# Patient Record
Sex: FEMALE | Race: ASIAN | NOT HISPANIC OR LATINO | URBAN - METROPOLITAN AREA
[De-identification: names, ages, dates, MRNs, and addresses within clinical notes are randomized per-mention and may not be internally consistent; named-entity substitution may affect disease eponyms.]

---

## 2017-03-13 ENCOUNTER — EMERGENCY (EMERGENCY)
Facility: HOSPITAL | Age: 57
LOS: 1 days | Discharge: ANOTHER HOSPITAL | End: 2017-03-13
Attending: EMERGENCY MEDICINE
Payer: COMMERCIAL

## 2017-03-13 ENCOUNTER — INPATIENT (INPATIENT)
Facility: HOSPITAL | Age: 57
LOS: 8 days | Discharge: HOME | DRG: 25 | End: 2017-03-22
Attending: STUDENT IN AN ORGANIZED HEALTH CARE EDUCATION/TRAINING PROGRAM | Admitting: INTERNAL MEDICINE
Payer: COMMERCIAL

## 2017-03-13 VITALS
WEIGHT: 132.94 LBS | HEIGHT: 61 IN | RESPIRATION RATE: 16 BRPM | SYSTOLIC BLOOD PRESSURE: 109 MMHG | DIASTOLIC BLOOD PRESSURE: 70 MMHG | HEART RATE: 83 BPM | TEMPERATURE: 98 F | OXYGEN SATURATION: 100 %

## 2017-03-13 VITALS
HEART RATE: 80 BPM | RESPIRATION RATE: 18 BRPM | TEMPERATURE: 99 F | OXYGEN SATURATION: 100 % | SYSTOLIC BLOOD PRESSURE: 120 MMHG | DIASTOLIC BLOOD PRESSURE: 80 MMHG

## 2017-03-13 DIAGNOSIS — C79.51 SECONDARY MALIGNANT NEOPLASM OF BONE: ICD-10-CM

## 2017-03-13 DIAGNOSIS — Z90.13 ACQUIRED ABSENCE OF BILATERAL BREASTS AND NIPPLES: Chronic | ICD-10-CM

## 2017-03-13 DIAGNOSIS — C78.00 SECONDARY MALIGNANT NEOPLASM OF UNSPECIFIED LUNG: ICD-10-CM

## 2017-03-13 DIAGNOSIS — C79.31 SECONDARY MALIGNANT NEOPLASM OF BRAIN: ICD-10-CM

## 2017-03-13 DIAGNOSIS — C50.919 MALIGNANT NEOPLASM OF UNSPECIFIED SITE OF UNSPECIFIED FEMALE BREAST: ICD-10-CM

## 2017-03-13 LAB
ALBUMIN SERPL ELPH-MCNC: 4.6 G/DL — SIGNIFICANT CHANGE UP (ref 3.5–5)
ALP SERPL-CCNC: 66 U/L — SIGNIFICANT CHANGE UP (ref 40–120)
ALT FLD-CCNC: 13 U/L DA — SIGNIFICANT CHANGE UP (ref 10–60)
ANION GAP SERPL CALC-SCNC: 8 MMOL/L — SIGNIFICANT CHANGE UP (ref 5–17)
APTT BLD: 29.2 SEC — SIGNIFICANT CHANGE UP (ref 27.5–37.4)
AST SERPL-CCNC: 14 U/L — SIGNIFICANT CHANGE UP (ref 10–40)
BASOPHILS # BLD AUTO: 0.1 K/UL — SIGNIFICANT CHANGE UP (ref 0–0.2)
BASOPHILS NFR BLD AUTO: 0.9 % — SIGNIFICANT CHANGE UP (ref 0–2)
BILIRUB SERPL-MCNC: 0.6 MG/DL — SIGNIFICANT CHANGE UP (ref 0.2–1.2)
BUN SERPL-MCNC: 8 MG/DL — SIGNIFICANT CHANGE UP (ref 7–18)
CALCIUM SERPL-MCNC: 9.6 MG/DL — SIGNIFICANT CHANGE UP (ref 8.4–10.5)
CHLORIDE SERPL-SCNC: 105 MMOL/L — SIGNIFICANT CHANGE UP (ref 96–108)
CO2 SERPL-SCNC: 28 MMOL/L — SIGNIFICANT CHANGE UP (ref 22–31)
CREAT SERPL-MCNC: 0.68 MG/DL — SIGNIFICANT CHANGE UP (ref 0.5–1.3)
EOSINOPHIL # BLD AUTO: 0.1 K/UL — SIGNIFICANT CHANGE UP (ref 0–0.5)
EOSINOPHIL NFR BLD AUTO: 0.9 % — SIGNIFICANT CHANGE UP (ref 0–6)
GLUCOSE SERPL-MCNC: 91 MG/DL — SIGNIFICANT CHANGE UP (ref 70–99)
HCT VFR BLD CALC: 43.9 % — SIGNIFICANT CHANGE UP (ref 34.5–45)
HGB BLD-MCNC: 14.3 G/DL — SIGNIFICANT CHANGE UP (ref 11.5–15.5)
INR BLD: 0.97 RATIO — SIGNIFICANT CHANGE UP (ref 0.88–1.16)
LYMPHOCYTES # BLD AUTO: 1.8 K/UL — SIGNIFICANT CHANGE UP (ref 1–3.3)
LYMPHOCYTES # BLD AUTO: 29.4 % — SIGNIFICANT CHANGE UP (ref 13–44)
MCHC RBC-ENTMCNC: 32.7 GM/DL — SIGNIFICANT CHANGE UP (ref 32–36)
MCHC RBC-ENTMCNC: 33.6 PG — SIGNIFICANT CHANGE UP (ref 27–34)
MCV RBC AUTO: 102.7 FL — HIGH (ref 80–100)
MONOCYTES # BLD AUTO: 0.5 K/UL — SIGNIFICANT CHANGE UP (ref 0–0.9)
MONOCYTES NFR BLD AUTO: 8.1 % — SIGNIFICANT CHANGE UP (ref 2–14)
NEUTROPHILS # BLD AUTO: 3.7 K/UL — SIGNIFICANT CHANGE UP (ref 1.8–7.4)
NEUTROPHILS NFR BLD AUTO: 60.7 % — SIGNIFICANT CHANGE UP (ref 43–77)
PLATELET # BLD AUTO: 223 K/UL — SIGNIFICANT CHANGE UP (ref 150–400)
POTASSIUM SERPL-MCNC: 4.2 MMOL/L — SIGNIFICANT CHANGE UP (ref 3.5–5.3)
POTASSIUM SERPL-SCNC: 4.2 MMOL/L — SIGNIFICANT CHANGE UP (ref 3.5–5.3)
PROT SERPL-MCNC: 8.5 G/DL — HIGH (ref 6–8.3)
PROTHROM AB SERPL-ACNC: 10.9 SEC — SIGNIFICANT CHANGE UP (ref 10–13.1)
RBC # BLD: 4.27 M/UL — SIGNIFICANT CHANGE UP (ref 3.8–5.2)
RBC # FLD: 15.6 % — HIGH (ref 10.3–14.5)
SODIUM SERPL-SCNC: 141 MMOL/L — SIGNIFICANT CHANGE UP (ref 135–145)
WBC # BLD: 6 K/UL — SIGNIFICANT CHANGE UP (ref 3.8–10.5)
WBC # FLD AUTO: 6 K/UL — SIGNIFICANT CHANGE UP (ref 3.8–10.5)

## 2017-03-13 PROCEDURE — 85610 PROTHROMBIN TIME: CPT

## 2017-03-13 PROCEDURE — 80053 COMPREHEN METABOLIC PANEL: CPT

## 2017-03-13 PROCEDURE — 85730 THROMBOPLASTIN TIME PARTIAL: CPT

## 2017-03-13 PROCEDURE — 85027 COMPLETE CBC AUTOMATED: CPT

## 2017-03-13 PROCEDURE — 86901 BLOOD TYPING SEROLOGIC RH(D): CPT

## 2017-03-13 PROCEDURE — 99285 EMERGENCY DEPT VISIT HI MDM: CPT

## 2017-03-13 PROCEDURE — 99285 EMERGENCY DEPT VISIT HI MDM: CPT | Mod: 25

## 2017-03-13 PROCEDURE — 86850 RBC ANTIBODY SCREEN: CPT

## 2017-03-13 PROCEDURE — 70450 CT HEAD/BRAIN W/O DYE: CPT | Mod: 26

## 2017-03-13 PROCEDURE — 99283 EMERGENCY DEPT VISIT LOW MDM: CPT

## 2017-03-13 PROCEDURE — 96375 TX/PRO/DX INJ NEW DRUG ADDON: CPT

## 2017-03-13 PROCEDURE — 96374 THER/PROPH/DIAG INJ IV PUSH: CPT

## 2017-03-13 PROCEDURE — 86900 BLOOD TYPING SEROLOGIC ABO: CPT

## 2017-03-13 RX ORDER — DEXAMETHASONE 0.5 MG/5ML
10 ELIXIR ORAL ONCE
Qty: 0 | Refills: 0 | Status: COMPLETED | OUTPATIENT
Start: 2017-03-13 | End: 2017-03-13

## 2017-03-13 RX ORDER — LEVETIRACETAM 250 MG/1
1000 TABLET, FILM COATED ORAL ONCE
Qty: 0 | Refills: 0 | Status: COMPLETED | OUTPATIENT
Start: 2017-03-13 | End: 2017-03-13

## 2017-03-13 RX ADMIN — Medication 10 MILLIGRAM(S): at 19:55

## 2017-03-13 RX ADMIN — LEVETIRACETAM 400 MILLIGRAM(S): 250 TABLET, FILM COATED ORAL at 20:13

## 2017-03-13 NOTE — ED PROVIDER NOTE - OBJECTIVE STATEMENT
PMD Carson ([facility])  56F hx of metastatic breast cancer on chemo last chemo ziloda yesterday (7days on 7 days off) transfer from [place] for masses in brain. Pt has been having headaches for the past few weeks. had MRI today outpatient went to [place] sent over for neurosurgery eval. 3 enhancing intraaxial lesions with marked surrouinding edema resulting in mild subfalcine herniation. Pt was given decadron and keppra. No headache at this point.

## 2017-03-13 NOTE — ED ADULT NURSE NOTE - OBJECTIVE STATEMENT
57 y/o F, transfer from [facility] for neuro eval and new dx of metastatic brain CA. Pt was dx with breast CA in 2015, double mastectomy in 2016, on oral chemo Ziloda 7 days on/7 days off, yesterday was 7th day on. Pt had been having intermittent headaches x 1 week, got outpatient MRI and was told to come back to hospital for neuro w/u. Pt got 1000 mg Keppra and 10 mg decadron at [facility] at 1930. Pt presents AAOx4, NAD, ambulates with cane, neurologically intact, moving all extremities. No confusion or changes in mental status. Pt currently denies headaches, visual changes, numbness/tingling, dizziness, chest pain, SOB, abdominal pain, n/v/d, urinary symptoms, fevers, chills, weakness at this time. Family at bedside, safety maintained.

## 2017-03-13 NOTE — ED PROVIDER NOTE - MEDICAL DECISION MAKING DETAILS
56F hx metastatic breast ca presents with headache and mets to brain. Will obtain neurosurgery consult and admission

## 2017-03-13 NOTE — ED PROVIDER NOTE - PHYSICAL EXAMINATION
Constitutional: mild distress  Eyes: PERRLA EOMI  Head: Normocephalic atraumatic  Cardiac: regular rate   Resp: Lungs CTAB  GI: Abd s/nt/nd  Neuro: CN2-12 intact  Skin: No rashes

## 2017-03-13 NOTE — ED ADULT NURSE NOTE - CHPI ED SYMPTOMS NEG
no change in level of consciousness/no nausea/no dizziness/no vomiting/no loss of consciousness/no confusion/no numbness/no weakness/no blurred vision/no fever

## 2017-03-13 NOTE — ED ADULT NURSE NOTE - CHPI ED SYMPTOMS NEG
no dizziness/no nausea/no fever/no chills/no decreased eating/drinking/no numbness/no vomiting/no pain/no tingling

## 2017-03-13 NOTE — ED PROVIDER NOTE - PROGRESS NOTE DETAILS
In discussion with Dr. Morelos, of neurosurgery, patient should be transferred to [facility] ED for neurosurgical evaluation. Recommends decadron and keppra. Patient aware. Dr. Pena, ED attending, aware of transfer. Message left for patient's PMD,. Dr. Byrd.

## 2017-03-13 NOTE — ED PROVIDER NOTE - ATTENDING CONTRIBUTION TO CARE
56F hx of metastatic breast cancer on chemo last chemo ziloda yesterday (7days on 7 days off) transfer from [place] for masses in brain. Pt has been having headaches for the past few weeks. had MRI today outpatient went to [place] sent over for neurosurgery eval. 3 enhancing intraaxial lesions with marked surrounding edema resulting in mild subfalcine herniation. Pt was given decadron and keppra. No headache at this point. Moderate symptoms at first.  mild distress secondary to pain, ncat, perrl, CN 2-12 intact, normal coordination, non-tachycardic, cooperative, with capacity and insight, no rash, non-distended, no edema  will get iv, labs, neurosurgery consult and reassess. Will admit.

## 2017-03-13 NOTE — ED PROVIDER NOTE - NS ED ROS FT
Constitutional: No fever or chills  Eyes: No visual changes  HEENT: No throat pain  CV: No chest pain  Resp: No SOB + cough  GI: No abd pain, nausea + vomiting  : No dysuria  MSK: No musculoskeletal pain  Skin: No rash  Neuro: + headache

## 2017-03-13 NOTE — ED ADULT NURSE NOTE - ASSOCIATED SYMPTOMS
pt is A&Ox3, ambulatory, able to make needs known sent to ED by her PMD for possible CA spread after MRI results of head. PT denies any pain of headache

## 2017-03-14 ENCOUNTER — RESULT REVIEW (OUTPATIENT)
Age: 57
End: 2017-03-14

## 2017-03-14 DIAGNOSIS — Z71.89 OTHER SPECIFIED COUNSELING: ICD-10-CM

## 2017-03-14 DIAGNOSIS — C79.31 SECONDARY MALIGNANT NEOPLASM OF BRAIN: ICD-10-CM

## 2017-03-14 DIAGNOSIS — C50.919 MALIGNANT NEOPLASM OF UNSPECIFIED SITE OF UNSPECIFIED FEMALE BREAST: ICD-10-CM

## 2017-03-14 DIAGNOSIS — I82.90 ACUTE EMBOLISM AND THROMBOSIS OF UNSPECIFIED VEIN: ICD-10-CM

## 2017-03-14 DIAGNOSIS — Z90.13 ACQUIRED ABSENCE OF BILATERAL BREASTS AND NIPPLES: Chronic | ICD-10-CM

## 2017-03-14 LAB
ALBUMIN SERPL ELPH-MCNC: 4.7 G/DL — SIGNIFICANT CHANGE UP (ref 3.3–5)
ALP SERPL-CCNC: 61 U/L — SIGNIFICANT CHANGE UP (ref 40–120)
ALT FLD-CCNC: 5 U/L RC — LOW (ref 10–45)
ANION GAP SERPL CALC-SCNC: 16 MMOL/L — SIGNIFICANT CHANGE UP (ref 5–17)
ANION GAP SERPL CALC-SCNC: 18 MMOL/L — HIGH (ref 5–17)
APTT BLD: 28.2 SEC — SIGNIFICANT CHANGE UP (ref 27.5–37.4)
APTT BLD: 30.3 SEC — SIGNIFICANT CHANGE UP (ref 27.5–37.4)
AST SERPL-CCNC: 11 U/L — SIGNIFICANT CHANGE UP (ref 10–40)
BASOPHILS # BLD AUTO: 0 K/UL — SIGNIFICANT CHANGE UP (ref 0–0.2)
BASOPHILS NFR BLD AUTO: 0.2 % — SIGNIFICANT CHANGE UP (ref 0–2)
BILIRUB DIRECT SERPL-MCNC: 0.1 MG/DL — SIGNIFICANT CHANGE UP (ref 0–0.2)
BILIRUB INDIRECT FLD-MCNC: 0.3 MG/DL — SIGNIFICANT CHANGE UP (ref 0.2–1)
BILIRUB SERPL-MCNC: 0.4 MG/DL — SIGNIFICANT CHANGE UP (ref 0.2–1.2)
BLD GP AB SCN SERPL QL: NEGATIVE — SIGNIFICANT CHANGE UP
BUN SERPL-MCNC: 12 MG/DL — SIGNIFICANT CHANGE UP (ref 7–23)
BUN SERPL-MCNC: 13 MG/DL — SIGNIFICANT CHANGE UP (ref 7–23)
CALCIUM SERPL-MCNC: 10.2 MG/DL — SIGNIFICANT CHANGE UP (ref 8.4–10.5)
CALCIUM SERPL-MCNC: 9.9 MG/DL — SIGNIFICANT CHANGE UP (ref 8.4–10.5)
CHLORIDE SERPL-SCNC: 100 MMOL/L — SIGNIFICANT CHANGE UP (ref 96–108)
CHLORIDE SERPL-SCNC: 102 MMOL/L — SIGNIFICANT CHANGE UP (ref 96–108)
CO2 SERPL-SCNC: 23 MMOL/L — SIGNIFICANT CHANGE UP (ref 22–31)
CO2 SERPL-SCNC: 23 MMOL/L — SIGNIFICANT CHANGE UP (ref 22–31)
CREAT SERPL-MCNC: 0.75 MG/DL — SIGNIFICANT CHANGE UP (ref 0.5–1.3)
CREAT SERPL-MCNC: 0.76 MG/DL — SIGNIFICANT CHANGE UP (ref 0.5–1.3)
EOSINOPHIL # BLD AUTO: 0 K/UL — SIGNIFICANT CHANGE UP (ref 0–0.5)
EOSINOPHIL NFR BLD AUTO: 0.2 % — SIGNIFICANT CHANGE UP (ref 0–6)
GLUCOSE SERPL-MCNC: 222 MG/DL — HIGH (ref 70–99)
GLUCOSE SERPL-MCNC: 228 MG/DL — HIGH (ref 70–99)
HCG SERPL-ACNC: <2 MIU/ML — SIGNIFICANT CHANGE UP
HCG SERPL-ACNC: <2 MIU/ML — SIGNIFICANT CHANGE UP
HCT VFR BLD CALC: 37.3 % — SIGNIFICANT CHANGE UP (ref 34.5–45)
HCT VFR BLD CALC: 39.3 % — SIGNIFICANT CHANGE UP (ref 34.5–45)
HGB BLD-MCNC: 12.3 G/DL — SIGNIFICANT CHANGE UP (ref 11.5–15.5)
HGB BLD-MCNC: 13.1 G/DL — SIGNIFICANT CHANGE UP (ref 11.5–15.5)
INR BLD: 1.07 RATIO — SIGNIFICANT CHANGE UP (ref 0.88–1.16)
INR BLD: 1.1 RATIO — SIGNIFICANT CHANGE UP (ref 0.88–1.16)
LYMPHOCYTES # BLD AUTO: 0.7 K/UL — LOW (ref 1–3.3)
LYMPHOCYTES # BLD AUTO: 11.8 % — LOW (ref 13–44)
MCHC RBC-ENTMCNC: 32.7 PG — SIGNIFICANT CHANGE UP (ref 27–34)
MCHC RBC-ENTMCNC: 32.9 GM/DL — SIGNIFICANT CHANGE UP (ref 32–36)
MCHC RBC-ENTMCNC: 33 PG — SIGNIFICANT CHANGE UP (ref 27–34)
MCHC RBC-ENTMCNC: 33.4 GM/DL — SIGNIFICANT CHANGE UP (ref 32–36)
MCV RBC AUTO: 98.9 FL — SIGNIFICANT CHANGE UP (ref 80–100)
MCV RBC AUTO: 99.4 FL — SIGNIFICANT CHANGE UP (ref 80–100)
MONOCYTES # BLD AUTO: 0.1 K/UL — SIGNIFICANT CHANGE UP (ref 0–0.9)
MONOCYTES NFR BLD AUTO: 0.9 % — LOW (ref 2–14)
NEUTROPHILS # BLD AUTO: 5.4 K/UL — SIGNIFICANT CHANGE UP (ref 1.8–7.4)
NEUTROPHILS NFR BLD AUTO: 86.9 % — HIGH (ref 43–77)
PLATELET # BLD AUTO: 193 K/UL — SIGNIFICANT CHANGE UP (ref 150–400)
PLATELET # BLD AUTO: 216 K/UL — SIGNIFICANT CHANGE UP (ref 150–400)
POTASSIUM SERPL-MCNC: 4.3 MMOL/L — SIGNIFICANT CHANGE UP (ref 3.5–5.3)
POTASSIUM SERPL-MCNC: 4.6 MMOL/L — SIGNIFICANT CHANGE UP (ref 3.5–5.3)
POTASSIUM SERPL-SCNC: 4.3 MMOL/L — SIGNIFICANT CHANGE UP (ref 3.5–5.3)
POTASSIUM SERPL-SCNC: 4.6 MMOL/L — SIGNIFICANT CHANGE UP (ref 3.5–5.3)
PROT SERPL-MCNC: 7.7 G/DL — SIGNIFICANT CHANGE UP (ref 6–8.3)
PROTHROM AB SERPL-ACNC: 11.7 SEC — SIGNIFICANT CHANGE UP (ref 10–13.1)
PROTHROM AB SERPL-ACNC: 11.9 SEC — SIGNIFICANT CHANGE UP (ref 10–13.1)
RBC # BLD: 3.75 M/UL — LOW (ref 3.8–5.2)
RBC # BLD: 3.97 M/UL — SIGNIFICANT CHANGE UP (ref 3.8–5.2)
RBC # FLD: 14.9 % — HIGH (ref 10.3–14.5)
RBC # FLD: 15.2 % — HIGH (ref 10.3–14.5)
RH IG SCN BLD-IMP: POSITIVE — SIGNIFICANT CHANGE UP
RH IG SCN BLD-IMP: POSITIVE — SIGNIFICANT CHANGE UP
SODIUM SERPL-SCNC: 139 MMOL/L — SIGNIFICANT CHANGE UP (ref 135–145)
SODIUM SERPL-SCNC: 143 MMOL/L — SIGNIFICANT CHANGE UP (ref 135–145)
WBC # BLD: 6.2 K/UL — SIGNIFICANT CHANGE UP (ref 3.8–10.5)
WBC # BLD: 7.6 K/UL — SIGNIFICANT CHANGE UP (ref 3.8–10.5)
WBC # FLD AUTO: 6.2 K/UL — SIGNIFICANT CHANGE UP (ref 3.8–10.5)
WBC # FLD AUTO: 7.6 K/UL — SIGNIFICANT CHANGE UP (ref 3.8–10.5)

## 2017-03-14 PROCEDURE — 93010 ELECTROCARDIOGRAM REPORT: CPT

## 2017-03-14 PROCEDURE — 99223 1ST HOSP IP/OBS HIGH 75: CPT | Mod: AI

## 2017-03-14 PROCEDURE — 99497 ADVNCD CARE PLAN 30 MIN: CPT | Mod: 25

## 2017-03-14 PROCEDURE — 70552 MRI BRAIN STEM W/DYE: CPT | Mod: 26

## 2017-03-14 PROCEDURE — 99222 1ST HOSP IP/OBS MODERATE 55: CPT

## 2017-03-14 PROCEDURE — 70450 CT HEAD/BRAIN W/O DYE: CPT | Mod: 26

## 2017-03-14 PROCEDURE — 71010: CPT | Mod: 26

## 2017-03-14 RX ORDER — INSULIN LISPRO 100/ML
VIAL (ML) SUBCUTANEOUS AT BEDTIME
Qty: 0 | Refills: 0 | Status: DISCONTINUED | OUTPATIENT
Start: 2017-03-14 | End: 2017-03-15

## 2017-03-14 RX ORDER — DEXTROSE 50 % IN WATER 50 %
1 SYRINGE (ML) INTRAVENOUS ONCE
Qty: 0 | Refills: 0 | Status: DISCONTINUED | OUTPATIENT
Start: 2017-03-14 | End: 2017-03-15

## 2017-03-14 RX ORDER — ACETAMINOPHEN 500 MG
650 TABLET ORAL EVERY 6 HOURS
Qty: 0 | Refills: 0 | Status: DISCONTINUED | OUTPATIENT
Start: 2017-03-14 | End: 2017-03-15

## 2017-03-14 RX ORDER — DEXAMETHASONE 0.5 MG/5ML
4 ELIXIR ORAL EVERY 6 HOURS
Qty: 0 | Refills: 0 | Status: DISCONTINUED | OUTPATIENT
Start: 2017-03-14 | End: 2017-03-14

## 2017-03-14 RX ORDER — DEXTROSE 50 % IN WATER 50 %
25 SYRINGE (ML) INTRAVENOUS ONCE
Qty: 0 | Refills: 0 | Status: DISCONTINUED | OUTPATIENT
Start: 2017-03-14 | End: 2017-03-15

## 2017-03-14 RX ORDER — HYDROMORPHONE HYDROCHLORIDE 2 MG/ML
0.5 INJECTION INTRAMUSCULAR; INTRAVENOUS; SUBCUTANEOUS EVERY 4 HOURS
Qty: 0 | Refills: 0 | Status: DISCONTINUED | OUTPATIENT
Start: 2017-03-14 | End: 2017-03-15

## 2017-03-14 RX ORDER — PANTOPRAZOLE SODIUM 20 MG/1
40 TABLET, DELAYED RELEASE ORAL
Qty: 0 | Refills: 0 | Status: DISCONTINUED | OUTPATIENT
Start: 2017-03-14 | End: 2017-03-15

## 2017-03-14 RX ORDER — SENNA PLUS 8.6 MG/1
2 TABLET ORAL AT BEDTIME
Qty: 0 | Refills: 0 | Status: DISCONTINUED | OUTPATIENT
Start: 2017-03-14 | End: 2017-03-15

## 2017-03-14 RX ORDER — ONDANSETRON 8 MG/1
4 TABLET, FILM COATED ORAL EVERY 6 HOURS
Qty: 0 | Refills: 0 | Status: DISCONTINUED | OUTPATIENT
Start: 2017-03-14 | End: 2017-03-15

## 2017-03-14 RX ORDER — OXYCODONE HYDROCHLORIDE 5 MG/1
5 TABLET ORAL EVERY 6 HOURS
Qty: 0 | Refills: 0 | Status: DISCONTINUED | OUTPATIENT
Start: 2017-03-14 | End: 2017-03-14

## 2017-03-14 RX ORDER — ENOXAPARIN SODIUM 100 MG/ML
40 INJECTION SUBCUTANEOUS EVERY 24 HOURS
Qty: 0 | Refills: 0 | Status: DISCONTINUED | OUTPATIENT
Start: 2017-03-14 | End: 2017-03-14

## 2017-03-14 RX ORDER — INSULIN LISPRO 100/ML
VIAL (ML) SUBCUTANEOUS
Qty: 0 | Refills: 0 | Status: DISCONTINUED | OUTPATIENT
Start: 2017-03-14 | End: 2017-03-15

## 2017-03-14 RX ORDER — DEXAMETHASONE 0.5 MG/5ML
4 ELIXIR ORAL EVERY 6 HOURS
Qty: 0 | Refills: 0 | Status: DISCONTINUED | OUTPATIENT
Start: 2017-03-14 | End: 2017-03-15

## 2017-03-14 RX ORDER — SODIUM CHLORIDE 9 MG/ML
1000 INJECTION, SOLUTION INTRAVENOUS
Qty: 0 | Refills: 0 | Status: DISCONTINUED | OUTPATIENT
Start: 2017-03-14 | End: 2017-03-15

## 2017-03-14 RX ORDER — LEVETIRACETAM 250 MG/1
500 TABLET, FILM COATED ORAL
Qty: 0 | Refills: 0 | Status: DISCONTINUED | OUTPATIENT
Start: 2017-03-14 | End: 2017-03-15

## 2017-03-14 RX ORDER — DOCUSATE SODIUM 100 MG
100 CAPSULE ORAL THREE TIMES A DAY
Qty: 0 | Refills: 0 | Status: DISCONTINUED | OUTPATIENT
Start: 2017-03-14 | End: 2017-03-15

## 2017-03-14 RX ORDER — DEXTROSE 50 % IN WATER 50 %
12.5 SYRINGE (ML) INTRAVENOUS ONCE
Qty: 0 | Refills: 0 | Status: DISCONTINUED | OUTPATIENT
Start: 2017-03-14 | End: 2017-03-15

## 2017-03-14 RX ORDER — GLUCAGON INJECTION, SOLUTION 0.5 MG/.1ML
1 INJECTION, SOLUTION SUBCUTANEOUS ONCE
Qty: 0 | Refills: 0 | Status: DISCONTINUED | OUTPATIENT
Start: 2017-03-14 | End: 2017-03-15

## 2017-03-14 RX ORDER — GABAPENTIN 400 MG/1
100 CAPSULE ORAL THREE TIMES A DAY
Qty: 0 | Refills: 0 | Status: DISCONTINUED | OUTPATIENT
Start: 2017-03-14 | End: 2017-03-15

## 2017-03-14 RX ORDER — SODIUM CHLORIDE 9 MG/ML
1000 INJECTION INTRAMUSCULAR; INTRAVENOUS; SUBCUTANEOUS
Qty: 0 | Refills: 0 | Status: DISCONTINUED | OUTPATIENT
Start: 2017-03-14 | End: 2017-03-15

## 2017-03-14 RX ADMIN — Medication 100 MILLIGRAM(S): at 21:30

## 2017-03-14 RX ADMIN — SENNA PLUS 2 TABLET(S): 8.6 TABLET ORAL at 21:30

## 2017-03-14 RX ADMIN — Medication 4 MILLIGRAM(S): at 21:30

## 2017-03-14 RX ADMIN — SODIUM CHLORIDE 75 MILLILITER(S): 9 INJECTION INTRAMUSCULAR; INTRAVENOUS; SUBCUTANEOUS at 21:31

## 2017-03-14 RX ADMIN — Medication 4 MILLIGRAM(S): at 04:13

## 2017-03-14 RX ADMIN — LEVETIRACETAM 500 MILLIGRAM(S): 250 TABLET, FILM COATED ORAL at 17:39

## 2017-03-14 RX ADMIN — Medication 4 MILLIGRAM(S): at 13:09

## 2017-03-14 RX ADMIN — LEVETIRACETAM 500 MILLIGRAM(S): 250 TABLET, FILM COATED ORAL at 06:13

## 2017-03-14 RX ADMIN — Medication 4 MILLIGRAM(S): at 17:39

## 2017-03-14 RX ADMIN — ENOXAPARIN SODIUM 40 MILLIGRAM(S): 100 INJECTION SUBCUTANEOUS at 13:09

## 2017-03-14 RX ADMIN — Medication 100 MILLIGRAM(S): at 13:09

## 2017-03-14 NOTE — H&P ADULT. - HISTORY OF PRESENT ILLNESS
56F hx metastatic breast ca Dx 3/2014 s/p cxt/xrt s/p bl mastectomy on xeloda (last 3/12/17) p/w headaches.    ED VS: Tmax: 99, BP: 113-120/75-80, P: 73-92, R: 16-18, O2: % on RA  ED meds: no meds given 56F hx metastatic breast ca w/ mets to bone, lung, lymph nodes dx 3/2014 s/p cxt/xrt s/p bl mastectomy w/ LN dissection, chronic paresthesias and L brachial neuritis, on xeloda (last 3/12/17) p/w headaches x 4d. notes on sunday she began to have achy, diffuse headaches, 7/10 in intensity, associated with mild blurry vision, mildly relieved with tylenol and for the most part relieved with nsaids. she has chronic paresthesias of the feet from chemo however denies worsening paresthesias, weakness/numbness of her extremities. she went to see her oncologist who recommended an MRI brain which showed metastatic disease to brain and she was recommended to come to the emergency room. she went to [facility] and was transferred to [facility] for further management and neurosurgery evaluation. denies fevers, chills, cp, sob, d/n/v, dysuria, hematuria. notes she has been having pet/ct scans every 8 weeks and was recently noted to have a small spot in her lungs which increased in size minimally on a follow up scan.    ED VS: Tmax: 99, BP: 113-120/75-80, P: 73-92, R: 16-18, O2: % on RA  ED meds: no meds given 56F hx metastatic breast ca w/ mets to bone, lung, lymph nodes dx 3/2014 s/p cxt/xrt s/p bl mastectomy w/ LN dissection, chronic paresthesias and L brachial neuritis, on xeloda (last 3/12/17) p/w headaches x 4d. history obtained from patient and family at bedside. notes on sunday she began to have achy, diffuse headaches, 7/10 in intensity, associated with mild blurry vision, mildly relieved with tylenol and for the most part relieved with nsaids. she has chronic paresthesias of the feet from chemo however denies worsening paresthesias, weakness/numbness of her extremities. she went to see her oncologist who recommended an MRI brain which showed metastatic disease to brain and she was recommended to come to the emergency room. she went to [facility] and was transferred to [facility] for further management and neurosurgery evaluation. denies fevers, chills, cp, sob, d/n/v, dysuria, hematuria. notes she has been having pet/ct scans every 8 weeks and was recently noted to have a small spot in her lungs which increased in size minimally on a follow up scan.    ED VS: Tmax: 99, BP: 113-120/75-80, P: 73-92, R: 16-18, O2: % on RA  ED meds: no meds given [employer] hospitalist, patient not previously known to me    56F hx metastatic breast ca w/ mets to bone, lung, lymph nodes dx 3/2014 s/p cxt/xrt s/p bl mastectomy w/ LN dissection, chronic paresthesias and L brachial neuritis, on xeloda (last 3/12/17) p/w headaches x 4d. history obtained from patient and family at bedside. notes on sunday she began to have achy, diffuse headaches, 7/10 in intensity, associated with mild blurry vision, mildly relieved with tylenol and for the most part relieved with nsaids. she has chronic paresthesias of the feet from chemo however denies worsening paresthesias, weakness/numbness of her extremities. she went to see her oncologist who recommended an MRI brain which showed metastatic disease to brain and she was recommended to come to the emergency room. she went to [facility] and was transferred to [facility] for further management and neurosurgery evaluation. denies fevers, chills, cp, sob, d/n/v, dysuria, hematuria. notes she has been having pet/ct scans every 8 weeks and was recently noted to have a small spot in her lungs which increased in size minimally on a follow up scan.    ED VS: Tmax: 99, BP: 113-120/75-80, P: 73-92, R: 16-18, O2: % on RA  ED meds: no meds given

## 2017-03-14 NOTE — H&P ADULT. - RADIOLOGY RESULTS AND INTERPRETATION
CT head personally reviewed: Vasogenic  edema  within  the  right  parieto-occipital  lobes  causes  2  mm leftward  midline  shift  and  regional  mass  effect.  Abnormal  soft  tissue density  in  the  medial  right  parietal  lobe  may  be  related  to  this  or  a separate  lesion.  Additional  vasogenic  edema  in  the  high  medial  left  frontal lobe  likely  reflects  additional  lesion. Findings  are  concerning  for  metastatic  disease.  Contrast-enhanced  MRI  brain is  recommended  for  further  evaluation  if  there  are  no  contraindications  to MR.  No  dea  herniation.  Mild  dilatation  of  the  right  temporal  horn  due  to mass  effect  on  the  atrium  of  the  right  lateral  ventricle.  MRI brain ordered. CT head personally reviewed: Vasogenic  edema  within  the  right  parieto-occipital  lobes  causes  2  mm leftward  midline  shift  and  regional  mass  effect.  Abnormal  soft  tissue density  in  the  medial  right  parietal  lobe  may  be  related  to  this  or  a separate  lesion.  Additional  vasogenic  edema  in  the  high  medial  left  frontal lobe  likely  reflects  additional  lesion. Findings  are  concerning  for  metastatic  disease.  Contrast-enhanced  MRI  brain is  recommended  for  further  evaluation  if  there  are  no  contraindications  to MR.  No  dea  herniation.  Mild  dilatation  of  the  right  temporal  horn  due  to mass  effect  on  the  atrium  of  the  right  lateral  ventricle.  MRI brain records reviewed: metastatic disease to brain. 3 enhancing intra-axial lesions. largest, R occipital w/ surrounding edema  MRI brain stereotactic ordered.

## 2017-03-14 NOTE — H&P ADULT. - RS GEN PE MLT RESP DETAILS PC
good air movement/clear to auscultation bilaterally/airway patent/respirations non-labored/breath sounds equal

## 2017-03-14 NOTE — H&P ADULT. - PROBLEM SELECTOR PLAN 4
I personally provided 25 minutes of advance care planning. discussed goals of care, dnr/dni with patient and family at bedside. after extensive discussion, patient would like to be full code. will continue discussion with family. full code.

## 2017-03-14 NOTE — ED ADULT NURSE REASSESSMENT NOTE - NS ED NURSE REASSESS COMMENT FT1
Pt AAOx4, NAD, resting comfortably in bed with family at bedside, pt neurologically intact, moving all extremities, no confusion or change in mental status noted. Pt denies visual changes, numbness/tingling, headache, dizziness, chest pain, SOB, abdominal pain, n/v/d, fevers, chills, weakness at this time. Report called to Laith Hemphill, pt awaiting transport, drinking tea and having crackers and apple sauce, safety maintained.

## 2017-03-14 NOTE — H&P ADULT. - ASSESSMENT
56F hx metastatic breast ca on xeloda (last 3/12/17) p/w headaches. 56F hx metastatic breast ca w/ mets to bone, lung, lymph nodes dx 3/2014 s/p cxt/xrt s/p bl mastectomy w/ LN dissection, chronic paresthesias and L brachial neuritis, on xeloda (last 3/12/17) p/w headaches x 4d.

## 2017-03-14 NOTE — ED ADULT NURSE REASSESSMENT NOTE - GENERAL PATIENT STATE
improvement verbalized/comfortable appearance/family/SO at bedside/smiling/interactive/resting/sleeping/cooperative

## 2017-03-14 NOTE — H&P ADULT. - PROBLEM SELECTOR PLAN 2
On xeloda  -heme/onc consult On xeloda. will require further discussion re: surgery, change in treatment, radiation.  -heme/onc consult

## 2017-03-14 NOTE — H&P ADULT. - PROBLEM SELECTOR PLAN 1
CT head c/f intracranial metastatic disease w/ vasogenic edema and mass effect.  -decadron 4mg po q6h  -keppra 500mg po bid  -f/u MRI brain MRI/CT head c/f intracranial metastatic disease w/ vasogenic edema and mass effect. neurosurgery evaluated in ed. neurologically intact. s/p decadron/keppra at OSH (doses at 730pm per records)  -decadron 4mg po q6h  -keppra 500mg po bid  -f/u MRI brain sterotactic  -f/u neurosurgery recs  -radiation onc consult  -oncology consult (dr felipe king) MRI/CT head c/f intracranial metastatic disease w/ vasogenic edema and mass effect. neurosurgery evaluated in ed. neurologically intact. s/p decadron/keppra at OSH (doses at 730pm per records)  -decadron 4mg po q6h  -keppra 500mg po bid  -f/u MRI brain sterotactic  -f/u neurosurgery recs  -radiation onc consult  -oncology consult (dr felipe king)  -pain control w/ dilaudid

## 2017-03-15 ENCOUNTER — APPOINTMENT (OUTPATIENT)
Dept: NEUROSURGERY | Facility: HOSPITAL | Age: 57
End: 2017-03-15

## 2017-03-15 LAB
ANION GAP SERPL CALC-SCNC: 17 MMOL/L — SIGNIFICANT CHANGE UP (ref 5–17)
BUN SERPL-MCNC: 9 MG/DL — SIGNIFICANT CHANGE UP (ref 7–23)
CALCIUM SERPL-MCNC: 7.9 MG/DL — LOW (ref 8.4–10.5)
CHLORIDE SERPL-SCNC: 103 MMOL/L — SIGNIFICANT CHANGE UP (ref 96–108)
CO2 SERPL-SCNC: 22 MMOL/L — SIGNIFICANT CHANGE UP (ref 22–31)
CREAT SERPL-MCNC: 0.66 MG/DL — SIGNIFICANT CHANGE UP (ref 0.5–1.3)
GLUCOSE SERPL-MCNC: 166 MG/DL — HIGH (ref 70–99)
HBA1C BLD-MCNC: 5.8 % — HIGH (ref 4–5.6)
HCT VFR BLD CALC: 31 % — LOW (ref 34.5–45)
HGB BLD-MCNC: 10.9 G/DL — LOW (ref 11.5–15.5)
MAGNESIUM SERPL-MCNC: 2.5 MG/DL — SIGNIFICANT CHANGE UP (ref 1.6–2.6)
MCHC RBC-ENTMCNC: 34.8 PG — HIGH (ref 27–34)
MCHC RBC-ENTMCNC: 35.1 GM/DL — SIGNIFICANT CHANGE UP (ref 32–36)
MCV RBC AUTO: 99 FL — SIGNIFICANT CHANGE UP (ref 80–100)
PHOSPHATE SERPL-MCNC: 3.2 MG/DL — SIGNIFICANT CHANGE UP (ref 2.5–4.5)
PLATELET # BLD AUTO: 159 K/UL — SIGNIFICANT CHANGE UP (ref 150–400)
POTASSIUM SERPL-MCNC: 3.7 MMOL/L — SIGNIFICANT CHANGE UP (ref 3.5–5.3)
POTASSIUM SERPL-SCNC: 3.7 MMOL/L — SIGNIFICANT CHANGE UP (ref 3.5–5.3)
RBC # BLD: 3.13 M/UL — LOW (ref 3.8–5.2)
RBC # FLD: 15.3 % — HIGH (ref 10.3–14.5)
SODIUM SERPL-SCNC: 142 MMOL/L — SIGNIFICANT CHANGE UP (ref 135–145)
WBC # BLD: 11 K/UL — HIGH (ref 3.8–10.5)
WBC # FLD AUTO: 11 K/UL — HIGH (ref 3.8–10.5)

## 2017-03-15 PROCEDURE — 99291 CRITICAL CARE FIRST HOUR: CPT | Mod: 25

## 2017-03-15 PROCEDURE — 93010 ELECTROCARDIOGRAM REPORT: CPT

## 2017-03-15 PROCEDURE — 88307 TISSUE EXAM BY PATHOLOGIST: CPT | Mod: 26

## 2017-03-15 PROCEDURE — 88341 IMHCHEM/IMCYTCHM EA ADD ANTB: CPT | Mod: 26,59

## 2017-03-15 PROCEDURE — 61781 SCAN PROC CRANIAL INTRA: CPT

## 2017-03-15 PROCEDURE — 88360 TUMOR IMMUNOHISTOCHEM/MANUAL: CPT | Mod: 26

## 2017-03-15 PROCEDURE — 88342 IMHCHEM/IMCYTCHM 1ST ANTB: CPT | Mod: 26,59

## 2017-03-15 PROCEDURE — 61510 CRNEC TREPH EXC BRN TUM STTL: CPT

## 2017-03-15 RX ORDER — ONDANSETRON 8 MG/1
4 TABLET, FILM COATED ORAL
Qty: 0 | Refills: 0 | Status: DISCONTINUED | OUTPATIENT
Start: 2017-03-15 | End: 2017-03-16

## 2017-03-15 RX ORDER — DEXAMETHASONE 0.5 MG/5ML
4 ELIXIR ORAL EVERY 6 HOURS
Qty: 0 | Refills: 0 | Status: DISCONTINUED | OUTPATIENT
Start: 2017-03-15 | End: 2017-03-15

## 2017-03-15 RX ORDER — HYDROMORPHONE HYDROCHLORIDE 2 MG/ML
0.5 INJECTION INTRAMUSCULAR; INTRAVENOUS; SUBCUTANEOUS
Qty: 0 | Refills: 0 | Status: DISCONTINUED | OUTPATIENT
Start: 2017-03-15 | End: 2017-03-15

## 2017-03-15 RX ORDER — LEVETIRACETAM 250 MG/1
1000 TABLET, FILM COATED ORAL
Qty: 0 | Refills: 0 | Status: DISCONTINUED | OUTPATIENT
Start: 2017-03-15 | End: 2017-03-22

## 2017-03-15 RX ORDER — DEXAMETHASONE 0.5 MG/5ML
4 ELIXIR ORAL EVERY 6 HOURS
Qty: 0 | Refills: 0 | Status: DISCONTINUED | OUTPATIENT
Start: 2017-03-15 | End: 2017-03-16

## 2017-03-15 RX ORDER — INSULIN LISPRO 100/ML
VIAL (ML) SUBCUTANEOUS
Qty: 0 | Refills: 0 | Status: DISCONTINUED | OUTPATIENT
Start: 2017-03-15 | End: 2017-03-22

## 2017-03-15 RX ORDER — DOCUSATE SODIUM 100 MG
100 CAPSULE ORAL THREE TIMES A DAY
Qty: 0 | Refills: 0 | Status: DISCONTINUED | OUTPATIENT
Start: 2017-03-15 | End: 2017-03-22

## 2017-03-15 RX ORDER — ACETAMINOPHEN 500 MG
650 TABLET ORAL EVERY 6 HOURS
Qty: 0 | Refills: 0 | Status: DISCONTINUED | OUTPATIENT
Start: 2017-03-15 | End: 2017-03-22

## 2017-03-15 RX ORDER — PANTOPRAZOLE SODIUM 20 MG/1
40 TABLET, DELAYED RELEASE ORAL DAILY
Qty: 0 | Refills: 0 | Status: DISCONTINUED | OUTPATIENT
Start: 2017-03-15 | End: 2017-03-22

## 2017-03-15 RX ORDER — CEFAZOLIN SODIUM 1 G
1000 VIAL (EA) INJECTION EVERY 8 HOURS
Qty: 0 | Refills: 0 | Status: COMPLETED | OUTPATIENT
Start: 2017-03-15 | End: 2017-03-16

## 2017-03-15 RX ORDER — GABAPENTIN 400 MG/1
100 CAPSULE ORAL THREE TIMES A DAY
Qty: 0 | Refills: 0 | Status: DISCONTINUED | OUTPATIENT
Start: 2017-03-15 | End: 2017-03-20

## 2017-03-15 RX ORDER — DEXTROSE MONOHYDRATE, SODIUM CHLORIDE, AND POTASSIUM CHLORIDE 50; .745; 4.5 G/1000ML; G/1000ML; G/1000ML
1000 INJECTION, SOLUTION INTRAVENOUS
Qty: 0 | Refills: 0 | Status: DISCONTINUED | OUTPATIENT
Start: 2017-03-15 | End: 2017-03-16

## 2017-03-15 RX ORDER — LEVETIRACETAM 250 MG/1
500 TABLET, FILM COATED ORAL
Qty: 0 | Refills: 0 | Status: DISCONTINUED | OUTPATIENT
Start: 2017-03-15 | End: 2017-03-15

## 2017-03-15 RX ORDER — ACETAMINOPHEN 500 MG
1000 TABLET ORAL ONCE
Qty: 0 | Refills: 0 | Status: COMPLETED | OUTPATIENT
Start: 2017-03-15 | End: 2017-03-15

## 2017-03-15 RX ORDER — ONDANSETRON 8 MG/1
4 TABLET, FILM COATED ORAL EVERY 6 HOURS
Qty: 0 | Refills: 0 | Status: DISCONTINUED | OUTPATIENT
Start: 2017-03-15 | End: 2017-03-22

## 2017-03-15 RX ADMIN — LEVETIRACETAM 500 MILLIGRAM(S): 250 TABLET, FILM COATED ORAL at 05:10

## 2017-03-15 RX ADMIN — PANTOPRAZOLE SODIUM 40 MILLIGRAM(S): 20 TABLET, DELAYED RELEASE ORAL at 05:11

## 2017-03-15 RX ADMIN — Medication 100 MILLIGRAM(S): at 22:02

## 2017-03-15 RX ADMIN — Medication 1000 MILLIGRAM(S): at 22:55

## 2017-03-15 RX ADMIN — SODIUM CHLORIDE 75 MILLILITER(S): 9 INJECTION INTRAMUSCULAR; INTRAVENOUS; SUBCUTANEOUS at 10:21

## 2017-03-15 RX ADMIN — DEXTROSE MONOHYDRATE, SODIUM CHLORIDE, AND POTASSIUM CHLORIDE 125 MILLILITER(S): 50; .745; 4.5 INJECTION, SOLUTION INTRAVENOUS at 22:03

## 2017-03-15 RX ADMIN — Medication 4 MILLIGRAM(S): at 23:33

## 2017-03-15 RX ADMIN — HYDROMORPHONE HYDROCHLORIDE 0.5 MILLIGRAM(S): 2 INJECTION INTRAMUSCULAR; INTRAVENOUS; SUBCUTANEOUS at 19:00

## 2017-03-15 RX ADMIN — HYDROMORPHONE HYDROCHLORIDE 0.5 MILLIGRAM(S): 2 INJECTION INTRAMUSCULAR; INTRAVENOUS; SUBCUTANEOUS at 19:38

## 2017-03-15 RX ADMIN — HYDROMORPHONE HYDROCHLORIDE 0.5 MILLIGRAM(S): 2 INJECTION INTRAMUSCULAR; INTRAVENOUS; SUBCUTANEOUS at 18:50

## 2017-03-15 RX ADMIN — Medication 100 MILLIGRAM(S): at 05:10

## 2017-03-15 RX ADMIN — Medication 4 MILLIGRAM(S): at 05:10

## 2017-03-15 RX ADMIN — Medication 400 MILLIGRAM(S): at 22:02

## 2017-03-15 RX ADMIN — Medication 4 MILLIGRAM(S): at 10:21

## 2017-03-16 ENCOUNTER — TRANSCRIPTION ENCOUNTER (OUTPATIENT)
Age: 57
End: 2017-03-16

## 2017-03-16 PROBLEM — Z00.00 ENCOUNTER FOR PREVENTIVE HEALTH EXAMINATION: Noted: 2017-03-16

## 2017-03-16 LAB
ANION GAP SERPL CALC-SCNC: 14 MMOL/L — SIGNIFICANT CHANGE UP (ref 5–17)
BUN SERPL-MCNC: 8 MG/DL — SIGNIFICANT CHANGE UP (ref 7–23)
CALCIUM SERPL-MCNC: 8 MG/DL — LOW (ref 8.4–10.5)
CHLORIDE SERPL-SCNC: 104 MMOL/L — SIGNIFICANT CHANGE UP (ref 96–108)
CO2 SERPL-SCNC: 21 MMOL/L — LOW (ref 22–31)
CREAT SERPL-MCNC: 0.67 MG/DL — SIGNIFICANT CHANGE UP (ref 0.5–1.3)
GLUCOSE SERPL-MCNC: 159 MG/DL — HIGH (ref 70–99)
HCT VFR BLD CALC: 28.5 % — LOW (ref 34.5–45)
HGB BLD-MCNC: 9.6 G/DL — LOW (ref 11.5–15.5)
MCHC RBC-ENTMCNC: 33.7 GM/DL — SIGNIFICANT CHANGE UP (ref 32–36)
MCHC RBC-ENTMCNC: 33.9 PG — SIGNIFICANT CHANGE UP (ref 27–34)
MCV RBC AUTO: 101 FL — HIGH (ref 80–100)
PLATELET # BLD AUTO: 157 K/UL — SIGNIFICANT CHANGE UP (ref 150–400)
POTASSIUM SERPL-MCNC: 4.6 MMOL/L — SIGNIFICANT CHANGE UP (ref 3.5–5.3)
POTASSIUM SERPL-SCNC: 4.6 MMOL/L — SIGNIFICANT CHANGE UP (ref 3.5–5.3)
RBC # BLD: 2.84 M/UL — LOW (ref 3.8–5.2)
RBC # FLD: 15.3 % — HIGH (ref 10.3–14.5)
SODIUM SERPL-SCNC: 139 MMOL/L — SIGNIFICANT CHANGE UP (ref 135–145)
WBC # BLD: 10.6 K/UL — HIGH (ref 3.8–10.5)
WBC # FLD AUTO: 10.6 K/UL — HIGH (ref 3.8–10.5)

## 2017-03-16 PROCEDURE — 70450 CT HEAD/BRAIN W/O DYE: CPT | Mod: 26

## 2017-03-16 PROCEDURE — 70553 MRI BRAIN STEM W/O & W/DYE: CPT | Mod: 26

## 2017-03-16 PROCEDURE — 99233 SBSQ HOSP IP/OBS HIGH 50: CPT

## 2017-03-16 RX ORDER — DEXAMETHASONE 0.5 MG/5ML
4 ELIXIR ORAL EVERY 6 HOURS
Qty: 0 | Refills: 0 | Status: DISCONTINUED | OUTPATIENT
Start: 2017-03-16 | End: 2017-03-18

## 2017-03-16 RX ORDER — ENOXAPARIN SODIUM 100 MG/ML
40 INJECTION SUBCUTANEOUS
Qty: 0 | Refills: 0 | Status: DISCONTINUED | OUTPATIENT
Start: 2017-03-16 | End: 2017-03-22

## 2017-03-16 RX ORDER — HYDROMORPHONE HYDROCHLORIDE 2 MG/ML
0.5 INJECTION INTRAMUSCULAR; INTRAVENOUS; SUBCUTANEOUS ONCE
Qty: 0 | Refills: 0 | Status: DISCONTINUED | OUTPATIENT
Start: 2017-03-16 | End: 2017-03-16

## 2017-03-16 RX ORDER — CALCIUM GLUCONATE 100 MG/ML
1 VIAL (ML) INTRAVENOUS ONCE
Qty: 0 | Refills: 0 | Status: COMPLETED | OUTPATIENT
Start: 2017-03-16 | End: 2017-03-16

## 2017-03-16 RX ORDER — CALCIUM GLUCONATE 100 MG/ML
2 VIAL (ML) INTRAVENOUS ONCE
Qty: 0 | Refills: 0 | Status: DISCONTINUED | OUTPATIENT
Start: 2017-03-16 | End: 2017-03-16

## 2017-03-16 RX ORDER — POTASSIUM CHLORIDE 20 MEQ
10 PACKET (EA) ORAL
Qty: 0 | Refills: 0 | Status: COMPLETED | OUTPATIENT
Start: 2017-03-16 | End: 2017-03-16

## 2017-03-16 RX ORDER — POTASSIUM CHLORIDE 20 MEQ
40 PACKET (EA) ORAL ONCE
Qty: 0 | Refills: 0 | Status: DISCONTINUED | OUTPATIENT
Start: 2017-03-16 | End: 2017-03-16

## 2017-03-16 RX ORDER — DEXTROSE MONOHYDRATE, SODIUM CHLORIDE, AND POTASSIUM CHLORIDE 50; .745; 4.5 G/1000ML; G/1000ML; G/1000ML
1000 INJECTION, SOLUTION INTRAVENOUS
Qty: 0 | Refills: 0 | Status: DISCONTINUED | OUTPATIENT
Start: 2017-03-16 | End: 2017-03-17

## 2017-03-16 RX ADMIN — Medication 4 MILLIGRAM(S): at 12:02

## 2017-03-16 RX ADMIN — PANTOPRAZOLE SODIUM 40 MILLIGRAM(S): 20 TABLET, DELAYED RELEASE ORAL at 12:02

## 2017-03-16 RX ADMIN — HYDROMORPHONE HYDROCHLORIDE 0.5 MILLIGRAM(S): 2 INJECTION INTRAMUSCULAR; INTRAVENOUS; SUBCUTANEOUS at 04:00

## 2017-03-16 RX ADMIN — LEVETIRACETAM 1000 MILLIGRAM(S): 250 TABLET, FILM COATED ORAL at 18:08

## 2017-03-16 RX ADMIN — DEXTROSE MONOHYDRATE, SODIUM CHLORIDE, AND POTASSIUM CHLORIDE 75 MILLILITER(S): 50; .745; 4.5 INJECTION, SOLUTION INTRAVENOUS at 08:47

## 2017-03-16 RX ADMIN — Medication 100 MILLIGRAM(S): at 22:36

## 2017-03-16 RX ADMIN — Medication 4 MILLIGRAM(S): at 06:00

## 2017-03-16 RX ADMIN — Medication 4 MILLIGRAM(S): at 18:07

## 2017-03-16 RX ADMIN — HYDROMORPHONE HYDROCHLORIDE 0.5 MILLIGRAM(S): 2 INJECTION INTRAMUSCULAR; INTRAVENOUS; SUBCUTANEOUS at 03:00

## 2017-03-16 RX ADMIN — Medication 2: at 22:36

## 2017-03-16 RX ADMIN — Medication 100 MILLIGRAM(S): at 06:27

## 2017-03-16 RX ADMIN — Medication 100 MILLIEQUIVALENT(S): at 04:10

## 2017-03-16 RX ADMIN — LEVETIRACETAM 1000 MILLIGRAM(S): 250 TABLET, FILM COATED ORAL at 06:27

## 2017-03-16 RX ADMIN — Medication 100 MILLIEQUIVALENT(S): at 03:00

## 2017-03-16 RX ADMIN — Medication 100 MILLIEQUIVALENT(S): at 02:03

## 2017-03-16 RX ADMIN — Medication 100 MILLIGRAM(S): at 06:00

## 2017-03-16 RX ADMIN — Medication 200 GRAM(S): at 01:58

## 2017-03-16 RX ADMIN — Medication 100 MILLIGRAM(S): at 13:35

## 2017-03-16 RX ADMIN — ENOXAPARIN SODIUM 40 MILLIGRAM(S): 100 INJECTION SUBCUTANEOUS at 18:07

## 2017-03-16 RX ADMIN — DEXTROSE MONOHYDRATE, SODIUM CHLORIDE, AND POTASSIUM CHLORIDE 125 MILLILITER(S): 50; .745; 4.5 INJECTION, SOLUTION INTRAVENOUS at 06:00

## 2017-03-17 LAB
ANION GAP SERPL CALC-SCNC: 14 MMOL/L — SIGNIFICANT CHANGE UP (ref 5–17)
BUN SERPL-MCNC: 12 MG/DL — SIGNIFICANT CHANGE UP (ref 7–23)
CALCIUM SERPL-MCNC: 8.9 MG/DL — SIGNIFICANT CHANGE UP (ref 8.4–10.5)
CHLORIDE SERPL-SCNC: 104 MMOL/L — SIGNIFICANT CHANGE UP (ref 96–108)
CO2 SERPL-SCNC: 22 MMOL/L — SIGNIFICANT CHANGE UP (ref 22–31)
CREAT SERPL-MCNC: 0.56 MG/DL — SIGNIFICANT CHANGE UP (ref 0.5–1.3)
GLUCOSE SERPL-MCNC: 187 MG/DL — HIGH (ref 70–99)
HCT VFR BLD CALC: 33.2 % — LOW (ref 34.5–45)
HGB BLD-MCNC: 11.2 G/DL — LOW (ref 11.5–15.5)
MCHC RBC-ENTMCNC: 33.9 GM/DL — SIGNIFICANT CHANGE UP (ref 32–36)
MCHC RBC-ENTMCNC: 34.3 PG — HIGH (ref 27–34)
MCV RBC AUTO: 101 FL — HIGH (ref 80–100)
PLATELET # BLD AUTO: 169 K/UL — SIGNIFICANT CHANGE UP (ref 150–400)
POTASSIUM SERPL-MCNC: 4.2 MMOL/L — SIGNIFICANT CHANGE UP (ref 3.5–5.3)
POTASSIUM SERPL-SCNC: 4.2 MMOL/L — SIGNIFICANT CHANGE UP (ref 3.5–5.3)
RBC # BLD: 3.28 M/UL — LOW (ref 3.8–5.2)
RBC # FLD: 15.4 % — HIGH (ref 10.3–14.5)
SODIUM SERPL-SCNC: 140 MMOL/L — SIGNIFICANT CHANGE UP (ref 135–145)
WBC # BLD: 9.4 K/UL — SIGNIFICANT CHANGE UP (ref 3.8–10.5)
WBC # FLD AUTO: 9.4 K/UL — SIGNIFICANT CHANGE UP (ref 3.8–10.5)

## 2017-03-17 RX ADMIN — Medication 4 MILLIGRAM(S): at 23:11

## 2017-03-17 RX ADMIN — Medication 4 MILLIGRAM(S): at 00:00

## 2017-03-17 RX ADMIN — ENOXAPARIN SODIUM 40 MILLIGRAM(S): 100 INJECTION SUBCUTANEOUS at 17:30

## 2017-03-17 RX ADMIN — Medication 4 MILLIGRAM(S): at 12:31

## 2017-03-17 RX ADMIN — LEVETIRACETAM 1000 MILLIGRAM(S): 250 TABLET, FILM COATED ORAL at 17:30

## 2017-03-17 RX ADMIN — PANTOPRAZOLE SODIUM 40 MILLIGRAM(S): 20 TABLET, DELAYED RELEASE ORAL at 12:31

## 2017-03-17 RX ADMIN — Medication 4 MILLIGRAM(S): at 05:07

## 2017-03-17 RX ADMIN — Medication 4 MILLIGRAM(S): at 17:30

## 2017-03-17 RX ADMIN — Medication 100 MILLIGRAM(S): at 23:08

## 2017-03-17 RX ADMIN — LEVETIRACETAM 1000 MILLIGRAM(S): 250 TABLET, FILM COATED ORAL at 05:07

## 2017-03-17 RX ADMIN — Medication 100 MILLIGRAM(S): at 13:06

## 2017-03-17 RX ADMIN — Medication 100 MILLIGRAM(S): at 05:07

## 2017-03-17 RX ADMIN — Medication 2: at 13:04

## 2017-03-17 NOTE — PHYSICAL THERAPY INITIAL EVALUATION ADULT - ADDITIONAL COMMENTS
pt has a few steps to enter, owns a RW and w/c.  pt has assistance with all ADL's and mobility from her HHA and from her .  pt is mostly a household ambulator.  pt attends outpt physical therapy for balance, ROM, strengthening

## 2017-03-17 NOTE — PHYSICAL THERAPY INITIAL EVALUATION ADULT - IMPAIRMENTS CONTRIBUTING TO GAIT DEVIATIONS, PT EVAL
decreased strength/impaired balance/impaired postural control/steadying assist, assist to navigate the RW at times due to L UE weakness

## 2017-03-17 NOTE — PHYSICAL THERAPY INITIAL EVALUATION ADULT - THERAPY FREQUENCY, PT EVAL
pt appears to be at her functional baseline and will be d/c from physical therapy.  pt to be given therex Level 2 packet and will be placed on ambulation aide.

## 2017-03-17 NOTE — OCCUPATIONAL THERAPY INITIAL EVALUATION ADULT - RANGE OF MOTION EXAMINATION, UPPER EXTREMITY
Right UE Active ROM was WNL (within normal limits)/limited a/p rom of left shoulder. Full elbow flexion/extension. supination to neutral only. digits frozen in extension except for thumb

## 2017-03-17 NOTE — PHYSICAL THERAPY INITIAL EVALUATION ADULT - PERTINENT HX OF CURRENT PROBLEM, REHAB EVAL
56F hx metastatic breast ca w/ mets to bone, lung, lymph nodes dx 3/2014 s/p cxt/xrt s/p bl mastectomy w/ LN dissection, chronic paresthesias and L brachial neuritis, on xeloda (last 3/12/17) p/w headaches x 4d.  MRI  infra cranial metastatic disease, vasogenic edema and mass effect.  Pt is now POD 2 s/p craniotomy for occipital tumor resection.

## 2017-03-17 NOTE — OCCUPATIONAL THERAPY INITIAL EVALUATION ADULT - ADDITIONAL COMMENTS
pt has 45 hours a week of hha services to assist with all adl's secondary to limited use of righ ur and decreased independence in transfers and adl's. When aid not present  is generally able to be home to assist

## 2017-03-18 RX ORDER — DEXAMETHASONE 0.5 MG/5ML
4 ELIXIR ORAL EVERY 8 HOURS
Qty: 0 | Refills: 0 | Status: DISCONTINUED | OUTPATIENT
Start: 2017-03-18 | End: 2017-03-20

## 2017-03-18 RX ADMIN — Medication: at 22:00

## 2017-03-18 RX ADMIN — Medication 4 MILLIGRAM(S): at 21:27

## 2017-03-18 RX ADMIN — LEVETIRACETAM 1000 MILLIGRAM(S): 250 TABLET, FILM COATED ORAL at 17:10

## 2017-03-18 RX ADMIN — Medication 4 MILLIGRAM(S): at 11:40

## 2017-03-18 RX ADMIN — Medication: at 17:50

## 2017-03-18 RX ADMIN — Medication 100 MILLIGRAM(S): at 21:27

## 2017-03-18 RX ADMIN — Medication 100 MILLIGRAM(S): at 15:13

## 2017-03-18 RX ADMIN — Medication 100 MILLIGRAM(S): at 06:31

## 2017-03-18 RX ADMIN — LEVETIRACETAM 1000 MILLIGRAM(S): 250 TABLET, FILM COATED ORAL at 06:31

## 2017-03-18 RX ADMIN — PANTOPRAZOLE SODIUM 40 MILLIGRAM(S): 20 TABLET, DELAYED RELEASE ORAL at 11:40

## 2017-03-18 RX ADMIN — ENOXAPARIN SODIUM 40 MILLIGRAM(S): 100 INJECTION SUBCUTANEOUS at 17:09

## 2017-03-18 RX ADMIN — Medication 4 MILLIGRAM(S): at 06:31

## 2017-03-19 PROCEDURE — 71010: CPT | Mod: 26

## 2017-03-19 RX ADMIN — LEVETIRACETAM 1000 MILLIGRAM(S): 250 TABLET, FILM COATED ORAL at 18:01

## 2017-03-19 RX ADMIN — Medication 4 MILLIGRAM(S): at 05:42

## 2017-03-19 RX ADMIN — Medication 100 MILLIGRAM(S): at 05:43

## 2017-03-19 RX ADMIN — PANTOPRAZOLE SODIUM 40 MILLIGRAM(S): 20 TABLET, DELAYED RELEASE ORAL at 12:28

## 2017-03-19 RX ADMIN — Medication 100 MILLIGRAM(S): at 13:36

## 2017-03-19 RX ADMIN — LEVETIRACETAM 1000 MILLIGRAM(S): 250 TABLET, FILM COATED ORAL at 05:42

## 2017-03-19 RX ADMIN — Medication 4 MILLIGRAM(S): at 21:31

## 2017-03-19 RX ADMIN — Medication 100 MILLIGRAM(S): at 21:31

## 2017-03-19 RX ADMIN — Medication: at 21:26

## 2017-03-19 RX ADMIN — ENOXAPARIN SODIUM 40 MILLIGRAM(S): 100 INJECTION SUBCUTANEOUS at 18:01

## 2017-03-19 RX ADMIN — Medication 4 MILLIGRAM(S): at 13:36

## 2017-03-19 RX ADMIN — Medication: at 13:00

## 2017-03-20 RX ORDER — DEXAMETHASONE 0.5 MG/5ML
4 ELIXIR ORAL EVERY 12 HOURS
Qty: 0 | Refills: 0 | Status: DISCONTINUED | OUTPATIENT
Start: 2017-03-20 | End: 2017-03-22

## 2017-03-20 RX ADMIN — LEVETIRACETAM 1000 MILLIGRAM(S): 250 TABLET, FILM COATED ORAL at 06:19

## 2017-03-20 RX ADMIN — LEVETIRACETAM 1000 MILLIGRAM(S): 250 TABLET, FILM COATED ORAL at 18:08

## 2017-03-20 RX ADMIN — Medication 4 MILLIGRAM(S): at 18:08

## 2017-03-20 RX ADMIN — Medication 100 MILLIGRAM(S): at 06:19

## 2017-03-20 RX ADMIN — Medication 100 MILLIGRAM(S): at 21:25

## 2017-03-20 RX ADMIN — ENOXAPARIN SODIUM 40 MILLIGRAM(S): 100 INJECTION SUBCUTANEOUS at 18:08

## 2017-03-20 RX ADMIN — Medication 100 MILLIGRAM(S): at 12:49

## 2017-03-20 RX ADMIN — PANTOPRAZOLE SODIUM 40 MILLIGRAM(S): 20 TABLET, DELAYED RELEASE ORAL at 12:32

## 2017-03-20 RX ADMIN — Medication 2: at 12:46

## 2017-03-20 RX ADMIN — Medication 4 MILLIGRAM(S): at 06:19

## 2017-03-21 ENCOUNTER — TRANSCRIPTION ENCOUNTER (OUTPATIENT)
Age: 57
End: 2017-03-21

## 2017-03-21 LAB
ANION GAP SERPL CALC-SCNC: 12 MMOL/L — SIGNIFICANT CHANGE UP (ref 5–17)
BUN SERPL-MCNC: 16 MG/DL — SIGNIFICANT CHANGE UP (ref 7–23)
CALCIUM SERPL-MCNC: 9.1 MG/DL — SIGNIFICANT CHANGE UP (ref 8.4–10.5)
CHLORIDE SERPL-SCNC: 99 MMOL/L — SIGNIFICANT CHANGE UP (ref 96–108)
CO2 SERPL-SCNC: 25 MMOL/L — SIGNIFICANT CHANGE UP (ref 22–31)
CREAT SERPL-MCNC: 0.61 MG/DL — SIGNIFICANT CHANGE UP (ref 0.5–1.3)
GLUCOSE SERPL-MCNC: 121 MG/DL — HIGH (ref 70–99)
HCT VFR BLD CALC: 32.5 % — LOW (ref 34.5–45)
HGB BLD-MCNC: 11.2 G/DL — LOW (ref 11.5–15.5)
MCHC RBC-ENTMCNC: 34.3 PG — HIGH (ref 27–34)
MCHC RBC-ENTMCNC: 34.4 GM/DL — SIGNIFICANT CHANGE UP (ref 32–36)
MCV RBC AUTO: 99.8 FL — SIGNIFICANT CHANGE UP (ref 80–100)
PLATELET # BLD AUTO: 189 K/UL — SIGNIFICANT CHANGE UP (ref 150–400)
POTASSIUM SERPL-MCNC: 4.4 MMOL/L — SIGNIFICANT CHANGE UP (ref 3.5–5.3)
POTASSIUM SERPL-SCNC: 4.4 MMOL/L — SIGNIFICANT CHANGE UP (ref 3.5–5.3)
RBC # BLD: 3.25 M/UL — LOW (ref 3.8–5.2)
RBC # FLD: 15.6 % — HIGH (ref 10.3–14.5)
SODIUM SERPL-SCNC: 136 MMOL/L — SIGNIFICANT CHANGE UP (ref 135–145)
WBC # BLD: 9.4 K/UL — SIGNIFICANT CHANGE UP (ref 3.8–10.5)
WBC # FLD AUTO: 9.4 K/UL — SIGNIFICANT CHANGE UP (ref 3.8–10.5)

## 2017-03-21 RX ADMIN — PANTOPRAZOLE SODIUM 40 MILLIGRAM(S): 20 TABLET, DELAYED RELEASE ORAL at 13:10

## 2017-03-21 RX ADMIN — Medication 4 MILLIGRAM(S): at 17:41

## 2017-03-21 RX ADMIN — Medication 100 MILLIGRAM(S): at 05:58

## 2017-03-21 RX ADMIN — Medication: at 22:40

## 2017-03-21 RX ADMIN — LEVETIRACETAM 1000 MILLIGRAM(S): 250 TABLET, FILM COATED ORAL at 17:41

## 2017-03-21 RX ADMIN — Medication 2: at 13:10

## 2017-03-21 RX ADMIN — Medication 100 MILLIGRAM(S): at 14:08

## 2017-03-21 RX ADMIN — Medication 100 MILLIGRAM(S): at 21:16

## 2017-03-21 RX ADMIN — Medication 4 MILLIGRAM(S): at 05:58

## 2017-03-21 RX ADMIN — LEVETIRACETAM 1000 MILLIGRAM(S): 250 TABLET, FILM COATED ORAL at 05:58

## 2017-03-21 RX ADMIN — ENOXAPARIN SODIUM 40 MILLIGRAM(S): 100 INJECTION SUBCUTANEOUS at 17:41

## 2017-03-21 NOTE — DISCHARGE NOTE ADULT - NS AS DC STROKE ED MATERIALS
Need for Followup After Discharge/Stroke Education Booklet/Call 911 for Stroke/Risk Factors for Stroke/Stroke Warning Signs and Symptoms/Prescribed Medications

## 2017-03-21 NOTE — DISCHARGE NOTE ADULT - NS AS ACTIVITY OBS
Showering allowed/Walking-Outdoors allowed/No Heavy lifting/straining/Walking-Indoors allowed/Do not drive or operate machinery

## 2017-03-21 NOTE — DISCHARGE NOTE ADULT - CARE PROVIDER_API CALL
Ned Estes (MD), Neurosurgery  General  [address] [place] [address]  Phone: [phone]  Fax: [phone]    Derian Byrd (MD), Internal Medicine; Medical Oncology  [address] [address]  Phone: [phone]  Fax: [phone]    Poli Capps), Therapeutic Radiology  [address] [place] [address]  Phone: [phone]  Fax: [phone]    Chantel Araujo (DIMITRIOS), Internal Medicine  [address] [place] [address]  Phone: [phone]

## 2017-03-21 NOTE — DISCHARGE NOTE ADULT - PLAN OF CARE
s/p Right parietooccipital craniotomy/ resection of right occipital lesion on 3/15/17 Needs radiation treatment. follow up with Dr Capps in 1 week for planning  No strenous activity. No heavy lifting. Do not return to work until cleared by physician. No driving until cleared by physician. Follow up with Dr Byrd for further treatment planning in 1 week Needs radiation treatment. follow up with Dr Capps in 1 week for planning  No strenous activity. No heavy lifting. Do not return to work until cleared by physician. No driving until cleared by physician.  Follow up visit with Dr. Estes: March 28, 2017 @ 10:20am

## 2017-03-21 NOTE — DISCHARGE NOTE ADULT - CARE PLAN
Principal Discharge DX:	Brain metastases  Goal:	s/p Right parietooccipital craniotomy/ resection of right occipital lesion on 3/15/17  Instructions for follow-up, activity and diet:	Needs radiation treatment. follow up with Dr Capps in 1 week for planning  No strenous activity. No heavy lifting. Do not return to work until cleared by physician. No driving until cleared by physician.  Secondary Diagnosis:	Breast cancer  Instructions for follow-up, activity and diet:	Follow up with Dr Byrd for further treatment planning in 1 week Principal Discharge DX:	Brain metastases  Goal:	s/p Right parietooccipital craniotomy/ resection of right occipital lesion on 3/15/17  Instructions for follow-up, activity and diet:	Needs radiation treatment. follow up with Dr Capps in 1 week for planning  No strenous activity. No heavy lifting. Do not return to work until cleared by physician. No driving until cleared by physician.  Follow up visit with Dr. Silvaub: March 28, 2017 @ 10:20am  Secondary Diagnosis:	Breast cancer  Instructions for follow-up, activity and diet:	Follow up with Dr Byrd for further treatment planning in 1 week

## 2017-03-21 NOTE — DISCHARGE NOTE ADULT - REASON FOR ADMISSION
56F hx metastatic breast ca w/ mets to bone, lung, lymph nodes dx 3/2014 s/p cxt/xrt s/p bl mastectomy w/ LN dissection, chronic paresthesias and L brachial neuritis, on xeloda (last 3/12/17) p/w headaches x 4d. history obtained from patient and family at bedside. notes on sunday she began to have achy, diffuse headaches, 7/10 in intensity, associated with mild blurry vision, mildly relieved with tylenol and for the most part relieved with nsaids. she has chronic paresthesias of the feet from chemo however denies worsening paresthesias, weakness/numbness of her extremities. she went to see her oncologist who recommended an MRI brain which showed metastatic disease to brain 56F hx metastatic breast ca w/ mets to bone, lung, lymph nodes dx 3/2014 s/p cxt/xrt s/p bl mastectomy w/ LN dissection, chronic paresthesias and L brachial neuritis, on xeloda (last 3/12/17) p/w headaches x 4days 7/10 in intensity, associated with mild blurry vision, She  has chronic paresthesias of the feet from chemo however denies worsening paresthesias, weakness/numbness of her extremities. Outpatient MRI brain showed metastatic disease

## 2017-03-21 NOTE — DISCHARGE NOTE ADULT - CARE PROVIDERS DIRECT ADDRESSES
[email or web address]

## 2017-03-21 NOTE — DISCHARGE NOTE ADULT - ADDITIONAL INSTRUCTIONS
Return to ER if develops fevers, bleeding , wound drainage, uncontrolled pain, weakness of extremities, lethargy or sluggishness..  Incision evaluation and staple removal in 1 week. Return to ER if develops fevers, bleeding , wound drainage, uncontrolled pain, weakness of extremities, lethargy or sluggishness..    follow up with Dr Capps in 1 week for radiation treatment  planning  Follow up with Dr Byrd for further treatment planning in 1 week. Follow up pathology results  Follow up with Dr Estes in 1 week. Incision evaluation and staple removal in 1 week.

## 2017-03-21 NOTE — DISCHARGE NOTE ADULT - HOSPITAL COURSE
56F hx metastatic breast ca w/ mets to bone, lung, lymph nodes dx 3/2014 s/p cxt/xrt s/p bl mastectomy w/ LN dissection, chronic paresthesias and L brachial neuritis, on xeloda (last 3/12/17) p/w headaches x 4days 7/10 in intensity, associated with mild blurry vision, She  has chronic paresthesias of the feet from chemo however denies worsening paresthesias, weakness/numbness of her extremities. Outpatient MRI brain showed metastatic disease to brain Right occipital lesion and left posterior frontal lesion. Transferred to [facility] for further neurosurgical evaluation. s/p Right parietooccipital craniotomy/ resection of right occipital lesion on 3/15/17. Post op MRI brain.  Followed by medicine and oncology while inpatient. Evaluated by physical therapy and discharged home in stable condition.

## 2017-03-21 NOTE — DISCHARGE NOTE ADULT - MEDICATION SUMMARY - MEDICATIONS TO STOP TAKING
I will STOP taking the medications listed below when I get home from the hospital:    oxyCODONE 5 mg oral tablet  -- 1 tab(s) by mouth every 6 hours, As Needed

## 2017-03-21 NOTE — DISCHARGE NOTE ADULT - PATIENT PORTAL LINK FT
“You can access the [facility] Patient Portal, offered by [facility], by registering with the following website: [email or web address]”

## 2017-03-21 NOTE — DISCHARGE NOTE ADULT - MEDICATION SUMMARY - MEDICATIONS TO TAKE
I will START or STAY ON the medications listed below when I get home from the hospital:    dexamethasone 1 mg oral tablet  -- 4 milligram(s) by mouth every 12 hours for 3 days, then 3mg by mouth every 12 hours for 3 days, then 2mg by mouth every 12 hours for 3 days, then 1mg by mouth every 12 hours for 3 days, then stop  -- Indication: For Brain metastases    Roxicodone 5 mg oral tablet  -- 1 tab(s) by mouth every 6 hours prn as needed -for severe pain MDD:4 tabs  -- Indication: For As needed for pain    Keppra 1000 mg oral tablet  -- 1 tab(s) by mouth 2 times a day  -- Indication: For seizure prophyllaxis    gabapentin 100 mg oral capsule  -- 3 cap(s) by mouth 3 times a day, As Needed  -- Indication: For Brain metastases    Xeloda  --  by mouth   -- Indication: For Breast cancer

## 2017-03-22 VITALS
TEMPERATURE: 98 F | SYSTOLIC BLOOD PRESSURE: 106 MMHG | RESPIRATION RATE: 17 BRPM | DIASTOLIC BLOOD PRESSURE: 74 MMHG | OXYGEN SATURATION: 100 % | HEART RATE: 76 BPM

## 2017-03-22 PROCEDURE — 85610 PROTHROMBIN TIME: CPT

## 2017-03-22 PROCEDURE — 97162 PT EVAL MOD COMPLEX 30 MIN: CPT

## 2017-03-22 PROCEDURE — 88307 TISSUE EXAM BY PATHOLOGIST: CPT

## 2017-03-22 PROCEDURE — 70450 CT HEAD/BRAIN W/O DYE: CPT

## 2017-03-22 PROCEDURE — 85730 THROMBOPLASTIN TIME PARTIAL: CPT

## 2017-03-22 PROCEDURE — 71045 X-RAY EXAM CHEST 1 VIEW: CPT

## 2017-03-22 PROCEDURE — 84100 ASSAY OF PHOSPHORUS: CPT

## 2017-03-22 PROCEDURE — P9040: CPT

## 2017-03-22 PROCEDURE — 86900 BLOOD TYPING SEROLOGIC ABO: CPT

## 2017-03-22 PROCEDURE — 70552 MRI BRAIN STEM W/DYE: CPT

## 2017-03-22 PROCEDURE — 93005 ELECTROCARDIOGRAM TRACING: CPT

## 2017-03-22 PROCEDURE — 85027 COMPLETE CBC AUTOMATED: CPT

## 2017-03-22 PROCEDURE — C1889: CPT

## 2017-03-22 PROCEDURE — 88360 TUMOR IMMUNOHISTOCHEM/MANUAL: CPT

## 2017-03-22 PROCEDURE — 80048 BASIC METABOLIC PNL TOTAL CA: CPT

## 2017-03-22 PROCEDURE — 88341 IMHCHEM/IMCYTCHM EA ADD ANTB: CPT

## 2017-03-22 PROCEDURE — 97165 OT EVAL LOW COMPLEX 30 MIN: CPT

## 2017-03-22 PROCEDURE — C1713: CPT

## 2017-03-22 PROCEDURE — 86850 RBC ANTIBODY SCREEN: CPT

## 2017-03-22 PROCEDURE — C1769: CPT

## 2017-03-22 PROCEDURE — 86901 BLOOD TYPING SEROLOGIC RH(D): CPT

## 2017-03-22 PROCEDURE — 83735 ASSAY OF MAGNESIUM: CPT

## 2017-03-22 PROCEDURE — A9585: CPT

## 2017-03-22 PROCEDURE — 99285 EMERGENCY DEPT VISIT HI MDM: CPT | Mod: 25

## 2017-03-22 PROCEDURE — 83036 HEMOGLOBIN GLYCOSYLATED A1C: CPT

## 2017-03-22 PROCEDURE — 70553 MRI BRAIN STEM W/O & W/DYE: CPT

## 2017-03-22 PROCEDURE — 80076 HEPATIC FUNCTION PANEL: CPT

## 2017-03-22 PROCEDURE — 88342 IMHCHEM/IMCYTCHM 1ST ANTB: CPT

## 2017-03-22 PROCEDURE — 84702 CHORIONIC GONADOTROPIN TEST: CPT

## 2017-03-22 PROCEDURE — 86923 COMPATIBILITY TEST ELECTRIC: CPT

## 2017-03-22 RX ORDER — DEXAMETHASONE 0.5 MG/5ML
4 ELIXIR ORAL
Qty: 60 | Refills: 0 | OUTPATIENT
Start: 2017-03-22 | End: 2017-03-25

## 2017-03-22 RX ORDER — OXYCODONE HYDROCHLORIDE 5 MG/1
1 TABLET ORAL
Qty: 30 | Refills: 0 | OUTPATIENT
Start: 2017-03-22

## 2017-03-22 RX ORDER — LEVETIRACETAM 250 MG/1
1 TABLET, FILM COATED ORAL
Qty: 60 | Refills: 0 | OUTPATIENT
Start: 2017-03-22

## 2017-03-22 RX ADMIN — Medication 4 MILLIGRAM(S): at 05:43

## 2017-03-22 RX ADMIN — LEVETIRACETAM 1000 MILLIGRAM(S): 250 TABLET, FILM COATED ORAL at 05:43

## 2017-03-22 RX ADMIN — Medication 100 MILLIGRAM(S): at 05:43

## 2017-03-23 RX ORDER — OXYCODONE HYDROCHLORIDE 5 MG/1
1 TABLET ORAL
Qty: 0 | Refills: 0 | COMMUNITY

## 2017-03-27 ENCOUNTER — OUTPATIENT (OUTPATIENT)
Dept: OUTPATIENT SERVICES | Facility: HOSPITAL | Age: 57
LOS: 1 days | Discharge: HOME | End: 2017-03-27

## 2017-03-27 DIAGNOSIS — Z90.13 ACQUIRED ABSENCE OF BILATERAL BREASTS AND NIPPLES: Chronic | ICD-10-CM

## 2017-03-28 ENCOUNTER — APPOINTMENT (OUTPATIENT)
Dept: NEUROSURGERY | Facility: CLINIC | Age: 57
End: 2017-03-28

## 2017-03-28 VITALS
DIASTOLIC BLOOD PRESSURE: 68 MMHG | WEIGHT: 129 LBS | SYSTOLIC BLOOD PRESSURE: 116 MMHG | BODY MASS INDEX: 25.32 KG/M2 | HEART RATE: 65 BPM | HEIGHT: 60 IN

## 2017-03-28 DIAGNOSIS — C71.9 MALIGNANT NEOPLASM OF BRAIN, UNSPECIFIED: ICD-10-CM

## 2017-03-30 ENCOUNTER — APPOINTMENT (OUTPATIENT)
Dept: RADIATION ONCOLOGY | Facility: CLINIC | Age: 57
End: 2017-03-30

## 2017-03-30 DIAGNOSIS — C50.919 MALIGNANT NEOPLASM OF UNSPECIFIED SITE OF UNSPECIFIED FEMALE BREAST: ICD-10-CM

## 2017-04-05 ENCOUNTER — OUTPATIENT (OUTPATIENT)
Dept: OUTPATIENT SERVICES | Facility: HOSPITAL | Age: 57
LOS: 1 days | Discharge: HOME | End: 2017-04-05
Payer: MEDICAID

## 2017-04-05 DIAGNOSIS — Z90.13 ACQUIRED ABSENCE OF BILATERAL BREASTS AND NIPPLES: Chronic | ICD-10-CM

## 2017-04-06 ENCOUNTER — EMERGENCY (EMERGENCY)
Facility: HOSPITAL | Age: 57
LOS: 1 days | Discharge: HOME | End: 2017-04-06
Attending: EMERGENCY MEDICINE | Admitting: EMERGENCY MEDICINE
Payer: MEDICAID

## 2017-04-06 VITALS
HEART RATE: 73 BPM | OXYGEN SATURATION: 98 % | DIASTOLIC BLOOD PRESSURE: 70 MMHG | RESPIRATION RATE: 18 BRPM | SYSTOLIC BLOOD PRESSURE: 112 MMHG | TEMPERATURE: 99 F

## 2017-04-06 VITALS
RESPIRATION RATE: 16 BRPM | DIASTOLIC BLOOD PRESSURE: 87 MMHG | HEART RATE: 85 BPM | SYSTOLIC BLOOD PRESSURE: 145 MMHG | TEMPERATURE: 99 F | OXYGEN SATURATION: 99 %

## 2017-04-06 DIAGNOSIS — R51 HEADACHE: ICD-10-CM

## 2017-04-06 DIAGNOSIS — R20.2 PARESTHESIA OF SKIN: ICD-10-CM

## 2017-04-06 DIAGNOSIS — Z98.890 OTHER SPECIFIED POSTPROCEDURAL STATES: ICD-10-CM

## 2017-04-06 DIAGNOSIS — Z90.13 ACQUIRED ABSENCE OF BILATERAL BREASTS AND NIPPLES: Chronic | ICD-10-CM

## 2017-04-06 DIAGNOSIS — Z98.890 OTHER SPECIFIED POSTPROCEDURAL STATES: Chronic | ICD-10-CM

## 2017-04-06 LAB
ALBUMIN SERPL ELPH-MCNC: 4.1 G/DL — SIGNIFICANT CHANGE UP (ref 3.3–5)
ALP SERPL-CCNC: 73 U/L — SIGNIFICANT CHANGE UP (ref 40–120)
ALT FLD-CCNC: 14 U/L RC — SIGNIFICANT CHANGE UP (ref 10–45)
ANION GAP SERPL CALC-SCNC: 15 MMOL/L — SIGNIFICANT CHANGE UP (ref 5–17)
ANISOCYTOSIS BLD QL: SLIGHT — SIGNIFICANT CHANGE UP
AST SERPL-CCNC: 13 U/L — SIGNIFICANT CHANGE UP (ref 10–40)
BASOPHILS # BLD AUTO: 0 K/UL — SIGNIFICANT CHANGE UP (ref 0–0.2)
BASOPHILS NFR BLD AUTO: 0.1 % — SIGNIFICANT CHANGE UP (ref 0–2)
BILIRUB SERPL-MCNC: 0.4 MG/DL — SIGNIFICANT CHANGE UP (ref 0.2–1.2)
BUN SERPL-MCNC: 11 MG/DL — SIGNIFICANT CHANGE UP (ref 7–23)
CALCIUM SERPL-MCNC: 9.8 MG/DL — SIGNIFICANT CHANGE UP (ref 8.4–10.5)
CHLORIDE SERPL-SCNC: 102 MMOL/L — SIGNIFICANT CHANGE UP (ref 96–108)
CO2 SERPL-SCNC: 25 MMOL/L — SIGNIFICANT CHANGE UP (ref 22–31)
CREAT SERPL-MCNC: 0.61 MG/DL — SIGNIFICANT CHANGE UP (ref 0.5–1.3)
DACRYOCYTES BLD QL SMEAR: SLIGHT — SIGNIFICANT CHANGE UP
ELLIPTOCYTES BLD QL SMEAR: SLIGHT — SIGNIFICANT CHANGE UP
EOSINOPHIL # BLD AUTO: 0.1 K/UL — SIGNIFICANT CHANGE UP (ref 0–0.5)
EOSINOPHIL NFR BLD AUTO: 1.5 % — SIGNIFICANT CHANGE UP (ref 0–6)
GLUCOSE SERPL-MCNC: 78 MG/DL — SIGNIFICANT CHANGE UP (ref 70–99)
HCT VFR BLD CALC: 38.6 % — SIGNIFICANT CHANGE UP (ref 34.5–45)
HGB BLD-MCNC: 12.7 G/DL — SIGNIFICANT CHANGE UP (ref 11.5–15.5)
HYPOCHROMIA BLD QL: SLIGHT — SIGNIFICANT CHANGE UP
LYMPHOCYTES # BLD AUTO: 1.4 K/UL — SIGNIFICANT CHANGE UP (ref 1–3.3)
LYMPHOCYTES # BLD AUTO: 23.8 % — SIGNIFICANT CHANGE UP (ref 13–44)
MACROCYTES BLD QL: SLIGHT — SIGNIFICANT CHANGE UP
MAGNESIUM SERPL-MCNC: 2.8 MG/DL — HIGH (ref 1.6–2.6)
MCHC RBC-ENTMCNC: 33 GM/DL — SIGNIFICANT CHANGE UP (ref 32–36)
MCHC RBC-ENTMCNC: 33.7 PG — SIGNIFICANT CHANGE UP (ref 27–34)
MCV RBC AUTO: 102 FL — HIGH (ref 80–100)
MICROCYTES BLD QL: SLIGHT — SIGNIFICANT CHANGE UP
MONOCYTES # BLD AUTO: 0.4 K/UL — SIGNIFICANT CHANGE UP (ref 0–0.9)
MONOCYTES NFR BLD AUTO: 5.9 % — SIGNIFICANT CHANGE UP (ref 2–14)
NEUTROPHILS # BLD AUTO: 4.1 K/UL — SIGNIFICANT CHANGE UP (ref 1.8–7.4)
NEUTROPHILS NFR BLD AUTO: 68.7 % — SIGNIFICANT CHANGE UP (ref 43–77)
PHOSPHATE SERPL-MCNC: 3.3 MG/DL — SIGNIFICANT CHANGE UP (ref 2.5–4.5)
PLAT MORPH BLD: NORMAL — SIGNIFICANT CHANGE UP
PLATELET # BLD AUTO: 143 K/UL — LOW (ref 150–400)
POLYCHROMASIA BLD QL SMEAR: SLIGHT — SIGNIFICANT CHANGE UP
POTASSIUM SERPL-MCNC: 4.2 MMOL/L — SIGNIFICANT CHANGE UP (ref 3.5–5.3)
POTASSIUM SERPL-SCNC: 4.2 MMOL/L — SIGNIFICANT CHANGE UP (ref 3.5–5.3)
PROT SERPL-MCNC: 7.1 G/DL — SIGNIFICANT CHANGE UP (ref 6–8.3)
RBC # BLD: 3.78 M/UL — LOW (ref 3.8–5.2)
RBC # FLD: 15 % — HIGH (ref 10.3–14.5)
RBC BLD AUTO: ABNORMAL
SODIUM SERPL-SCNC: 142 MMOL/L — SIGNIFICANT CHANGE UP (ref 135–145)
WBC # BLD: 6 K/UL — SIGNIFICANT CHANGE UP (ref 3.8–10.5)
WBC # FLD AUTO: 6 K/UL — SIGNIFICANT CHANGE UP (ref 3.8–10.5)

## 2017-04-06 PROCEDURE — 71045 X-RAY EXAM CHEST 1 VIEW: CPT

## 2017-04-06 PROCEDURE — 84100 ASSAY OF PHOSPHORUS: CPT

## 2017-04-06 PROCEDURE — 99284 EMERGENCY DEPT VISIT MOD MDM: CPT | Mod: 25

## 2017-04-06 PROCEDURE — 83735 ASSAY OF MAGNESIUM: CPT

## 2017-04-06 PROCEDURE — 71010: CPT | Mod: 26

## 2017-04-06 PROCEDURE — 80053 COMPREHEN METABOLIC PANEL: CPT

## 2017-04-06 PROCEDURE — 70450 CT HEAD/BRAIN W/O DYE: CPT | Mod: 26

## 2017-04-06 PROCEDURE — 70450 CT HEAD/BRAIN W/O DYE: CPT

## 2017-04-06 PROCEDURE — 99284 EMERGENCY DEPT VISIT MOD MDM: CPT

## 2017-04-06 PROCEDURE — 85027 COMPLETE CBC AUTOMATED: CPT

## 2017-04-06 NOTE — ED PROVIDER NOTE - MEDICAL DECISION MAKING DETAILS
56 year old female patient s/p craniotomy for brain mets from breast with some intermittent headache the past few days with mild swelling at surgical and additional new numbness of the bilateral upper extremities and tongue. Will get CT head, labs, neurosurgery consult. Reassess.

## 2017-04-06 NOTE — ED ADULT NURSE NOTE - OBJECTIVE STATEMENT
pt 55y/o female s/p brain tumor removal 3/15 hx breast cancer with mets presents to er with small swelling to surg site right posterior scalp staples were removed 1 week ago and feeling numbness to tips of fingers and tongue toes bilateraly skin warm dry pt states she called neurosurg but got no return call presents alert oriented with family at bedside surg site clean no drainage small soft swelling to right colten of surgical site no redness no warmth

## 2017-04-06 NOTE — ED ADULT NURSE REASSESSMENT NOTE - NS ED NURSE REASSESS COMMENT FT1
pt ate lunch tray no distress pt pending neurosurg to see pt family at bedside pending disposition
pt seen by neurology pt d/c to follow up with appt outside gamma knife scheduled for Monday pt instructed to return for any worsening symptoms or concerns ambulatory out of er with  and family

## 2017-04-06 NOTE — ED PROVIDER NOTE - PROGRESS NOTE DETAILS
Neurosurgery evaluated patient and cleared patient. Electrolytes wnl. Patient has gamma knife appointment on Monday and advised to keep appointment.

## 2017-04-06 NOTE — ED PROVIDER NOTE - OBJECTIVE STATEMENT
56 year old female patient with pmhx of brain mets from breast cancer s/p craniotomy presents to the ED c/o intermittent headache for a few days with numbness to bilateral fingertips and tongue, and swelling to surgical site of craniotomy. Her toes are also more numb than her baseline. At baseline her toes are numb from neuropathy. Last chemo treatment was a month ago. 56 year old female patient with pmhx of brain mets from breast cancer s/p craniotomy 3/15 presents to the ED c/o intermittent headache for a few days with tingling to bilateral fingertips and tongue, and swelling to surgical site of craniotomy. Her toes are also tingling but patient suffers from neuropathy and states that is her baseline. Patient reports occasional tingling in b/l fingertips when receiving chemo but has not had chemo in a while. headache is 2/10 at this time. Some tingling on tongue as well. No difficulty speaking or moving any extremities.  Last chemo treatment was a month ago.

## 2017-04-06 NOTE — ED PROVIDER NOTE - NS ED MD SCRIBE ATTENDING SCRIBE SECTIONS
DISPOSITION/PHYSICAL EXAM/HISTORY OF PRESENT ILLNESS/HIV/VITAL SIGNS( Pullset)/REVIEW OF SYSTEMS/PAST MEDICAL/SURGICAL/SOCIAL HISTORY

## 2017-04-09 ENCOUNTER — FORM ENCOUNTER (OUTPATIENT)
Age: 57
End: 2017-04-09

## 2017-04-10 ENCOUNTER — OUTPATIENT (OUTPATIENT)
Dept: OUTPATIENT SERVICES | Facility: HOSPITAL | Age: 57
LOS: 1 days | End: 2017-04-10
Payer: COMMERCIAL

## 2017-04-10 ENCOUNTER — APPOINTMENT (OUTPATIENT)
Dept: MRI IMAGING | Facility: IMAGING CENTER | Age: 57
End: 2017-04-10

## 2017-04-10 DIAGNOSIS — Z90.13 ACQUIRED ABSENCE OF BILATERAL BREASTS AND NIPPLES: Chronic | ICD-10-CM

## 2017-04-10 DIAGNOSIS — Z98.890 OTHER SPECIFIED POSTPROCEDURAL STATES: Chronic | ICD-10-CM

## 2017-04-10 DIAGNOSIS — C79.31 SECONDARY MALIGNANT NEOPLASM OF BRAIN: ICD-10-CM

## 2017-04-10 PROCEDURE — 77334 RADIATION TREATMENT AID(S): CPT | Mod: 26

## 2017-04-10 PROCEDURE — 77295 3-D RADIOTHERAPY PLAN: CPT | Mod: 26

## 2017-04-10 PROCEDURE — 77300 RADIATION THERAPY DOSE PLAN: CPT | Mod: 26

## 2017-04-10 PROCEDURE — 77432 STEREOTACTIC RADIATION TRMT: CPT

## 2017-04-10 PROCEDURE — 61797 SRS CRAN LES SIMPLE ADDL: CPT | Mod: 78

## 2017-04-10 PROCEDURE — 76498 UNLISTED MR PROCEDURE: CPT

## 2017-04-10 PROCEDURE — 61800 APPLY SRS HEADFRAME ADD-ON: CPT | Mod: 78

## 2017-04-10 PROCEDURE — A9585: CPT

## 2017-04-10 PROCEDURE — 61796 SRS CRANIAL LESION SIMPLE: CPT | Mod: 78

## 2017-04-24 DIAGNOSIS — C79.31 SECONDARY MALIGNANT NEOPLASM OF BRAIN: ICD-10-CM

## 2017-05-19 ENCOUNTER — OUTPATIENT (OUTPATIENT)
Dept: OUTPATIENT SERVICES | Facility: HOSPITAL | Age: 57
LOS: 1 days | Discharge: HOME | End: 2017-05-19
Payer: MEDICAID

## 2017-05-19 DIAGNOSIS — Z90.13 ACQUIRED ABSENCE OF BILATERAL BREASTS AND NIPPLES: Chronic | ICD-10-CM

## 2017-05-19 DIAGNOSIS — Z98.890 OTHER SPECIFIED POSTPROCEDURAL STATES: Chronic | ICD-10-CM

## 2017-05-23 ENCOUNTER — APPOINTMENT (OUTPATIENT)
Dept: NEUROSURGERY | Facility: CLINIC | Age: 57
End: 2017-05-23

## 2017-05-23 VITALS
DIASTOLIC BLOOD PRESSURE: 77 MMHG | HEIGHT: 60 IN | SYSTOLIC BLOOD PRESSURE: 117 MMHG | HEART RATE: 92 BPM | BODY MASS INDEX: 25.32 KG/M2 | WEIGHT: 129 LBS

## 2017-05-24 RX ORDER — NAPROXEN 500 MG/1
500 TABLET, DELAYED RELEASE ORAL
Qty: 42 | Refills: 0 | Status: COMPLETED | COMMUNITY
Start: 2017-02-15

## 2017-05-24 RX ORDER — LACTULOSE 10 G/15ML
10 SOLUTION ORAL
Qty: 900 | Refills: 0 | Status: COMPLETED | COMMUNITY
Start: 2017-01-11

## 2017-05-24 RX ORDER — GABAPENTIN 300 MG/1
300 CAPSULE ORAL
Qty: 90 | Refills: 0 | Status: COMPLETED | COMMUNITY
Start: 2017-02-16

## 2017-05-24 RX ORDER — ALBUTEROL SULFATE 90 UG/1
108 (90 BASE) AEROSOL, METERED RESPIRATORY (INHALATION)
Qty: 18 | Refills: 0 | Status: COMPLETED | COMMUNITY
Start: 2017-02-10

## 2017-05-24 RX ORDER — OXYCODONE 10 MG/1
10 TABLET ORAL
Qty: 60 | Refills: 0 | Status: COMPLETED | COMMUNITY
Start: 2017-01-02

## 2017-05-24 RX ORDER — OXYCODONE 5 MG/1
5 TABLET ORAL
Qty: 30 | Refills: 0 | Status: COMPLETED | COMMUNITY
Start: 2017-03-22

## 2017-05-24 RX ORDER — DEXAMETHASONE 1 MG/1
1 TABLET ORAL
Qty: 60 | Refills: 0 | Status: COMPLETED | COMMUNITY
Start: 2017-03-22

## 2017-06-11 ENCOUNTER — FORM ENCOUNTER (OUTPATIENT)
Age: 57
End: 2017-06-11

## 2017-06-12 ENCOUNTER — APPOINTMENT (OUTPATIENT)
Dept: MRI IMAGING | Facility: IMAGING CENTER | Age: 57
End: 2017-06-12

## 2017-06-12 ENCOUNTER — APPOINTMENT (OUTPATIENT)
Dept: RADIATION ONCOLOGY | Facility: CLINIC | Age: 57
End: 2017-06-12

## 2017-06-12 ENCOUNTER — OUTPATIENT (OUTPATIENT)
Dept: OUTPATIENT SERVICES | Facility: HOSPITAL | Age: 57
LOS: 1 days | End: 2017-06-12
Payer: MEDICAID

## 2017-06-12 DIAGNOSIS — C79.31 SECONDARY MALIGNANT NEOPLASM OF BRAIN: ICD-10-CM

## 2017-06-12 DIAGNOSIS — Z90.13 ACQUIRED ABSENCE OF BILATERAL BREASTS AND NIPPLES: Chronic | ICD-10-CM

## 2017-06-12 DIAGNOSIS — Z98.890 OTHER SPECIFIED POSTPROCEDURAL STATES: Chronic | ICD-10-CM

## 2017-06-12 PROCEDURE — 61800 APPLY SRS HEADFRAME ADD-ON: CPT | Mod: 78

## 2017-06-12 PROCEDURE — A9585: CPT

## 2017-06-12 PROCEDURE — 77263 THER RADIOLOGY TX PLNG CPLX: CPT

## 2017-06-12 PROCEDURE — 82565 ASSAY OF CREATININE: CPT

## 2017-06-12 PROCEDURE — 61796 SRS CRANIAL LESION SIMPLE: CPT | Mod: 78

## 2017-06-12 PROCEDURE — 77432 STEREOTACTIC RADIATION TRMT: CPT

## 2017-06-12 PROCEDURE — 77300 RADIATION THERAPY DOSE PLAN: CPT | Mod: 26

## 2017-06-12 PROCEDURE — 77295 3-D RADIOTHERAPY PLAN: CPT | Mod: 26

## 2017-06-12 PROCEDURE — 61797 SRS CRAN LES SIMPLE ADDL: CPT | Mod: 78

## 2017-06-12 PROCEDURE — 77334 RADIATION TREATMENT AID(S): CPT | Mod: 26

## 2017-06-12 PROCEDURE — 76498 UNLISTED MR PROCEDURE: CPT

## 2017-07-01 ENCOUNTER — EMERGENCY (EMERGENCY)
Facility: HOSPITAL | Age: 57
LOS: 1 days | Discharge: HOME | End: 2017-07-01
Attending: EMERGENCY MEDICINE | Admitting: EMERGENCY MEDICINE
Payer: MEDICAID

## 2017-07-01 VITALS
TEMPERATURE: 99 F | SYSTOLIC BLOOD PRESSURE: 140 MMHG | OXYGEN SATURATION: 99 % | RESPIRATION RATE: 18 BRPM | DIASTOLIC BLOOD PRESSURE: 84 MMHG | HEIGHT: 61 IN | WEIGHT: 147.93 LBS | HEART RATE: 77 BPM

## 2017-07-01 VITALS
DIASTOLIC BLOOD PRESSURE: 71 MMHG | OXYGEN SATURATION: 100 % | HEART RATE: 71 BPM | RESPIRATION RATE: 18 BRPM | SYSTOLIC BLOOD PRESSURE: 103 MMHG

## 2017-07-01 DIAGNOSIS — Z98.890 OTHER SPECIFIED POSTPROCEDURAL STATES: Chronic | ICD-10-CM

## 2017-07-01 DIAGNOSIS — Z90.13 ACQUIRED ABSENCE OF BILATERAL BREASTS AND NIPPLES: Chronic | ICD-10-CM

## 2017-07-01 LAB
ANION GAP SERPL CALC-SCNC: 14 MMOL/L — SIGNIFICANT CHANGE UP (ref 5–17)
APTT BLD: 30.4 SEC — SIGNIFICANT CHANGE UP (ref 27.5–37.4)
BASOPHILS # BLD AUTO: 0.1 K/UL — SIGNIFICANT CHANGE UP (ref 0–0.2)
BASOPHILS NFR BLD AUTO: 1.1 % — SIGNIFICANT CHANGE UP (ref 0–2)
BUN SERPL-MCNC: 9 MG/DL — SIGNIFICANT CHANGE UP (ref 7–23)
CALCIUM SERPL-MCNC: 10.4 MG/DL — SIGNIFICANT CHANGE UP (ref 8.4–10.5)
CHLORIDE SERPL-SCNC: 102 MMOL/L — SIGNIFICANT CHANGE UP (ref 96–108)
CO2 SERPL-SCNC: 27 MMOL/L — SIGNIFICANT CHANGE UP (ref 22–31)
CREAT SERPL-MCNC: 0.73 MG/DL — SIGNIFICANT CHANGE UP (ref 0.5–1.3)
EOSINOPHIL # BLD AUTO: 0.1 K/UL — SIGNIFICANT CHANGE UP (ref 0–0.5)
EOSINOPHIL NFR BLD AUTO: 1.9 % — SIGNIFICANT CHANGE UP (ref 0–6)
GLUCOSE SERPL-MCNC: 100 MG/DL — HIGH (ref 70–99)
HCT VFR BLD CALC: 38.6 % — SIGNIFICANT CHANGE UP (ref 34.5–45)
HGB BLD-MCNC: 12.5 G/DL — SIGNIFICANT CHANGE UP (ref 11.5–15.5)
INR BLD: 0.98 RATIO — SIGNIFICANT CHANGE UP (ref 0.88–1.16)
LYMPHOCYTES # BLD AUTO: 2.1 K/UL — SIGNIFICANT CHANGE UP (ref 1–3.3)
LYMPHOCYTES # BLD AUTO: 31.3 % — SIGNIFICANT CHANGE UP (ref 13–44)
MCHC RBC-ENTMCNC: 32.2 PG — SIGNIFICANT CHANGE UP (ref 27–34)
MCHC RBC-ENTMCNC: 32.5 GM/DL — SIGNIFICANT CHANGE UP (ref 32–36)
MCV RBC AUTO: 99.2 FL — SIGNIFICANT CHANGE UP (ref 80–100)
MONOCYTES # BLD AUTO: 0.3 K/UL — SIGNIFICANT CHANGE UP (ref 0–0.9)
MONOCYTES NFR BLD AUTO: 4.6 % — SIGNIFICANT CHANGE UP (ref 2–14)
NEUTROPHILS # BLD AUTO: 4 K/UL — SIGNIFICANT CHANGE UP (ref 1.8–7.4)
NEUTROPHILS NFR BLD AUTO: 61.1 % — SIGNIFICANT CHANGE UP (ref 43–77)
PLATELET # BLD AUTO: 229 K/UL — SIGNIFICANT CHANGE UP (ref 150–400)
POTASSIUM SERPL-MCNC: 4.4 MMOL/L — SIGNIFICANT CHANGE UP (ref 3.5–5.3)
POTASSIUM SERPL-SCNC: 4.4 MMOL/L — SIGNIFICANT CHANGE UP (ref 3.5–5.3)
PROTHROM AB SERPL-ACNC: 10.7 SEC — SIGNIFICANT CHANGE UP (ref 9.8–12.7)
RBC # BLD: 3.89 M/UL — SIGNIFICANT CHANGE UP (ref 3.8–5.2)
RBC # FLD: 13.8 % — SIGNIFICANT CHANGE UP (ref 10.3–14.5)
SODIUM SERPL-SCNC: 143 MMOL/L — SIGNIFICANT CHANGE UP (ref 135–145)
WBC # BLD: 6.6 K/UL — SIGNIFICANT CHANGE UP (ref 3.8–10.5)
WBC # FLD AUTO: 6.6 K/UL — SIGNIFICANT CHANGE UP (ref 3.8–10.5)

## 2017-07-01 PROCEDURE — 85027 COMPLETE CBC AUTOMATED: CPT

## 2017-07-01 PROCEDURE — 80048 BASIC METABOLIC PNL TOTAL CA: CPT

## 2017-07-01 PROCEDURE — 96375 TX/PRO/DX INJ NEW DRUG ADDON: CPT

## 2017-07-01 PROCEDURE — 96374 THER/PROPH/DIAG INJ IV PUSH: CPT

## 2017-07-01 PROCEDURE — 85730 THROMBOPLASTIN TIME PARTIAL: CPT

## 2017-07-01 PROCEDURE — 70450 CT HEAD/BRAIN W/O DYE: CPT | Mod: 26

## 2017-07-01 PROCEDURE — 70450 CT HEAD/BRAIN W/O DYE: CPT

## 2017-07-01 PROCEDURE — 99284 EMERGENCY DEPT VISIT MOD MDM: CPT | Mod: 25

## 2017-07-01 PROCEDURE — 99285 EMERGENCY DEPT VISIT HI MDM: CPT

## 2017-07-01 PROCEDURE — 85610 PROTHROMBIN TIME: CPT

## 2017-07-01 RX ORDER — MORPHINE SULFATE 50 MG/1
4 CAPSULE, EXTENDED RELEASE ORAL ONCE
Qty: 0 | Refills: 0 | Status: DISCONTINUED | OUTPATIENT
Start: 2017-07-01 | End: 2017-07-01

## 2017-07-01 RX ORDER — ONDANSETRON 8 MG/1
4 TABLET, FILM COATED ORAL ONCE
Qty: 0 | Refills: 0 | Status: COMPLETED | OUTPATIENT
Start: 2017-07-01 | End: 2017-07-01

## 2017-07-01 RX ORDER — MORPHINE SULFATE 50 MG/1
1 CAPSULE, EXTENDED RELEASE ORAL
Qty: 20 | Refills: 0 | OUTPATIENT
Start: 2017-07-01 | End: 2017-07-06

## 2017-07-01 RX ORDER — OXYCODONE HYDROCHLORIDE 5 MG/1
1 TABLET ORAL
Qty: 28 | Refills: 0 | OUTPATIENT
Start: 2017-07-01 | End: 2017-07-08

## 2017-07-01 RX ADMIN — MORPHINE SULFATE 4 MILLIGRAM(S): 50 CAPSULE, EXTENDED RELEASE ORAL at 15:26

## 2017-07-01 RX ADMIN — ONDANSETRON 4 MILLIGRAM(S): 8 TABLET, FILM COATED ORAL at 15:36

## 2017-07-01 NOTE — CONSULT NOTE ADULT - SUBJECTIVE AND OBJECTIVE BOX
57 F w/ metastatic breast CA s/p craniotomy in 2015 and gamma knife recently, with MRI in June showing increased R parietal mass.  Now complaining of flashing white lights in perirphery of both eyes since Wednesday, no floaters or loss of vision, has progressed to be colored "elizabeth lights in side of vision.   Had similar episode which resolved after the surgery.  Since surgery has noticed small white spots of missing vision inferirly. No pain in eyes, no recent trauma.      PMH: Breast CA  Meds: decadron, keppra, roxcodone, gabapentin, Xeloda   POH: no trauma, surgery or laser   NKDA     VA 20/20 oui   P R&R ou no APD  T 21/18  EOM full   CVF full    Color 12/12 ou   Amsler grid - white spots of missing grid inferiorly ou     PLE:   LLA flat ou   CS W&Q ou   K clear ou   AC D&F ou   I flat ou   L NS ou     DFE @ 6pm w/ T &P   OD: ON S&P, MVP flat   OS: ON S&P, MVP flat     CT head:   Right parieto-occipital postsurgical changes within were displacement of   the craniotomy flap, new compared with the prior CT of 4/6/2017 but   unchanged compared with the most recent MRI of 6/12/2017.  No evidence of acute hemorrhage or infarct. No new metastatic foci are   identified. Brain MRI follow-up is recommended as a more sensitive   examination for the detection of intracranial metastatic disease.

## 2017-07-01 NOTE — ED PROVIDER NOTE - ATTENDING CONTRIBUTION TO CARE
56yo F with metastatic breast cancer s/p craniotomy (3/15) and last gamma knife (6/12) presents with ha now not relieved by apap and intermittent vision changes.  No focal deficits, fevers.  NAD, completely nontoxic, afebrile, no nuchal rigidity, NCAT, R parietal swelling baseline, PERRL R pupil 3mm/L pupil 4mm, CN grossly intact, S1S2, CTAB, abd soft nontender with no rebound/guarding/masses, no LE edema.  IV pain control, CT head, nsy consult.

## 2017-07-01 NOTE — ED PROVIDER NOTE - CARE PLAN
Principal Discharge DX:	Chronic nonintractable headache, unspecified headache type Principal Discharge DX:	Chronic nonintractable headache, unspecified headache type  Instructions for follow-up, activity and diet:	1. Return to ED for worsening, progressive or any other concerning symptoms   2. Follow up with your primary care doctor in 2-3days   3. Follow up with Dr. Estes and ophthalmology as directed Principal Discharge DX:	Chronic nonintractable headache, unspecified headache type  Instructions for follow-up, activity and diet:	1. Return to ED for worsening, progressive or any other concerning symptoms   2. Follow up with your primary care doctor in 2-3days   3. Follow up with Dr. Estes and ophthalmology as directed  4. Take Tylenol up to 650 mg every 6 hours as needed for pain.   5. Take oxycodone 5 mg every 6 hours as needed for severe pain; do not drink alcohol while taking this medication.

## 2017-07-01 NOTE — ED PROVIDER NOTE - SKIN, MLM
Skin normal color for race, warm, dry and intact. No evidence of rash.  R craniotomy site healed with no erythema or drainage.  Baseline R parietal swelling.

## 2017-07-01 NOTE — ED PROVIDER NOTE - PLAN OF CARE
1. Return to ED for worsening, progressive or any other concerning symptoms   2. Follow up with your primary care doctor in 2-3days   3. Follow up with Dr. Etses and ophthalmology as directed 1. Return to ED for worsening, progressive or any other concerning symptoms   2. Follow up with your primary care doctor in 2-3days   3. Follow up with Dr. Estes and ophthalmology as directed  4. Take Tylenol up to 650 mg every 6 hours as needed for pain.   5. Take oxycodone 5 mg every 6 hours as needed for severe pain; do not drink alcohol while taking this medication.

## 2017-07-01 NOTE — ED ADULT NURSE NOTE - OBJECTIVE STATEMENT
56 y/o female hx of gamma knife and craniotomy sx for aneyursms came in c/o headache s/p gamma knife sx. pt states the sx was 6/12 and she developed a headache around the rt eye and rt side of head. pt states she has swelling surgical site. site is dry clean and intact with slight lump above it. l pupil 4mm reactive rt pupil 3mm reactive. pt states slight blurry vision. pt unresolved headache that is constant. she wakes up with it and goes to bed with it. no n/v/d. no chest pain no sob. vs stable

## 2017-07-01 NOTE — CONSULT NOTE ADULT - ASSESSMENT
A/P   57 F w/ metastatic breast CA s/p craniotomy and gamma knife with flashes of lights   - good vision, no APD, color full, CVF full, missing spots on inferior Amsler grid bilaterally   - normal dilated exam - no retinal holes or tears or detachments   - optic nerve sharp and pink  - recommend outpatient formal automated visual field testing   - folowup with ophthalmology - Cesilia Odom, [id number] [facility] [id number] within 1-2 weeks of discharge; [phone]; Otherwise call eye clinic at [phone] for appointment

## 2017-07-01 NOTE — CONSULT NOTE ADULT - ASSESSMENT
case d/w Dr. Estes  -will arrange close outpatient follow up; no surgical intervention @ this moment  -pain control w/ oral morphine. pain did not respond to oxycodone.   -ok to resume chemotherapy Monday per d/w Dr. Estes

## 2017-07-01 NOTE — CONSULT NOTE ADULT - SUBJECTIVE AND OBJECTIVE BOX
HPI:  57F w/ metastatic breast cancer s/p craniotomy for right parietal met 3/15/2017 and subsequent Gamma Knife radiation x2. Patient states that the bone flap from her craniotomy popped during her last Gamma Knife procedure 6/12 and she has intermittently had 6/10 headaches since. Today's headache was worse and she had an episode of seeing "sparkles" in her peripheral vision. She had a similar visual episode prior to surgery as well.    CTH in ED shows depressed bone flap. Upon review of Gamma Knife film w/ Dr. Estes it is stable since 6/12.  Ophtho eval in ED shows no retinal detachment.    Pain well controlled w morphine.    PAST MEDICAL & SURGICAL HISTORY:  Breast cancer  Breast cancer  H/O brain surgery  H/O bilateral mastectomy  H/O bilateral mastectomy      REVIEW OF SYSTEMS      General: headaches	    Skin/Breast:  	  Ophthalmologic:  	  ENMT:	    Respiratory and Thorax:  	  Cardiovascular:	    Gastrointestinal:	    Genitourinary:	    Musculoskeletal:	    Neurological:	    Psychiatric:	    Hematology/Lymphatics:	    Endocrine:	    Allergic/Immunologic:	    MEDICATIONS  (STANDING):    MEDICATIONS  (PRN):      Allergies    No Known Allergies    Intolerances        SOCIAL HISTORY:    FAMILY HISTORY:  No pertinent family history in first degree relatives      Vital Signs Last 24 Hrs  T(C): 37 (01 Jul 2017 14:40), Max: 37 (01 Jul 2017 14:40)  T(F): 98.6 (01 Jul 2017 14:40), Max: 98.6 (01 Jul 2017 14:40)  HR: 71 (01 Jul 2017 17:08) (71 - 77)  BP: 103/71 (01 Jul 2017 17:08) (103/71 - 140/84)  BP(mean): --  RR: 18 (01 Jul 2017 17:08) (18 - 18)  SpO2: 100% (01 Jul 2017 17:08) (99% - 100%)    PHYSICAL EXAM:    Constitutional: resting comfortably in bed    Eyes: PERRL EOMI     HEENMT: + palpable skull defect R parietal region that is TTP    Neck:    Breasts:    Back:    Respiratory:    Cardiovascular:    Gastrointestinal:    Genitourinary:    Rectal:    Extremities:    Vascular:    Neurological:  AAOx3  FC  PERRL EOMI FACE SYMMETRIC  5/5 strength throughout EXCEPT contracted L hand (baseline)   sensation intact to light touch throughout     Skin:    Lymph Nodes:    Musculoskeletal:    Psychiatric:        LABS:                        12.5   6.6   )-----------( 229      ( 01 Jul 2017 15:21 )             38.6     07-01    143  |  102  |  9   ----------------------------<  100<H>  4.4   |  27  |  0.73    Ca    10.4      01 Jul 2017 15:21      PT/INR - ( 01 Jul 2017 15:21 )   PT: 10.7 sec;   INR: 0.98 ratio         PTT - ( 01 Jul 2017 15:21 )  PTT:30.4 sec      RADIOLOGY & ADDITIONAL STUDIES:

## 2017-07-01 NOTE — ED ADULT TRIAGE NOTE - CHIEF COMPLAINT QUOTE
Pt s/p craniotomy and gamma knife treatment, now c/o pain and swelling surgical site and also blurred vision since Thursday

## 2017-07-01 NOTE — ED PROVIDER NOTE - PROGRESS NOTE DETAILS
spoke with radiology, there is migration of the craniotomy flap, NSx at bedside and awayre- will evaluate images and requesting ophtho consult for retinal detachment -Darrick DO NSx clear for discharge, will Follow up outpatient -Darrick ALLEN oxy doesn't work for patient, will Rx morphine per recommendation of Liana Bower DO

## 2017-07-01 NOTE — ED PROVIDER NOTE - MEDICAL DECISION MAKING DETAILS
HA s/p gamma knife, MRI with larger parietal mass. no acute neuro deficits. no infectious sx. could be from radiation, will CT r/o ICH

## 2017-07-01 NOTE — ED PROVIDER NOTE - OBJECTIVE STATEMENT
58yo F with a worsening R sided headache last few days.  She is s/p craniotomy 3/15 and s/p gamma knife 4/10 and 6/12.  Last MRI June which showed an increase in the R parietal mass.  She denies any fevers, n/v, focal deficits.  She does have intermittent flashing lights and blurry vision B eyes.  She has also been more drowsy recently.  Apap does not relieve pain anymore.    NSY- Franklyn.

## 2017-07-03 ENCOUNTER — APPOINTMENT (OUTPATIENT)
Dept: NEUROSURGERY | Facility: CLINIC | Age: 57
End: 2017-07-03

## 2017-07-03 VITALS
DIASTOLIC BLOOD PRESSURE: 78 MMHG | BODY MASS INDEX: 29.06 KG/M2 | WEIGHT: 148 LBS | SYSTOLIC BLOOD PRESSURE: 135 MMHG | HEIGHT: 60 IN | HEART RATE: 95 BPM

## 2017-07-03 DIAGNOSIS — Q75.9 CONGENITAL MALFORMATION OF SKULL AND FACE BONES, UNSPECIFIED: ICD-10-CM

## 2017-07-05 PROBLEM — Q75.9: Status: ACTIVE | Noted: 2017-07-05

## 2017-07-10 ENCOUNTER — OUTPATIENT (OUTPATIENT)
Dept: OUTPATIENT SERVICES | Facility: HOSPITAL | Age: 57
LOS: 1 days | End: 2017-07-10
Payer: MEDICAID

## 2017-07-10 VITALS
HEART RATE: 67 BPM | TEMPERATURE: 98 F | DIASTOLIC BLOOD PRESSURE: 68 MMHG | WEIGHT: 147.93 LBS | RESPIRATION RATE: 16 BRPM | SYSTOLIC BLOOD PRESSURE: 112 MMHG | HEIGHT: 62 IN

## 2017-07-10 DIAGNOSIS — Z90.13 ACQUIRED ABSENCE OF BILATERAL BREASTS AND NIPPLES: Chronic | ICD-10-CM

## 2017-07-10 DIAGNOSIS — M95.2 OTHER ACQUIRED DEFORMITY OF HEAD: ICD-10-CM

## 2017-07-10 DIAGNOSIS — Z98.890 OTHER SPECIFIED POSTPROCEDURAL STATES: Chronic | ICD-10-CM

## 2017-07-10 DIAGNOSIS — C50.919 MALIGNANT NEOPLASM OF UNSPECIFIED SITE OF UNSPECIFIED FEMALE BREAST: ICD-10-CM

## 2017-07-10 LAB
ALBUMIN SERPL ELPH-MCNC: 4.4 G/DL — SIGNIFICANT CHANGE UP (ref 3.3–5)
ALP SERPL-CCNC: 61 U/L — SIGNIFICANT CHANGE UP (ref 40–120)
ALT FLD-CCNC: 14 U/L — SIGNIFICANT CHANGE UP (ref 4–33)
APTT BLD: 30.3 SEC — SIGNIFICANT CHANGE UP (ref 27.5–37.4)
AST SERPL-CCNC: 18 U/L — SIGNIFICANT CHANGE UP (ref 4–32)
BILIRUB SERPL-MCNC: 0.3 MG/DL — SIGNIFICANT CHANGE UP (ref 0.2–1.2)
BLD GP AB SCN SERPL QL: NEGATIVE — SIGNIFICANT CHANGE UP
BUN SERPL-MCNC: 11 MG/DL — SIGNIFICANT CHANGE UP (ref 7–23)
CALCIUM SERPL-MCNC: 10.7 MG/DL — HIGH (ref 8.4–10.5)
CHLORIDE SERPL-SCNC: 98 MMOL/L — SIGNIFICANT CHANGE UP (ref 98–107)
CO2 SERPL-SCNC: 27 MMOL/L — SIGNIFICANT CHANGE UP (ref 22–31)
CREAT SERPL-MCNC: 0.7 MG/DL — SIGNIFICANT CHANGE UP (ref 0.5–1.3)
GLUCOSE SERPL-MCNC: 110 MG/DL — HIGH (ref 70–99)
HCT VFR BLD CALC: 35.5 % — SIGNIFICANT CHANGE UP (ref 34.5–45)
HGB BLD-MCNC: 11.3 G/DL — LOW (ref 11.5–15.5)
INR BLD: 0.87 — LOW (ref 0.88–1.17)
MCHC RBC-ENTMCNC: 30.7 PG — SIGNIFICANT CHANGE UP (ref 27–34)
MCHC RBC-ENTMCNC: 31.8 % — LOW (ref 32–36)
MCV RBC AUTO: 96.5 FL — SIGNIFICANT CHANGE UP (ref 80–100)
NRBC # FLD: 0 — SIGNIFICANT CHANGE UP
PLATELET # BLD AUTO: 140 K/UL — LOW (ref 150–400)
PMV BLD: 9.4 FL — SIGNIFICANT CHANGE UP (ref 7–13)
POTASSIUM SERPL-MCNC: 4.3 MMOL/L — SIGNIFICANT CHANGE UP (ref 3.5–5.3)
POTASSIUM SERPL-SCNC: 4.3 MMOL/L — SIGNIFICANT CHANGE UP (ref 3.5–5.3)
PROT SERPL-MCNC: 7.5 G/DL — SIGNIFICANT CHANGE UP (ref 6–8.3)
PROTHROM AB SERPL-ACNC: 9.7 SEC — LOW (ref 9.8–13.1)
RBC # BLD: 3.68 M/UL — LOW (ref 3.8–5.2)
RBC # FLD: 14.1 % — SIGNIFICANT CHANGE UP (ref 10.3–14.5)
RH IG SCN BLD-IMP: POSITIVE — SIGNIFICANT CHANGE UP
SODIUM SERPL-SCNC: 140 MMOL/L — SIGNIFICANT CHANGE UP (ref 135–145)
WBC # BLD: 3.44 K/UL — LOW (ref 3.8–10.5)
WBC # FLD AUTO: 3.44 K/UL — LOW (ref 3.8–10.5)

## 2017-07-10 PROCEDURE — 93010 ELECTROCARDIOGRAM REPORT: CPT

## 2017-07-10 RX ORDER — GABAPENTIN 400 MG/1
3 CAPSULE ORAL
Qty: 0 | Refills: 0 | COMMUNITY

## 2017-07-10 RX ORDER — CAPECITABINE 500 MG/1
0 TABLET ORAL
Qty: 0 | Refills: 0 | COMMUNITY

## 2017-07-10 NOTE — H&P PST ADULT - NEGATIVE MUSCULOSKELETAL SYMPTOMS
no myalgia/no neck pain/no arthralgia/no stiffness/no arthritis/no joint swelling/no arm pain R/no muscle cramps

## 2017-07-10 NOTE — H&P PST ADULT - RS GEN PE MLT RESP DETAILS PC
no rhonchi/no intercostal retractions/good air movement/no subcutaneous emphysema/breath sounds equal/no rales/respirations non-labored/no chest wall tenderness/clear to auscultation bilaterally

## 2017-07-10 NOTE — H&P PST ADULT - VENOUS THROMBOEMBOLISM CURRENT STATUS
major surgery lasting over 3 hrs/elective major lower extremity arthroplasty present cancer or chemotherapy/elective major lower extremity arthroplasty/major surgery lasting over 3 hrs

## 2017-07-10 NOTE — H&P PST ADULT - NEGATIVE CARDIOVASCULAR SYMPTOMS
no palpitations/no peripheral edema/no chest pain/no dyspnea on exertion/no paroxysmal nocturnal dyspnea/no orthopnea/no claudication

## 2017-07-10 NOTE — H&P PST ADULT - MUSCULOSKELETAL
details… detailed exam no joint erythema/ROM intact/normal strength/no calf tenderness/no joint warmth/no joint swelling ROM intact/no joint swelling/no joint erythema/no joint warmth/no calf tenderness

## 2017-07-10 NOTE — H&P PST ADULT - PMH
Brachial neuritis  left  Breast cancer  left, mets to bone, lung, lymph nodes, and brain dx. in 2014  Breast cancer Brachial neuritis  left  Breast cancer  left, mets to bone, lung, lymph nodes dx in 2014  mets to brain dx 2017  Breast cancer

## 2017-07-10 NOTE — H&P PST ADULT - PROBLEM SELECTOR PLAN 1
Pt is scheduled for elevation of craniotomy flap on 7/12/17.  Pre op instructions given and pt able to verbalize understanding.   Case discussed with Dr. Azar, Dr. Azar states that no clearance is necessary, request last oncology note.

## 2017-07-10 NOTE — H&P PST ADULT - NEUROLOGICAL COMMENTS
left arm numbness and tingling left arm numbness and tingling  hx. breast CA mets to brain dx. depressed craniotomy flap

## 2017-07-10 NOTE — H&P PST ADULT - HISTORY OF PRESENT ILLNESS
57F hx metastatic breast ca w/ mets to bone, lung, lymph nodes dx 3/2014 s/p bilateral mastectomy w/ LN dissection, chemotherapy and radiation therapy presents to pre surgical testing.  Pt reports she started to have headaches in March 2017, oncologist recommended brain MRI and brain MRI showed metastatic disease to brain. Pt reports her last chemotherapy treatment was 7/3/17.  Pt is s/p craniotomy 3/15/17.  Pt reports she had gamma knife procedure 3/30/17 and again on June 12, 2017.  Pt reports her last chemotherapy treatment was 7/3/17.  Pt reports when equipment for gamma knife procedure was place on pt's head on June 2017, she heard a loud pop and when Dr. Estes examined her, she was told her craniotomy flap had opened.  Pt reports CT and MRI done.  Pt reports she is experiencing pain and blurred vision.  Pt is scheduled for elevation of craniotomy flap on 7/12/17. 57 female with PMH breast cancer mets to bone, lung, and lymph nodes dx 3/2014 s/p bilateral mastectomy w/ LN dissection, chemotherapy and radiation therapy presents to pre surgical testing.  Pt reports she started to have headaches in March 2017, o brain MRI showed metastatic disease to brain. Pt reports her last chemotherapy treatment was 7/3/17.  Pt is s/p craniotomy 3/15/17.  Pt reports she had gamma knife procedure 3/30/17 and again on June 12, 2017.  Pt reports when equipment for gamma knife procedure was place on pt's head on June 2017, she heard a loud pop and when Dr. Estes examined her, she was told her craniotomy flap had depressed.  Pt reports CT and MRI done.  Pt reports she is experiencing pain and blurred vision.  Pt is scheduled for elevation of craniotomy flap on 7/12/17.

## 2017-07-11 NOTE — ASU PATIENT PROFILE, ADULT - PMH
Brachial neuritis  left  Breast cancer  left, mets to bone, lung, lymph nodes dx in 2014  mets to brain dx 2017  Breast cancer

## 2017-07-12 ENCOUNTER — INPATIENT (INPATIENT)
Facility: HOSPITAL | Age: 57
LOS: 0 days | Discharge: HOME | End: 2017-07-13
Attending: STUDENT IN AN ORGANIZED HEALTH CARE EDUCATION/TRAINING PROGRAM | Admitting: STUDENT IN AN ORGANIZED HEALTH CARE EDUCATION/TRAINING PROGRAM
Payer: MEDICAID

## 2017-07-12 ENCOUNTER — APPOINTMENT (OUTPATIENT)
Dept: NEUROSURGERY | Facility: HOSPITAL | Age: 57
End: 2017-07-12

## 2017-07-12 VITALS
DIASTOLIC BLOOD PRESSURE: 76 MMHG | RESPIRATION RATE: 16 BRPM | OXYGEN SATURATION: 100 % | SYSTOLIC BLOOD PRESSURE: 115 MMHG | TEMPERATURE: 98 F | HEART RATE: 68 BPM | WEIGHT: 147.71 LBS | HEIGHT: 62 IN

## 2017-07-12 DIAGNOSIS — T84.328D: ICD-10-CM

## 2017-07-12 DIAGNOSIS — M95.2 OTHER ACQUIRED DEFORMITY OF HEAD: ICD-10-CM

## 2017-07-12 DIAGNOSIS — Z90.13 ACQUIRED ABSENCE OF BILATERAL BREASTS AND NIPPLES: Chronic | ICD-10-CM

## 2017-07-12 DIAGNOSIS — Z98.890 OTHER SPECIFIED POSTPROCEDURAL STATES: Chronic | ICD-10-CM

## 2017-07-12 LAB
HCG UR QL: NEGATIVE — SIGNIFICANT CHANGE UP
RH IG SCN BLD-IMP: POSITIVE — SIGNIFICANT CHANGE UP

## 2017-07-12 PROCEDURE — 99223 1ST HOSP IP/OBS HIGH 75: CPT | Mod: 25

## 2017-07-12 PROCEDURE — 62140 CRNOP SKULL DEFECT<5 CM DIAM: CPT | Mod: 78

## 2017-07-12 RX ORDER — FENTANYL CITRATE 50 UG/ML
25 INJECTION INTRAVENOUS
Qty: 0 | Refills: 0 | Status: DISCONTINUED | OUTPATIENT
Start: 2017-07-12 | End: 2017-07-13

## 2017-07-12 RX ORDER — CHOLECALCIFEROL (VITAMIN D3) 125 MCG
1000 CAPSULE ORAL DAILY
Qty: 0 | Refills: 0 | Status: DISCONTINUED | OUTPATIENT
Start: 2017-07-12 | End: 2017-07-13

## 2017-07-12 RX ORDER — ACETAMINOPHEN 500 MG
650 TABLET ORAL EVERY 6 HOURS
Qty: 0 | Refills: 0 | Status: DISCONTINUED | OUTPATIENT
Start: 2017-07-12 | End: 2017-07-13

## 2017-07-12 RX ORDER — OXYCODONE AND ACETAMINOPHEN 5; 325 MG/1; MG/1
1 TABLET ORAL EVERY 4 HOURS
Qty: 0 | Refills: 0 | Status: DISCONTINUED | OUTPATIENT
Start: 2017-07-12 | End: 2017-07-13

## 2017-07-12 RX ORDER — CEFAZOLIN SODIUM 1 G
2000 VIAL (EA) INJECTION EVERY 8 HOURS
Qty: 0 | Refills: 0 | Status: COMPLETED | OUTPATIENT
Start: 2017-07-13 | End: 2017-07-13

## 2017-07-12 RX ORDER — HYDROMORPHONE HYDROCHLORIDE 2 MG/ML
0.3 INJECTION INTRAMUSCULAR; INTRAVENOUS; SUBCUTANEOUS
Qty: 0 | Refills: 0 | Status: DISCONTINUED | OUTPATIENT
Start: 2017-07-12 | End: 2017-07-13

## 2017-07-12 RX ORDER — ONDANSETRON 8 MG/1
4 TABLET, FILM COATED ORAL ONCE
Qty: 0 | Refills: 0 | Status: COMPLETED | OUTPATIENT
Start: 2017-07-12 | End: 2017-07-13

## 2017-07-12 RX ORDER — SODIUM CHLORIDE 9 MG/ML
1000 INJECTION, SOLUTION INTRAVENOUS
Qty: 0 | Refills: 0 | Status: DISCONTINUED | OUTPATIENT
Start: 2017-07-12 | End: 2017-07-12

## 2017-07-12 RX ORDER — HYDROMORPHONE HYDROCHLORIDE 2 MG/ML
0.6 INJECTION INTRAMUSCULAR; INTRAVENOUS; SUBCUTANEOUS
Qty: 0 | Refills: 0 | Status: DISCONTINUED | OUTPATIENT
Start: 2017-07-12 | End: 2017-07-13

## 2017-07-12 RX ORDER — DEXTROSE MONOHYDRATE, SODIUM CHLORIDE, AND POTASSIUM CHLORIDE 50; .745; 4.5 G/1000ML; G/1000ML; G/1000ML
1000 INJECTION, SOLUTION INTRAVENOUS
Qty: 0 | Refills: 0 | Status: DISCONTINUED | OUTPATIENT
Start: 2017-07-12 | End: 2017-07-13

## 2017-07-12 RX ORDER — GABAPENTIN 400 MG/1
300 CAPSULE ORAL THREE TIMES A DAY
Qty: 0 | Refills: 0 | Status: DISCONTINUED | OUTPATIENT
Start: 2017-07-12 | End: 2017-07-13

## 2017-07-12 RX ADMIN — GABAPENTIN 300 MILLIGRAM(S): 400 CAPSULE ORAL at 22:22

## 2017-07-12 RX ADMIN — FENTANYL CITRATE 25 MICROGRAM(S): 50 INJECTION INTRAVENOUS at 20:36

## 2017-07-12 RX ADMIN — FENTANYL CITRATE 25 MICROGRAM(S): 50 INJECTION INTRAVENOUS at 22:25

## 2017-07-12 RX ADMIN — FENTANYL CITRATE 25 MICROGRAM(S): 50 INJECTION INTRAVENOUS at 22:39

## 2017-07-12 RX ADMIN — DEXTROSE MONOHYDRATE, SODIUM CHLORIDE, AND POTASSIUM CHLORIDE 75 MILLILITER(S): 50; .745; 4.5 INJECTION, SOLUTION INTRAVENOUS at 21:45

## 2017-07-12 RX ADMIN — FENTANYL CITRATE 25 MICROGRAM(S): 50 INJECTION INTRAVENOUS at 20:25

## 2017-07-12 NOTE — CONSULT NOTE ADULT - SUBJECTIVE AND OBJECTIVE BOX
SICU Consultation Note  =====================================================  Surgery Information  craniectomy, replacement of bone flap for skull defect  Case Duration: 	EBL: 100  IV Fluids: 900  Complications: None    HISTORY  HPI:  57 female with PMH breast cancer mets to bone, lung, and lymph nodes dx 3/2014 s/p bilateral mastectomy w/ LN dissection, chemotherapy and radiation therapy presents to pre surgical testing.  Pt reports she started to have headaches in March 2017, o brain MRI showed metastatic disease to brain. Pt reports her last chemotherapy treatment was 7/3/17.  Pt is s/p craniotomy 3/15/17.  Pt reports she had gamma knife procedure 3/30/17 and again on June 12, 2017.  Pt reports when equipment for gamma knife procedure was place on pt's head on June 2017, she heard a loud pop and when Dr. Estes examined her, she was told her craniotomy flap had depressed.  Pt reports CT and MRI done.  Pt reports she is experiencing pain and blurred vision.  Pt is scheduled for elevation of craniotomy flap on 7/12/17. (10 Jul 2017 08:11)    Today, patient underwent craniotomy without complication.  SICU consulted to assist with PACU management and q1 neurochecks.     Allergies:   PAST MEDICAL & SURGICAL HISTORY:  Brachial neuritis: left  Breast cancer  Breast cancer: left, mets to bone, lung, lymph nodes dx in 2014  mets to brain dx 2017  H/O brain surgery: craniotomy 3/2017  H/O bilateral mastectomy  H/O bilateral mastectomy: 2014    FAMILY HISTORY:  No pertinent family history in first degree relatives    ADVANCE DIRECTIVES: Full code    REVIEW OF SYSTEMS:    General: Non-Contributory  Skin/Breast: Non-Contributory  Ophthalmologic: Non-Contributory  ENMT: Non-Contributory  Respiratory and Thorax: Non-Contributory  Cardiovascular: Non-Contributory  Gastrointestinal: Non-Contributory  Genitourinary: Non-Contributory  Musculoskeletal: Non-Contributory  Neurological: headaches, blurry vision, mild right sided weakness in the upper extremity   Psychiatric: Non-Contributory  Hematology/Lymphatics: Non-Contributory  Endocrine: Non-Contributory  Allergic/Immunologic: Non-Contributory    CURRENT MEDICATIONS:   --------------------------------------------------------------------------------------  Neurologic Medications  acetaminophen   Tablet 650 milliGRAM(s) Oral every 6 hours PRN For Temp greater than 38 C (100.4 F)  acetaminophen   Tablet. 650 milliGRAM(s) Oral every 6 hours PRN Mild Pain (1 - 3)  oxyCODONE    5 mG/acetaminophen 325 mG 1 Tablet(s) Oral every 4 hours PRN Moderate Pain  gabapentin 300 milliGRAM(s) Oral three times a day  fentaNYL    Injectable 25 MICROGram(s) IV Push every 5 minutes PRN Mild Pain (1 - 3)  HYDROmorphone  Injectable 0.3 milliGRAM(s) IV Push every 10 minutes PRN Moderate Pain (4 - 6)  HYDROmorphone  Injectable 0.6 milliGRAM(s) IV Push every 10 minutes PRN Severe Pain (7 - 10)  ondansetron Injectable 4 milliGRAM(s) IV Push once PRN Nausea and/or Vomiting    Respiratory Medications: none  Cardiovascular Medications: none  Gastrointestinal Medications  lactated ringers. 1000 milliLiter(s) IV Continuous <Continuous>  sodium chloride 0.9% with potassium chloride 20 mEq/L 1000 milliLiter(s) IV Continuous <Continuous>  cholecalciferol 1000 Unit(s) Oral daily  Genitourinary Medications: none  Hematologic/Oncologic Medications: none  Antimicrobial/Immunologic Medications: none  Endocrine/Metabolic Medications: none    --------------------------------------------------------------------------------------    VITAL SIGNS, INS/OUTS (last 24 hours):  --------------------------------------------------------------------------------------  T(C): 36.8 (07-12-17 @ 20:05), Max: 36.8 (07-12-17 @ 13:28)  HR: 70 (07-12-17 @ 20:25) (66 - 76)  BP: 135/84 (07-12-17 @ 20:25) (115/76 - 136/84)  RR: 15 (07-12-17 @ 20:25) (14 - 16)  SpO2: 100% (07-12-17 @ 20:25) (100% - 100%)        07-12 @ 07:01  -  07-12 @ 20:35  --------------------------------------------------------  IN:    sodium chloride 0.9% with potassium chloride 20 mEq/L: 150 mL  Total IN: 150 mL    OUT:    Bulb: 15 mL  Total OUT: 15 mL    Total NET: 135 mL        --------------------------------------------------------------------------------------    EXAM  NEUROLOGY  RASS:  0 	GCS:  15  Exam: NAD, awake and alert, 5/5 strength bilateral lower extremities, RUE 4/5, LUE 5/5.  Sensation to light touch intact. Smile symmetric     HEENT  Exam: Right sided SHERI drain serosangous, dressing clean, dry, intact    RESPIRATORY  Exam: Lungs clear to auscultation      CARDIOVASCULAR  Exam: S1, S2.  Regular rate and rhythm.      GI/NUTRITION  soft,NT, ND  Current Diet:  Reg    VASCULAR  Exam: Extremities warm, pink, well-perfused.       SKIN:  Exam: Good skin turgor,    METABOLIC/FLUIDS/ELECTROLYTES  lactated ringers. 1000 milliLiter(s) IV Continuous <Continuous>  sodium chloride 0.9% with potassium chloride 20 mEq/L 1000 milliLiter(s) IV Continuous <Continuous>  cholecalciferol 1000 Unit(s) Oral daily      HEMATOLOGIC  [] DVT Prophylaxis: held for intracranial surgery; venodynes       INFECTIOUS DISEASE  Antimicrobials/Immunologic Medications: None    Tubes/Lines/Drains    [x] Peripheral IV  [] Central Venous Line     	[] R	[] L	[] IJ	[] Fem	[] SC	Date Placed:   [] Arterial Line		[] R	[] L	[] Fem	[] Rad	[] Ax	Date Placed:   [] PICC:         	[] Midline		[] Mediport  [] Urinary Catheter		Date Placed:   SHERI drain- right drain     LABS  --------------------------------------------------------------------------------------  No recent      ASSESSMENT:  57y Female s/p     PLAN: craniectomy, replacement of bone flap for skull defect  Neurologic: q 1 neuro checks, Tylenol and oxycodone for pain   Respiratory: no acute issues   Cardiovascular: no acute issues   Gastrointestinal/Nutrition: Reg diet   Renal/Genitourinary: No acute issues   Hematologic: Hold chemical DVT ppx given intracranial surgery, venodynes   Infectious Disease: no concerns for infection   Tubes/Lines/Drains: SHERI drain   Endocrine: no active issues   Disposition: PACU overnight     --------------------------------------------------------------------------------------    Critical Care Diagnoses:  Skull fracture

## 2017-07-12 NOTE — PROGRESS NOTE ADULT - SUBJECTIVE AND OBJECTIVE BOX
NEUROSURGERY    Post Op Check List    Patient is a 57y old  Female who presents with a chief complaint of "fixing craniotomy flap" (10 Jul 2017 08:11) S/p Cranioplasty for displaced crani flap.    HPI:  57 female with PMH breast cancer mets to bone, lung, and lymph nodes dx 3/2014 s/p bilateral mastectomy w/ LN dissection, chemotherapy and radiation therapy presents to pre surgical testing.  Pt reports she started to have headaches in March 2017, o brain MRI showed metastatic disease to brain. Pt reports her last chemotherapy treatment was 7/3/17.  Pt is s/p craniotomy 3/15/17.  Pt reports she had gamma knife procedure 3/30/17 and again on June 12, 2017.  Pt reports when equipment for gamma knife procedure was place on pt's head on June 2017, she heard a loud pop and when Dr. Estes examined her, she was told her craniotomy flap had depressed.  Pt reports CT and MRI done.  Pt reports she is experiencing pain and blurred vision.  Pt is scheduled for elevation of craniotomy flap on 7/12/17. (10 Jul 2017 08:11)       ICU Vital Signs Last 24 Hrs  T(C): 36.8 (12 Jul 2017 20:05), Max: 36.8 (12 Jul 2017 13:28)  T(F): 98.2 (12 Jul 2017 20:05), Max: 98.3 (12 Jul 2017 13:28)  HR: 85 (12 Jul 2017 22:15) (65 - 85)  BP: 114/64 (12 Jul 2017 22:00) (114/64 - 136/84)  BP(mean): --  ABP: --  ABP(mean): --  RR: 14 (12 Jul 2017 22:15) (12 - 19)  SpO2: 98% (12 Jul 2017 22:15) (98% - 100%)    Exam    Awake, alert ,responsive,conversant  Rt parietal wound dressing dry and intact with SHERI drain  Pupils = R, EOM intact  FC+ on all 4 ext, HSU with good strength  Hemodynamically stable      Post op head CT in AM NEUROSURGERY    Post Op Check List    Patient is a 57y old  Female who presents with a chief complaint of "fixing craniotomy flap" (10 Jul 2017 08:11) S/p Cranioplasty for displaced crani flap.    HPI:  57 female with PMH breast cancer mets to bone, lung, and lymph nodes dx 3/2014 s/p bilateral mastectomy w/ LN dissection, chemotherapy and radiation therapy presents to pre surgical testing.  Pt reports she started to have headaches in March 2017, o brain MRI showed metastatic disease to brain. Pt reports her last chemotherapy treatment was 7/3/17.  Pt is s/p craniotomy 3/15/17.  Pt reports she had gamma knife procedure 3/30/17 and again on June 12, 2017.  Pt reports when equipment for gamma knife procedure was place on pt's head on June 2017, she heard a loud pop and when Dr. Estes examined her, she was told her craniotomy flap had depressed.  Pt reports CT and MRI done.  Pt reports she is experiencing pain and blurred vision.  Pt is scheduled for elevation of craniotomy flap on 7/12/17. (10 Jul 2017 08:11)       ICU Vital Signs Last 24 Hrs  T(C): 36.8 (12 Jul 2017 20:05), Max: 36.8 (12 Jul 2017 13:28)  T(F): 98.2 (12 Jul 2017 20:05), Max: 98.3 (12 Jul 2017 13:28)  HR: 85 (12 Jul 2017 22:15) (65 - 85)  BP: 114/64 (12 Jul 2017 22:00) (114/64 - 136/84)  BP(mean): --  ABP: --  ABP(mean): --  RR: 14 (12 Jul 2017 22:15) (12 - 19)  SpO2: 98% (12 Jul 2017 22:15) (98% - 100%)    Exam    Awake, alert ,responsive,conversant  Rt parietal wound dressing dry and intact with SHERI drain-25cc bloody drainage  Pupils = R, EOM intact  FC+ on all 4 ext, HSU with good strength  Hemodynamically stable      Post op head CT in AM

## 2017-07-13 ENCOUNTER — TRANSCRIPTION ENCOUNTER (OUTPATIENT)
Age: 57
End: 2017-07-13

## 2017-07-13 VITALS
RESPIRATION RATE: 13 BRPM | DIASTOLIC BLOOD PRESSURE: 63 MMHG | HEART RATE: 71 BPM | OXYGEN SATURATION: 100 % | SYSTOLIC BLOOD PRESSURE: 104 MMHG

## 2017-07-13 LAB
ALBUMIN SERPL ELPH-MCNC: 3.6 G/DL — SIGNIFICANT CHANGE UP (ref 3.3–5)
ALP SERPL-CCNC: 48 U/L — SIGNIFICANT CHANGE UP (ref 40–120)
ALT FLD-CCNC: 9 U/L — SIGNIFICANT CHANGE UP (ref 4–33)
APTT BLD: 30.1 SEC — SIGNIFICANT CHANGE UP (ref 27.5–37.4)
AST SERPL-CCNC: 16 U/L — SIGNIFICANT CHANGE UP (ref 4–32)
BASOPHILS # BLD AUTO: 0.02 K/UL — SIGNIFICANT CHANGE UP (ref 0–0.2)
BASOPHILS NFR BLD AUTO: 0.4 % — SIGNIFICANT CHANGE UP (ref 0–2)
BILIRUB SERPL-MCNC: 0.2 MG/DL — SIGNIFICANT CHANGE UP (ref 0.2–1.2)
BUN SERPL-MCNC: 8 MG/DL — SIGNIFICANT CHANGE UP (ref 7–23)
CALCIUM SERPL-MCNC: 8 MG/DL — LOW (ref 8.4–10.5)
CHLORIDE SERPL-SCNC: 102 MMOL/L — SIGNIFICANT CHANGE UP (ref 98–107)
CO2 SERPL-SCNC: 22 MMOL/L — SIGNIFICANT CHANGE UP (ref 22–31)
CREAT SERPL-MCNC: 0.67 MG/DL — SIGNIFICANT CHANGE UP (ref 0.5–1.3)
EOSINOPHIL # BLD AUTO: 0.02 K/UL — SIGNIFICANT CHANGE UP (ref 0–0.5)
EOSINOPHIL NFR BLD AUTO: 0.4 % — SIGNIFICANT CHANGE UP (ref 0–6)
GLUCOSE SERPL-MCNC: 132 MG/DL — HIGH (ref 70–99)
HCT VFR BLD CALC: 29.7 % — LOW (ref 34.5–45)
HGB BLD-MCNC: 9.5 G/DL — LOW (ref 11.5–15.5)
IMM GRANULOCYTES # BLD AUTO: 0.03 # — SIGNIFICANT CHANGE UP
IMM GRANULOCYTES NFR BLD AUTO: 0.6 % — SIGNIFICANT CHANGE UP (ref 0–1.5)
INR BLD: 0.97 — SIGNIFICANT CHANGE UP (ref 0.88–1.17)
LYMPHOCYTES # BLD AUTO: 1.1 K/UL — SIGNIFICANT CHANGE UP (ref 1–3.3)
LYMPHOCYTES # BLD AUTO: 21.9 % — SIGNIFICANT CHANGE UP (ref 13–44)
MCHC RBC-ENTMCNC: 31.4 PG — SIGNIFICANT CHANGE UP (ref 27–34)
MCHC RBC-ENTMCNC: 32 % — SIGNIFICANT CHANGE UP (ref 32–36)
MCV RBC AUTO: 98 FL — SIGNIFICANT CHANGE UP (ref 80–100)
MONOCYTES # BLD AUTO: 0.46 K/UL — SIGNIFICANT CHANGE UP (ref 0–0.9)
MONOCYTES NFR BLD AUTO: 8 % — SIGNIFICANT CHANGE UP (ref 2–14)
NEUTROPHILS # BLD AUTO: 3.39 K/UL — SIGNIFICANT CHANGE UP (ref 1.8–7.4)
NEUTROPHILS NFR BLD AUTO: 67.5 % — SIGNIFICANT CHANGE UP (ref 43–77)
NRBC # FLD: 0 — SIGNIFICANT CHANGE UP
PLATELET # BLD AUTO: 86 K/UL — LOW (ref 150–400)
PMV BLD: 10.2 FL — SIGNIFICANT CHANGE UP (ref 7–13)
POTASSIUM SERPL-MCNC: 4.2 MMOL/L — SIGNIFICANT CHANGE UP (ref 3.5–5.3)
POTASSIUM SERPL-SCNC: 4.2 MMOL/L — SIGNIFICANT CHANGE UP (ref 3.5–5.3)
PROT SERPL-MCNC: 5.9 G/DL — LOW (ref 6–8.3)
PROTHROM AB SERPL-ACNC: 10.9 SEC — SIGNIFICANT CHANGE UP (ref 9.8–13.1)
RBC # BLD: 3.03 M/UL — LOW (ref 3.8–5.2)
RBC # FLD: 14.1 % — SIGNIFICANT CHANGE UP (ref 10.3–14.5)
SODIUM SERPL-SCNC: 139 MMOL/L — SIGNIFICANT CHANGE UP (ref 135–145)
WBC # BLD: 5.02 K/UL — SIGNIFICANT CHANGE UP (ref 3.8–10.5)
WBC # FLD AUTO: 5.02 K/UL — SIGNIFICANT CHANGE UP (ref 3.8–10.5)

## 2017-07-13 PROCEDURE — 70450 CT HEAD/BRAIN W/O DYE: CPT | Mod: 26

## 2017-07-13 RX ORDER — HYDROMORPHONE HYDROCHLORIDE 2 MG/ML
2 INJECTION INTRAMUSCULAR; INTRAVENOUS; SUBCUTANEOUS ONCE
Qty: 0 | Refills: 0 | Status: DISCONTINUED | OUTPATIENT
Start: 2017-07-13 | End: 2017-07-13

## 2017-07-13 RX ORDER — OXYCODONE HYDROCHLORIDE 5 MG/1
5 TABLET ORAL EVERY 4 HOURS
Qty: 0 | Refills: 0 | Status: DISCONTINUED | OUTPATIENT
Start: 2017-07-13 | End: 2017-07-13

## 2017-07-13 RX ORDER — OXYCODONE HYDROCHLORIDE 5 MG/1
1 TABLET ORAL
Qty: 30 | Refills: 0 | OUTPATIENT
Start: 2017-07-13 | End: 2017-07-18

## 2017-07-13 RX ADMIN — HYDROMORPHONE HYDROCHLORIDE 2 MILLIGRAM(S): 2 INJECTION INTRAMUSCULAR; INTRAVENOUS; SUBCUTANEOUS at 06:30

## 2017-07-13 RX ADMIN — GABAPENTIN 300 MILLIGRAM(S): 400 CAPSULE ORAL at 06:53

## 2017-07-13 RX ADMIN — Medication 100 MILLIGRAM(S): at 02:37

## 2017-07-13 RX ADMIN — Medication 100 MILLIGRAM(S): at 09:35

## 2017-07-13 RX ADMIN — HYDROMORPHONE HYDROCHLORIDE 0.6 MILLIGRAM(S): 2 INJECTION INTRAMUSCULAR; INTRAVENOUS; SUBCUTANEOUS at 03:00

## 2017-07-13 RX ADMIN — ONDANSETRON 4 MILLIGRAM(S): 8 TABLET, FILM COATED ORAL at 05:33

## 2017-07-13 RX ADMIN — OXYCODONE HYDROCHLORIDE 5 MILLIGRAM(S): 5 TABLET ORAL at 09:35

## 2017-07-13 RX ADMIN — OXYCODONE HYDROCHLORIDE 5 MILLIGRAM(S): 5 TABLET ORAL at 10:20

## 2017-07-13 RX ADMIN — HYDROMORPHONE HYDROCHLORIDE 2 MILLIGRAM(S): 2 INJECTION INTRAMUSCULAR; INTRAVENOUS; SUBCUTANEOUS at 05:36

## 2017-07-13 RX ADMIN — HYDROMORPHONE HYDROCHLORIDE 0.6 MILLIGRAM(S): 2 INJECTION INTRAMUSCULAR; INTRAVENOUS; SUBCUTANEOUS at 02:35

## 2017-07-13 NOTE — DISCHARGE NOTE ADULT - CONDITIONS AT DISCHARGE
Pt alert and oriented, neurologic checks intact with no changes. Pt with no c/o pain and comfortable. Discharge instructions and medication reconciliation given to pt. IV lock removed. Dressing to occipital area clean dry and intact. Pt alert and oriented, neurologic checks intact with no changes. Pt with no c/o pain and comfortable. Discharge instructions and medication reconciliation given to pt. IV lock removed. Dressing to occipital area clean dry and intact and 2 staples present to back of head.

## 2017-07-13 NOTE — DISCHARGE NOTE ADULT - CARE PLAN
Principal Discharge DX:	Displacement of bone flap, subsequent encounter  Goal:	s/p repair  Instructions for follow-up, activity and diet:	Follow up in 10-14 days for staple removal in Dr oconnor office  May shower and shampoo post op day #4

## 2017-07-13 NOTE — PROGRESS NOTE ADULT - SUBJECTIVE AND OBJECTIVE BOX
ANESTHESIA POSTOP CHECK    57y Female POSTOP DAY 1 S/P     Vital Signs Last 24 Hrs  T(C): 36.7 (13 Jul 2017 06:00), Max: 36.8 (12 Jul 2017 13:28)  T(F): 98.1 (13 Jul 2017 06:00), Max: 98.3 (12 Jul 2017 13:28)  HR: 77 (13 Jul 2017 09:00) (65 - 85)  BP: 110/78 (13 Jul 2017 09:00) (95/64 - 136/84)  BP(mean): --  RR: 14 (13 Jul 2017 09:00) (11 - 19)  SpO2: 100% (13 Jul 2017 09:00) (96% - 100%)  I&O's Summary    12 Jul 2017 07:01  -  13 Jul 2017 07:00  --------------------------------------------------------  IN: 1200 mL / OUT: 625 mL / NET: 575 mL    13 Jul 2017 07:01  -  13 Jul 2017 10:48  --------------------------------------------------------  IN: 465 mL / OUT: 400 mL / NET: 65 mL        [x ] NO APPARENT ANESTHESIA COMPLICATIONS      Comments:

## 2017-07-13 NOTE — DISCHARGE NOTE ADULT - CARE PROVIDER_API CALL
Ned Estes), Neurosurgery  General  [address] [place] [address]  Phone: [phone]  Fax: [phone]

## 2017-07-13 NOTE — DISCHARGE NOTE ADULT - MEDICATION SUMMARY - MEDICATIONS TO TAKE
I will START or STAY ON the medications listed below when I get home from the hospital:    venkat 600  -- 1 tab(s) by mouth once a day  -- Indication: For home med    oxyCODONE 5 mg oral tablet  -- 1 tab(s) by mouth every 4 hours, As needed, Moderate Pain (4 - 6) MDD:6  -- Indication: For pain    Tylenol 500 mg oral tablet  -- 2 tab(s) by mouth every 6 hours, As Needed  -- Indication: For pain    gabapentin 300 mg oral tablet  --  by mouth 3 times a day, As Needed  -- Indication: For home med    Vitamin D3 1000 intl units oral capsule  -- 1 cap(s) by mouth once a day  -- Indication: For vitamin

## 2017-07-13 NOTE — DISCHARGE NOTE ADULT - HOSPITAL COURSE
This is a 57y female s/p brain surgery for metastatic brain tumor s/p gamma knife therapy in june of 2017. post gamma knife she had a cranial defect - bone flap became depressed. She was taken to the OR on 7/12 for an elevation and repair of bone flap. Post op CT head was stable. She had a SHERI that was removed post op day #1. Has been OOB walking well and tolerating pain and diet. stable for discharge home today.

## 2017-07-13 NOTE — PROGRESS NOTE ADULT - SUBJECTIVE AND OBJECTIVE BOX
SICU Progress Note:     HISTORY  HPI:  57 female with PMH breast cancer mets to bone, lung, and lymph nodes dx 3/2014 s/p bilateral mastectomy w/ LN dissection, chemotherapy and radiation therapy presents to pre surgical testing.  Pt reports she started to have headaches in March 2017, o brain MRI showed metastatic disease to brain. Pt reports her last chemotherapy treatment was 7/3/17.  Pt is s/p craniotomy 3/15/17.  Pt reports she had gamma knife procedure 3/30/17 and again on June 12, 2017.  Pt reports when equipment for gamma knife procedure was place on pt's head on June 2017, she heard a loud pop and when Dr. Estes examined her, she was told her craniotomy flap had depressed.  Pt reports CT and MRI done.  Pt reports she is experiencing pain and blurred vision.  Pt is scheduled for elevation of craniotomy flap on 7/12/17. (10 Jul 2017 08:11)    Interval events:   Did well overnight.  Remained neurologically intact. Has some pain in her head.     Allergies:   PAST MEDICAL & SURGICAL HISTORY:  Brachial neuritis: left  Breast cancer  Breast cancer: left, mets to bone, lung, lymph nodes dx in 2014  mets to brain dx 2017  H/O brain surgery: craniotomy 3/2017  H/O bilateral mastectomy  H/O bilateral mastectomy: 2014      ADVANCE DIRECTIVES: Full code        CURRENT MEDICATIONS:   --------------------------------------------------------------------------------------  Neurologic Medications  acetaminophen   Tablet 650 milliGRAM(s) Oral every 6 hours PRN For Temp greater than 38 C (100.4 F)  acetaminophen   Tablet. 650 milliGRAM(s) Oral every 6 hours PRN Mild Pain (1 - 3)  oxyCODONE    5 mG/acetaminophen 325 mG 1 Tablet(s) Oral every 4 hours PRN Moderate Pain  gabapentin 300 milliGRAM(s) Oral three times a day  fentaNYL    Injectable 25 MICROGram(s) IV Push every 5 minutes PRN Mild Pain (1 - 3)  HYDROmorphone  Injectable 0.3 milliGRAM(s) IV Push every 10 minutes PRN Moderate Pain (4 - 6)  HYDROmorphone  Injectable 0.6 milliGRAM(s) IV Push every 10 minutes PRN Severe Pain (7 - 10)  ondansetron Injectable 4 milliGRAM(s) IV Push once PRN Nausea and/or Vomiting    Respiratory Medications: none  Cardiovascular Medications: none  Gastrointestinal Medications  lactated ringers. 1000 milliLiter(s) IV Continuous <Continuous>  sodium chloride 0.9% with potassium chloride 20 mEq/L 1000 milliLiter(s) IV Continuous <Continuous>  cholecalciferol 1000 Unit(s) Oral daily  Genitourinary Medications: none  Hematologic/Oncologic Medications: none  Antimicrobial/Immunologic Medications: none  Endocrine/Metabolic Medications: none    --------------------------------------------------------------------------------------  T(C): 36.8 (07-13-17 @ 04:00), Max: 36.8 (07-12-17 @ 13:28)  HR: 68 (07-13-17 @ 04:00) (65 - 85)  BP: 95/64 (07-13-17 @ 04:00) (95/64 - 136/84)  RR: 12 (07-13-17 @ 04:00) (11 - 19)  SpO2: 96% (07-13-17 @ 04:00) (96% - 100%)    07-12 @ 07:01  -  07-13 @ 04:15  --------------------------------------------------------  IN:    Oral Fluid: 300 mL    sodium chloride 0.9% with potassium chloride 20 mEq/L: 675 mL  Total IN: 975 mL    OUT:    Bulb: 60 mL  Total OUT: 60 mL    Total NET: 915 mL      mL        --------------------------------------------------------------------------------------    EXAM  NEUROLOGY  RASS:  0 	GCS:  15  Exam: NAD, awake and alert, 5/5 strength bilateral lower extremities, RUE 4/5, LUE 5/5.  Sensation to light touch intact. Smile symmetric     HEENT  Exam: Right sided SHERI drain serosangous, dressing clean, dry, intact    RESPIRATORY  Exam: Lungs clear to auscultation      CARDIOVASCULAR  Exam: S1, S2.  Regular rate and rhythm.      GI/NUTRITION  soft,NT, ND  Current Diet:  Reg    VASCULAR  Exam: Extremities warm, pink, well-perfused.       SKIN:  Exam: Good skin turgor,    METABOLIC/FLUIDS/ELECTROLYTES  lactated ringers. 1000 milliLiter(s) IV Continuous <Continuous>  sodium chloride 0.9% with potassium chloride 20 mEq/L 1000 milliLiter(s) IV Continuous <Continuous>  cholecalciferol 1000 Unit(s) Oral daily      HEMATOLOGIC  [] DVT Prophylaxis: held for intracranial surgery; venodynes       INFECTIOUS DISEASE  Antimicrobials/Immunologic Medications: None    Tubes/Lines/Drains    [x] Peripheral IV  [] Central Venous Line     	[] R	[] L	[] IJ	[] Fem	[] SC	Date Placed:   [] Arterial Line		[] R	[] L	[] Fem	[] Rad	[] Ax	Date Placed:   [] PICC:         	[] Midline		[] Mediport  [] Urinary Catheter		Date Placed:   SHERI drain- right drain     LABS  --------------------------------------------------------------------------------------  No recent      ASSESSMENT:  57y Female s/p     PLAN: craniectomy, replacement of bone flap for skull defect  Neurologic: q 1 neuro checks- advance to q4, Tylenol and oxycodone for pain   Respiratory: no acute issues   Cardiovascular: no acute issues   Gastrointestinal/Nutrition: Reg diet   Renal/Genitourinary: No acute issues   Hematologic: Hold chemical DVT ppx given intracranial surgery, venodynes   Infectious Disease: no concerns for infection   Tubes/Lines/Drains: SHERI drain   Endocrine: no active issues   Disposition: transfer to floor      --------------------------------------------------------------------------------------    Critical Care Diagnoses:  Skull fracture

## 2017-07-13 NOTE — DISCHARGE NOTE ADULT - PATIENT PORTAL LINK FT
“You can access the [employer] Patient Portal, offered by [facility], by registering with the following website: [email or web address]”

## 2017-07-13 NOTE — DISCHARGE NOTE ADULT - PLAN OF CARE
s/p repair Follow up in 10-14 days for staple removal in Dr oconnor office  May shower and shampoo post op day #4

## 2017-07-13 NOTE — PROGRESS NOTE ADULT - SUBJECTIVE AND OBJECTIVE BOX
HPI:  57 female with PMH breast cancer mets to bone, lung, and lymph nodes dx 3/2014 s/p bilateral mastectomy w/ LN dissection, chemotherapy and radiation therapy presents to pre surgical testing.  Pt reports she started to have headaches in March 2017, o brain MRI showed metastatic disease to brain. Pt reports her last chemotherapy treatment was 7/3/17.  Pt is s/p craniotomy 3/15/17.  Pt reports she had gamma knife procedure 3/30/17 and again on June 12, 2017.  Pt reports when equipment for gamma knife procedure was place on pt's head on June 2017, she heard a loud pop and when Dr. Estes examined her, she was told her craniotomy flap had depressed.  Pt reports CT and MRI done.  Pt reports she is experiencing pain and blurred vision.  Pt is scheduled for elevation of craniotomy flap on 7/12/17. (10 Jul 2017 08:11)      OVERNIGHT EVENTS:  + episode of vomiting this am, otherwise stable    Vital Signs Last 24 Hrs  T(C): 36.7 (13 Jul 2017 06:00), Max: 36.8 (12 Jul 2017 13:28)  T(F): 98.1 (13 Jul 2017 06:00), Max: 98.3 (12 Jul 2017 13:28)  HR: 77 (13 Jul 2017 08:00) (65 - 85)  BP: 106/77 (13 Jul 2017 08:00) (95/64 - 136/84)  BP(mean): --  RR: 16 (13 Jul 2017 08:00) (11 - 19)  SpO2: 98% (13 Jul 2017 08:00) (96% - 100%)    I&O's Summary    12 Jul 2017 07:01  -  13 Jul 2017 07:00  --------------------------------------------------------  IN: 1200 mL / OUT: 625 mL / NET: 575 mL    13 Jul 2017 07:01  -  13 Jul 2017 08:45  --------------------------------------------------------  IN: 225 mL / OUT: 0 mL / NET: 225 mL        PHYSICAL EXAM:  Mental Staus: Awake, Alert, Affect appropriate  Ox3  Cranial Nerves: Normal bilaterally  Motor:  Intact  Strength: 5/5 all 4 extremities  Sensation: Intact to light touch    Incision/Wound: c/d/i    TUBES/LINES:    [ ] Other- SHERI 40cc    DIET:  [ ] Regular    LABS:                        9.5    5.02  )-----------( 86       ( 13 Jul 2017 05:06 )             29.7     07-13    139  |  102  |  8   ----------------------------<  132<H>  4.2   |  22  |  0.67    Ca    8.0<L>      13 Jul 2017 05:06    TPro  5.9<L>  /  Alb  3.6  /  TBili  0.2  /  DBili  x   /  AST  16  /  ALT  9   /  AlkPhos  48  07-13    PT/INR - ( 13 Jul 2017 05:06 )   PT: 10.9 SEC;   INR: 0.97          PTT - ( 13 Jul 2017 05:06 )  PTT:30.1 SEC      CULTURES:      CSF Analysis:       Drug Levels:       Allergies    No Known Allergies    Intolerances        MEDICATIONS:  Antibiotics:  ceFAZolin   IVPB 2000 milliGRAM(s) IV Intermittent every 8 hours    Neuro:  acetaminophen   Tablet 650 milliGRAM(s) Oral every 6 hours PRN  acetaminophen   Tablet. 650 milliGRAM(s) Oral every 6 hours PRN  oxyCODONE    5 mG/acetaminophen 325 mG 1 Tablet(s) Oral every 4 hours PRN  gabapentin 300 milliGRAM(s) Oral three times a day  fentaNYL    Injectable 25 MICROGram(s) IV Push every 5 minutes PRN  HYDROmorphone  Injectable 0.3 milliGRAM(s) IV Push every 10 minutes PRN  HYDROmorphone  Injectable 0.6 milliGRAM(s) IV Push every 10 minutes PRN    Anticoagulation    OTHER:    IVF:  sodium chloride 0.9% with potassium chloride 20 mEq/L 1000 milliLiter(s) IV Continuous <Continuous>  cholecalciferol 1000 Unit(s) Oral daily      DVT PROPHYLAXIS:  [] Venodynes                                   RADIOLOGY & ADDITIONAL TESTS:  < from: CT Head No Cont (07.13.17 @ 06:39) >  IMPRESSION:  No acute intracranial hemorrhage.  Right parietal postoperative change.    < end of copied text >

## 2017-07-15 ENCOUNTER — TRANSCRIPTION ENCOUNTER (OUTPATIENT)
Age: 57
End: 2017-07-15

## 2017-07-25 ENCOUNTER — APPOINTMENT (OUTPATIENT)
Dept: NEUROSURGERY | Facility: CLINIC | Age: 57
End: 2017-07-25

## 2017-07-25 VITALS
HEIGHT: 60 IN | DIASTOLIC BLOOD PRESSURE: 84 MMHG | BODY MASS INDEX: 29.06 KG/M2 | SYSTOLIC BLOOD PRESSURE: 132 MMHG | HEART RATE: 81 BPM | WEIGHT: 148 LBS

## 2017-07-28 ENCOUNTER — INPATIENT (INPATIENT)
Facility: HOSPITAL | Age: 57
LOS: 4 days | Discharge: HOME | End: 2017-08-02
Attending: STUDENT IN AN ORGANIZED HEALTH CARE EDUCATION/TRAINING PROGRAM | Admitting: STUDENT IN AN ORGANIZED HEALTH CARE EDUCATION/TRAINING PROGRAM
Payer: MEDICAID

## 2017-07-28 VITALS
SYSTOLIC BLOOD PRESSURE: 131 MMHG | TEMPERATURE: 98 F | OXYGEN SATURATION: 98 % | HEART RATE: 80 BPM | RESPIRATION RATE: 18 BRPM | DIASTOLIC BLOOD PRESSURE: 87 MMHG

## 2017-07-28 DIAGNOSIS — C50.919 MALIGNANT NEOPLASM OF UNSPECIFIED SITE OF UNSPECIFIED FEMALE BREAST: ICD-10-CM

## 2017-07-28 DIAGNOSIS — Z98.890 OTHER SPECIFIED POSTPROCEDURAL STATES: ICD-10-CM

## 2017-07-28 DIAGNOSIS — Z90.13 ACQUIRED ABSENCE OF BILATERAL BREASTS AND NIPPLES: Chronic | ICD-10-CM

## 2017-07-28 DIAGNOSIS — R27.0 ATAXIA, UNSPECIFIED: ICD-10-CM

## 2017-07-28 DIAGNOSIS — Z98.890 OTHER SPECIFIED POSTPROCEDURAL STATES: Chronic | ICD-10-CM

## 2017-07-28 DIAGNOSIS — Z29.9 ENCOUNTER FOR PROPHYLACTIC MEASURES, UNSPECIFIED: ICD-10-CM

## 2017-07-28 DIAGNOSIS — G93.6 CEREBRAL EDEMA: ICD-10-CM

## 2017-07-28 LAB
ALBUMIN SERPL ELPH-MCNC: 4.5 G/DL — SIGNIFICANT CHANGE UP (ref 3.3–5)
ALP SERPL-CCNC: 59 U/L — SIGNIFICANT CHANGE UP (ref 40–120)
ALT FLD-CCNC: 8 U/L — SIGNIFICANT CHANGE UP (ref 4–33)
APPEARANCE UR: SIGNIFICANT CHANGE UP
APTT BLD: 33.5 SEC — SIGNIFICANT CHANGE UP (ref 27.5–37.4)
AST SERPL-CCNC: 16 U/L — SIGNIFICANT CHANGE UP (ref 4–32)
BACTERIA # UR AUTO: SIGNIFICANT CHANGE UP
BASOPHILS # BLD AUTO: 0.02 K/UL — SIGNIFICANT CHANGE UP (ref 0–0.2)
BASOPHILS NFR BLD AUTO: 0.3 % — SIGNIFICANT CHANGE UP (ref 0–2)
BILIRUB SERPL-MCNC: 0.2 MG/DL — SIGNIFICANT CHANGE UP (ref 0.2–1.2)
BILIRUB UR-MCNC: NEGATIVE — SIGNIFICANT CHANGE UP
BLD GP AB SCN SERPL QL: NEGATIVE — SIGNIFICANT CHANGE UP
BLOOD UR QL VISUAL: HIGH
BUN SERPL-MCNC: 10 MG/DL — SIGNIFICANT CHANGE UP (ref 7–23)
CALCIUM SERPL-MCNC: 9.6 MG/DL — SIGNIFICANT CHANGE UP (ref 8.4–10.5)
CHLORIDE SERPL-SCNC: 104 MMOL/L — SIGNIFICANT CHANGE UP (ref 98–107)
CK SERPL-CCNC: 52 U/L — SIGNIFICANT CHANGE UP (ref 25–170)
CO2 SERPL-SCNC: 27 MMOL/L — SIGNIFICANT CHANGE UP (ref 22–31)
COLOR SPEC: SIGNIFICANT CHANGE UP
CREAT SERPL-MCNC: 0.65 MG/DL — SIGNIFICANT CHANGE UP (ref 0.5–1.3)
EOSINOPHIL # BLD AUTO: 0.01 K/UL — SIGNIFICANT CHANGE UP (ref 0–0.5)
EOSINOPHIL NFR BLD AUTO: 0.1 % — SIGNIFICANT CHANGE UP (ref 0–6)
GLUCOSE SERPL-MCNC: 127 MG/DL — HIGH (ref 70–99)
GLUCOSE UR-MCNC: NEGATIVE — SIGNIFICANT CHANGE UP
HCT VFR BLD CALC: 33.9 % — LOW (ref 34.5–45)
HGB BLD-MCNC: 10.8 G/DL — LOW (ref 11.5–15.5)
IMM GRANULOCYTES # BLD AUTO: 0.03 # — SIGNIFICANT CHANGE UP
IMM GRANULOCYTES NFR BLD AUTO: 0.4 % — SIGNIFICANT CHANGE UP (ref 0–1.5)
INR BLD: 0.96 — SIGNIFICANT CHANGE UP (ref 0.88–1.17)
KETONES UR-MCNC: SIGNIFICANT CHANGE UP
LEUKOCYTE ESTERASE UR-ACNC: HIGH
LYMPHOCYTES # BLD AUTO: 1.41 K/UL — SIGNIFICANT CHANGE UP (ref 1–3.3)
LYMPHOCYTES # BLD AUTO: 17.6 % — SIGNIFICANT CHANGE UP (ref 13–44)
MCHC RBC-ENTMCNC: 30.9 PG — SIGNIFICANT CHANGE UP (ref 27–34)
MCHC RBC-ENTMCNC: 31.9 % — LOW (ref 32–36)
MCV RBC AUTO: 97.1 FL — SIGNIFICANT CHANGE UP (ref 80–100)
MONOCYTES # BLD AUTO: 0.4 K/UL — SIGNIFICANT CHANGE UP (ref 0–0.9)
MONOCYTES NFR BLD AUTO: 5 % — SIGNIFICANT CHANGE UP (ref 2–14)
MUCOUS THREADS # UR AUTO: SIGNIFICANT CHANGE UP
NEUTROPHILS # BLD AUTO: 6.13 K/UL — SIGNIFICANT CHANGE UP (ref 1.8–7.4)
NEUTROPHILS NFR BLD AUTO: 76.6 % — SIGNIFICANT CHANGE UP (ref 43–77)
NITRITE UR-MCNC: NEGATIVE — SIGNIFICANT CHANGE UP
NON-SQ EPI CELLS # UR AUTO: <1 — SIGNIFICANT CHANGE UP
NRBC # FLD: 0 — SIGNIFICANT CHANGE UP
PH UR: 7 — SIGNIFICANT CHANGE UP (ref 4.6–8)
PLATELET # BLD AUTO: 263 K/UL — SIGNIFICANT CHANGE UP (ref 150–400)
PMV BLD: 8.8 FL — SIGNIFICANT CHANGE UP (ref 7–13)
POTASSIUM SERPL-MCNC: 4.2 MMOL/L — SIGNIFICANT CHANGE UP (ref 3.5–5.3)
POTASSIUM SERPL-SCNC: 4.2 MMOL/L — SIGNIFICANT CHANGE UP (ref 3.5–5.3)
PROT SERPL-MCNC: 7.7 G/DL — SIGNIFICANT CHANGE UP (ref 6–8.3)
PROT UR-MCNC: NEGATIVE — SIGNIFICANT CHANGE UP
PROTHROM AB SERPL-ACNC: 10.7 SEC — SIGNIFICANT CHANGE UP (ref 9.8–13.1)
RBC # BLD: 3.49 M/UL — LOW (ref 3.8–5.2)
RBC # FLD: 14.3 % — SIGNIFICANT CHANGE UP (ref 10.3–14.5)
RBC CASTS # UR COMP ASSIST: SIGNIFICANT CHANGE UP (ref 0–?)
RH IG SCN BLD-IMP: POSITIVE — SIGNIFICANT CHANGE UP
SODIUM SERPL-SCNC: 145 MMOL/L — SIGNIFICANT CHANGE UP (ref 135–145)
SP GR SPEC: 1.01 — SIGNIFICANT CHANGE UP (ref 1–1.03)
SQUAMOUS # UR AUTO: SIGNIFICANT CHANGE UP
TROPONIN T SERPL-MCNC: < 0.06 NG/ML — SIGNIFICANT CHANGE UP (ref 0–0.06)
UROBILINOGEN FLD QL: NORMAL E.U. — SIGNIFICANT CHANGE UP (ref 0.1–0.2)
WBC # BLD: 8 K/UL — SIGNIFICANT CHANGE UP (ref 3.8–10.5)
WBC # FLD AUTO: 8 K/UL — SIGNIFICANT CHANGE UP (ref 3.8–10.5)
WBC UR QL: SIGNIFICANT CHANGE UP (ref 0–?)

## 2017-07-28 PROCEDURE — 70450 CT HEAD/BRAIN W/O DYE: CPT | Mod: 26

## 2017-07-28 PROCEDURE — 71010: CPT | Mod: 26

## 2017-07-28 RX ORDER — ONDANSETRON 8 MG/1
8 TABLET, FILM COATED ORAL ONCE
Qty: 0 | Refills: 0 | Status: COMPLETED | OUTPATIENT
Start: 2017-07-28 | End: 2017-07-28

## 2017-07-28 RX ORDER — DEXAMETHASONE 0.5 MG/5ML
4 ELIXIR ORAL ONCE
Qty: 0 | Refills: 0 | Status: COMPLETED | OUTPATIENT
Start: 2017-07-28 | End: 2017-07-28

## 2017-07-28 RX ORDER — ACETAMINOPHEN 500 MG
1000 TABLET ORAL ONCE
Qty: 0 | Refills: 0 | Status: COMPLETED | OUTPATIENT
Start: 2017-07-28 | End: 2017-07-28

## 2017-07-28 RX ORDER — GABAPENTIN 400 MG/1
300 CAPSULE ORAL THREE TIMES A DAY
Qty: 0 | Refills: 0 | Status: DISCONTINUED | OUTPATIENT
Start: 2017-07-28 | End: 2017-07-31

## 2017-07-28 RX ORDER — OXYCODONE HYDROCHLORIDE 5 MG/1
5 TABLET ORAL EVERY 4 HOURS
Qty: 0 | Refills: 0 | Status: DISCONTINUED | OUTPATIENT
Start: 2017-07-28 | End: 2017-07-31

## 2017-07-28 RX ORDER — SODIUM CHLORIDE 9 MG/ML
1000 INJECTION INTRAMUSCULAR; INTRAVENOUS; SUBCUTANEOUS ONCE
Qty: 0 | Refills: 0 | Status: COMPLETED | OUTPATIENT
Start: 2017-07-28 | End: 2017-07-28

## 2017-07-28 RX ORDER — ACETAMINOPHEN 500 MG
650 TABLET ORAL EVERY 6 HOURS
Qty: 0 | Refills: 0 | Status: DISCONTINUED | OUTPATIENT
Start: 2017-07-28 | End: 2017-07-31

## 2017-07-28 RX ORDER — DEXAMETHASONE 0.5 MG/5ML
4 ELIXIR ORAL EVERY 6 HOURS
Qty: 0 | Refills: 0 | Status: DISCONTINUED | OUTPATIENT
Start: 2017-07-28 | End: 2017-07-31

## 2017-07-28 RX ADMIN — Medication 400 MILLIGRAM(S): at 17:22

## 2017-07-28 RX ADMIN — Medication 4 MILLIGRAM(S): at 17:15

## 2017-07-28 RX ADMIN — Medication 1000 MILLIGRAM(S): at 17:52

## 2017-07-28 RX ADMIN — ONDANSETRON 8 MILLIGRAM(S): 8 TABLET, FILM COATED ORAL at 17:18

## 2017-07-28 RX ADMIN — SODIUM CHLORIDE 1000 MILLILITER(S): 9 INJECTION INTRAMUSCULAR; INTRAVENOUS; SUBCUTANEOUS at 17:30

## 2017-07-28 NOTE — H&P ADULT - ASSESSMENT
56 y/o F with PMH breast cancer mets to bone, lung, and lymph nodes dx 3/2014 s/p bilateral mastectomy w/ LN dissection, chemotherapy and radiation therapy found to have metastasis to brain metastasis s/p craniotomy last week  and gamma knife treatment presents to the ED with gait instability and blurry vision likely 2/2 to progression of metastatic disease to the brain

## 2017-07-28 NOTE — ED ADULT TRIAGE NOTE - CHIEF COMPLAINT QUOTE
states" I am very disoriented , seeing flashes and disoriented since 157pm today" vomited x3 this afternoon. patient was fine prior to that. h/o left breast cancer and brain tumor with craniotomy and gamma knife treatment . bilateral mastectomy with last chemo 2 months ago. FIT doctor called for stroke eval. FS 94 states" I am very disoriented , seeing flashes and disoriented since 157pm today" vomited x3 this afternoon. patient was fine prior to that. h/o left breast cancer and brain tumor with craniotomy and gamma knife treatment . bilateral mastectomy with last chemo 2 months ago. FIT doctor called for stroke eval. FS 94 code stroke called

## 2017-07-28 NOTE — H&P ADULT - NSHPPHYSICALEXAM_GEN_ALL_CORE
GENERAL APPEARANCE: Well developed, well nourished, alert and cooperative. NAD.   HEENT:  PERRLA, EOMI. External auditory canals normal, hearing grossly intact.  NECK: Neck supple, non-tender without lymphadenopathy, masses or thyromegaly.  CARDIAC: Normal S1 and S2. No S3, S4 or murmurs. Rhythm is regular.  LUNGS: Clear to auscultation and percussion without rales, rhonchi, wheezing or diminished breath sounds.  ABDOMEN: Positive bowel sounds. Soft, nondistended, nontender. No guarding or rebound.   MUSKULOSKELETAL: ROM intact spine and extremities. No joint erythema or tenderness.   BACK: normal gait and posture, no spinal deformity, symmetry of spinal muscles, without tenderness, decreased range of motion.  EXTREMITIES: No significant deformity or joint abnormality. No edema. Peripheral pulses intact. No varicosities.  NEUROLOGICAL: CN II-XII intact. 5/5 strength in all extremities. Sensation symmetric and intact throughout.   SKIN: Skin normal color, texture and turgor with no lesions or eruptions.  PSYCHIATRIC: AOx3.Normal affect and behavior.

## 2017-07-28 NOTE — H&P ADULT - PMH
Brachial neuritis  left  Breast cancer  left, mets to bone, lung, lymph nodes dx in 2014  mets to brain dx 2017

## 2017-07-28 NOTE — H&P ADULT - PROBLEM SELECTOR PLAN 1
-associated with blurry vision and flashing lights + n/v (likely secondary to worsening intracranial pressure) CT head consistent with worsening vasogenic edema in the right parietal occipital lobe 2/2 progression of brain mets  -now improved s/p steroids. Appreciate neuro/neurosurgery recs. Continue with decadron 4mg q6h  -MRI of the head to evaluate for metastatic disease  -neuro checks q4h  -PT consult -associated with blurry vision and flashing lights + n/v (likely secondary to worsening intracranial pressure) CT head consistent with worsening vasogenic edema in the right parietal occipital lobe 2/2 progression of brain mets  -now improved s/p steroids. Appreciate neuro/neurosurgery recs. Continue with decadron 4mg q6h  -MRI of the head to evaluate for metastatic disease  -neuro checks q4h  -if recurrence of vision changes, consider optho consult  -PT consult

## 2017-07-28 NOTE — ED PROVIDER NOTE - PROGRESS NOTE DETAILS
Dr. Duggan: Called to evaluate pt for Stroke Code. Pt has a history of Breast CA with previous brain mets removed by gamma knife presenting today for sudden difficulty walking and visual changes at 1357. Pt denies any headache, SOB, chest pain, or abd pain. 5/5 b/l UE and LE but with difficulty ambulating. PERRL. Stroke code called.

## 2017-07-28 NOTE — H&P ADULT - HISTORY OF PRESENT ILLNESS
56 y/o F with PMH breast cancer mets to bone, lung, and lymph nodes dx 3/2014 s/p bilateral mastectomy w/ LN dissection, chemotherapy and radiation therapy found to have metastasis to brain metastasis s/p craniotomy last week  and gamma knife treatment presents to the ED with gait instability and blurry vision. Pt reports around 2pm this afternoon she developed worsening headache located around the craniotomy site and right frontal area associated with flashing lights and blurry vision. When she attempted to stand, felt unsteady on her feet and was unable to walk without assistance. Also had 5 episodes of NBNB emesis. Denies weakness, numbness, dizziness, chest pain, back/neck pain, bowel/bladder incontinence, photophobia, fevers or chills. Currently reports resolution of vision changes and unsteady gait. Pt was discharged on the 7/12 following repair of craniotomy flap. Since then has had tenderness and pain in head around surgical site however without neurological deficits until today. Her last chemotherapy txt with G[facility] was 7-8 weeks ago.   In the ED, vital Signs Last 24 Hrs  T(C): 37.7 (28 Jul 2017 21:26), Max: 37.7 (28 Jul 2017 21:26)  T(F): 99.9 (28 Jul 2017 21:26), Max: 99.9 (28 Jul 2017 21:26)  HR: 86 (28 Jul 2017 21:26) (80 - 88)  BP: 122/82 (28 Jul 2017 21:26) (122/82 - 131/87)  BP(mean): 83 (28 Jul 2017 19:04) (83 - 83)  RR: 16 (28 Jul 2017 21:26) (16 - 20)  SpO2: 100% (28 Jul 2017 21:26) (98% - 100%)  CT head in the ED demonstrated worsening vasogenic edema. Pt was started on on 4mg dexamethasone q6h. 58 y/o F with PMH breast cancer mets to bone, lung, and lymph nodes dx 3/2014 s/p bilateral mastectomy w/ LN dissection, chemotherapy and radiation therapy found to have metastasis to brain s/p craniotomy last week  and gamma knife treatment presents to the ED with gait instability and blurry vision. Pt reports around 2pm this afternoon she developed worsening headache located around the craniotomy site and right frontal area associated with flashing lights and blurry vision. When she attempted to stand, felt unsteady on her feet and was unable to walk without assistance. Also had 5 episodes of NBNB emesis. Denies weakness, numbness, dizziness, chest pain, back/neck pain, bowel/bladder incontinence, photophobia, fevers or chills. Currently reports resolution of vision changes and unsteady gait. Pt was discharged on the 7/12 following repair of craniotomy flap. Since then has had tenderness and pain in head around surgical site however without neurological deficits until today. Her last chemotherapy txt with G[facility]ar was 7-8 weeks ago.   In the ED, vital Signs Last 24 Hrs  T(C): 37.7 (28 Jul 2017 21:26), Max: 37.7 (28 Jul 2017 21:26)  T(F): 99.9 (28 Jul 2017 21:26), Max: 99.9 (28 Jul 2017 21:26)  HR: 86 (28 Jul 2017 21:26) (80 - 88)  BP: 122/82 (28 Jul 2017 21:26) (122/82 - 131/87)  BP(mean): 83 (28 Jul 2017 19:04) (83 - 83)  RR: 16 (28 Jul 2017 21:26) (16 - 20)  SpO2: 100% (28 Jul 2017 21:26) (98% - 100%)  A stroke code was called. CT head in the ED demonstrated worsening vasogenic edema. Pt was started on on 4mg dexamethasone q6h.

## 2017-07-28 NOTE — CONSULT NOTE ADULT - ASSESSMENT
58 YO female with hx of metastatic brain lesion, s/p resection, chemo, and gamma knife with increased edema. No neurosurgical intervention is required at this time

## 2017-07-28 NOTE — H&P ADULT - NSHPLABSRESULTS_GEN_ALL_CORE
( @ 17:32)                      10.8  8.00 )-----------( 263                 33.9    Neutrophils = 6.13 (76.6%)  Lymphocytes = 1.41 (17.6%)  Eosinophils = 0.01 (0.1%)  Basophils = 0.02 (0.3%)  Monocytes = 0.40 (5.0%)  Bands = --%        145  |  104  |  10  ----------------------------<  127<H>  4.2   |  27  |  0.65    Ca    9.6      2017 17:32    TPro  7.7  /  Alb  4.5  /  TBili  0.2  /  DBili  x   /  AST  16  /  ALT  8   /  AlkPhos  59      ( 2017 17:32 )   PT: 10.7 SEC;   INR: 0.96 ;       PTT:33.5 SEC  CARDIAC MARKERS ( 2017 17:32 )  Trop < 0.06 ng/mL / CK 52 u/L / CKMB x           Urinalysis Basic - ( 2017 19:20 )    Color: PLYEL / Appearance: HAZY / S.011 / pH: 7.0  Gluc: NEGATIVE / Ketone: TRACE  / Bili: NEGATIVE / Urobili: NORMAL E.U.   Blood: TRACE / Protein: NEGATIVE / Nitrite: NEGATIVE   Leuk Esterase: LARGE / RBC: 2-5 / WBC 10-25   Sq Epi: MANY / Non Sq Epi: x / Bacteria: FEW    Labs reviewed. Stable anemia compared to baseline labs. Bacteruria   Imaging reviewed  CT head consistent with brain mets, vasogenic edema  Chest xray- clear lungs

## 2017-07-28 NOTE — ED PROVIDER NOTE - CARDIAC, MLM
Normal rate, regular rhythm.  Heart sounds S1, S2.  No murmurs, rubs or gallops. chemo port on right chest

## 2017-07-28 NOTE — CONSULT NOTE ADULT - SUBJECTIVE AND OBJECTIVE BOX
NEUROLOGY CONSULT     Patient is a 57y old  Female who presents with a chief complaint of     HPI: 6 y/o F with PMH breast cancer mets to brain, bone, lung, and lymph nodes dx 3/2014 s/p bilateral mastectomy and craniotomy and gamma knife by Dr. Estes, latest surgery 7/19 for craniotomy repair presents with unsteadiness, nausea and vomiting. Patient states she went to sleep and woke up at around 1:30 pm with nausea, vomiting and unstable gait. Endorses weakness throughout, but no focal weakness, also w/ headache. CTH showing increased vasogenic edema from prior CTH in right parieto occipital regions. NIHSS: 0      PAST MEDICAL & SURGICAL HISTORY:  Brachial neuritis: left  Breast cancer  Breast cancer: left, mets to bone, lung, lymph nodes dx in 2014  mets to brain dx 2017  H/O brain surgery: craniotomy 3/2017  H/O bilateral mastectomy  H/O bilateral mastectomy: 2014      Allergies    No Known Allergies    Intolerances        MEDICATIONS  (STANDING):  ondansetron Injectable 8 milliGRAM(s) IV Push Once  dexamethasone  Injectable 4 milliGRAM(s) IV Push Once  acetaminophen  IVPB. 1000 milliGRAM(s) IV Intermittent once  sodium chloride 0.9% Bolus 1000 milliLiter(s) IV Bolus once    MEDICATIONS  (PRN):      SOCIAL HISTORY: Denies tobacco/EtOH/drug use     FAMILY HISTORY:  No pertinent family history in first degree relatives      REVIEW OF SYSTEMS:  CONSTITUTIONAL:  No weight loss, fever, chills, weakness or fatigue.  HEENT:  Eyes:  No visual loss, blurred vision, double vision or yellow sclerae. Ears, Nose, Throat:  No hearing loss, sneezing, congestion, runny nose or sore throat.  SKIN:  No rash or itching.  CARDIOVASCULAR:  No chest pain, chest pressure or chest discomfort. No palpitations or edema.  RESPIRATORY:  No shortness of breath, cough or sputum.  GASTROINTESTINAL:  No anorexia, nausea, vomiting or diarrhea. No abdominal pain or blood.  GENITOURINARY:  denies incontinence/retention   NEUROLOGICAL:  No headache, dizziness, syncope, paralysis, ataxia, numbness or tingling in the extremities. No change in bowel or bladder control.  MUSCULOSKELETAL:  No muscle, back pain, joint pain or stiffness.  HEMATOLOGIC:  No anemia, bleeding or bruising.  LYMPHATICS:  No enlarged nodes. No history of splenectomy.  PSYCHIATRIC:  No history of depression or anxiety.  ENDOCRINOLOGIC:  No reports of sweating, cold or heat intolerance. No polyuria or polydipsia.      Vital Signs Last 24 Hrs  T(C): 36.7 (28 Jul 2017 16:53), Max: 36.7 (28 Jul 2017 16:53)  T(F): 98 (28 Jul 2017 16:53), Max: 98 (28 Jul 2017 16:53)  HR: 80 (28 Jul 2017 16:53) (80 - 80)  BP: 131/87 (28 Jul 2017 16:53) (131/87 - 131/87)  BP(mean): --  RR: 18 (28 Jul 2017 16:53) (18 - 18)  SpO2: 98% (28 Jul 2017 16:53) (98% - 98%)    PHYSICAL EXAM:   General appearance: appears uncomfortable, vomiting                  Mental Status: AAOx3, fluent speech, follows simple commands, able to name  Cranial Nerves: EOMI, PERRL, VFF, V1-V3 intact, facial symmetric,  tongue midline  Motor: strength 5/5 throughout. No drift x4  Sensation: Intact to LT throughout  Coordination: FTN intact b/l       LABS:              IMAGING:

## 2017-07-28 NOTE — H&P ADULT - PROBLEM SELECTOR PLAN 3
-SCDs given brain mets -no dysuria on exam, no indication to treat at this time  -monitor for signs of infection

## 2017-07-28 NOTE — ED ADULT NURSE NOTE - CHIEF COMPLAINT QUOTE
states" I am very disoriented , seeing flashes and disoriented since 157pm today" vomited x3 this afternoon. patient was fine prior to that. h/o left breast cancer and brain tumor with craniotomy and gamma knife treatment . bilateral mastectomy with last chemo 2 months ago. FIT doctor called for stroke eval. FS 94 code stroke called

## 2017-07-28 NOTE — CONSULT NOTE ADULT - ATTENDING COMMENTS
Seen and examined on rounds with housestaff.  She reports headache, nausea, gait instability s/p gamma knife surgery.  CT head and MRI brain reveal local vasogenic edema in the right occipital-parietal region.  C/w decadron and plan for surgical resection as per ZOË.

## 2017-07-28 NOTE — H&P ADULT - NSHPREVIEWOFSYSTEMS_GEN_ALL_CORE
CONSTITUTIONAL:  No weight loss, fever, chills, weakness or fatigue.  HEENT:  Eyes:  +blurry vision +flashing lights. No visual loss. Ears, Nose, Throat:  No hearing loss, sneezing, congestion, runny nose or sore throat.  SKIN:  No rash or itching.  CARDIOVASCULAR:  No chest pain, chest pressure or chest discomfort. No palpitations or edema.  RESPIRATORY:  No shortness of breath, cough or sputum.  GASTROINTESTINAL:  +nausea and vomiting. No anorexia or diarrhea. No abdominal pain or blood.  GENITOURINARY:  Denies hematuria, dysuria.   NEUROLOGICAL:  + headache +ataxia Denies dizziness, syncope, paralysis, numbness or tingling in the extremities. No change in bowel or bladder control.  MUSCULOSKELETAL:  No muscle, back pain, joint pain or stiffness.  HEMATOLOGIC:  No anemia, bleeding or bruising.  LYMPHATICS:  +lymphedema of left arm. No enlarged nodes. No history of splenectomy.  PSYCHIATRIC:  No history of depression or anxiety.  ENDOCRINOLOGIC:  No reports of sweating, cold or heat intolerance. No polyuria or polydipsia.  ALLERGIES:  No history of asthma, hives, eczema or rhinitis.

## 2017-07-28 NOTE — ED PROVIDER NOTE - CARE PLAN
Principal Discharge DX:	Brain edema  Instructions for follow-up, activity and diet:	dexamethasone , MRI

## 2017-07-28 NOTE — CONSULT NOTE ADULT - SUBJECTIVE AND OBJECTIVE BOX
58 y/o F with PMH breast cancer mets to bone, lung, and lymph nodes dx 3/2014 s/p bilateral mastectomy w/ LN dissection, chemotherapy and radiation therapy found to have metastasis to brain  s/p resection in March, revision of cranial flap this month and gamma knife treatment p/w  for sudden difficulty walking and visual changes at 1357. Code stroke called in ER. Pt. presenting with nausea and vomiting. Pt states she has had increased difficulty with ambulating today along with vomiting preceeded by seconds of nausea. she states this is similar to what she had prior to her 2nd craniotomy. She states she has a frontal headache    WDWN female in NAD  Vital Signs Last 24 Hrs  T(C): 36.7 (28 Jul 2017 17:36), Max: 36.7 (28 Jul 2017 16:53)  T(F): 98 (28 Jul 2017 17:36), Max: 98 (28 Jul 2017 16:53)  HR: 88 (28 Jul 2017 19:04) (80 - 88)  BP: 126/68 (28 Jul 2017 19:04) (126/68 - 131/87)  BP(mean): 83 (28 Jul 2017 19:04) (83 - 83)  RR: 16 (28 Jul 2017 19:04) (16 - 20)  SpO2: 100% (28 Jul 2017 19:04) (98% - 100%)    AAO X 3, speech clear and fluent  PERRLA, EOMI  CN 2-12 grossly intact  HSU. Left UE/LE 4+/5, Right UE/LE 5/5    Noncontrast brain CT: There has been a marked interval increase in the vasogenic edema in the  right parietal occipital temporal locations compared to the prior head CT  study. This most likely reflects progression of tumor. Radiation necrosis,  infection, or chemotherapy flare phenomenon could be considered in the  appropriate context.

## 2017-07-28 NOTE — CONSULT NOTE ADULT - ASSESSMENT
56 y/o F w/ breast CA w/ mets to brain s/p recent craniotomy repair 7/19 presenting w/ nausea, vomiting, headache and generalized weakness w/ CTH findings of increased vasogenic edema from prior CTH. Etiology could be tumor progression vs CNS infection vs radiation side effect.    Plan:   - MRI brain w/ and w/out contrast  - would not start steroids until MRI brain as she could have CNS infection, unless Neurosurgey strongly recommending it   - Neurosurgery eval

## 2017-07-28 NOTE — ED PROVIDER NOTE - MEDICAL DECISION MAKING DETAILS
58 y/o F with PMH breast cancer mets to bone, lung, and lymph nodes dx 3/2014 s/p bilateral mastectomy w/ LN dissection, chemotherapy and radiation therapy found to have metastasis to brain metastasis s/p craniotomy last week  and gamma knife treatment p/w  for sudden difficulty walking and visual changes at 1357.  ct head, cbc, cmp, ekg, zofran, ivf, decadron, neurosurgery

## 2017-07-28 NOTE — ED PROVIDER NOTE - ATTENDING CONTRIBUTION TO CARE
REDDY Attending Note - Dr. Ocasio    PE: pt is alert and oriented, perrl, ent normal, membranes are moist, neck supple. no lymphadenopathy or thyroid enlargement, No JVD.  Chest clear to P&A, Heart- reg rhythm without murmur, rubs or gallops, radial pulses equal bilaterally.  Abd is soft, non-tender, Bowel sounds are active. no mass or organomegaly. : No CVA tenderness. Neuro:  Pt alert and oriented x 3. Perrl    Distal neurosensory is intact. Motor function is 5/5 strength bilaterally negative drift.  constant chronic weakness of left hand..  No focal deficits. Extremities:  No edema.  Skin: warm and dry.

## 2017-07-28 NOTE — H&P ADULT - ATTENDING COMMENTS
Seen and examined . Feel fine and back to my baseline . left shoulder and arm which is a chronic issue since 1 year is also getting better . PT saw pt and no skilled needs. MRI results pending. Possible dc home. ID consulted as UA positive but no symptoms or fever or leucocytosis.

## 2017-07-28 NOTE — ED PROVIDER NOTE - OBJECTIVE STATEMENT
56 y/o F with PMH breast cancer mets to bone, lung, and lymph nodes dx 3/2014 s/p bilateral mastectomy w/ LN dissection, chemotherapy and radiation therapy found to have metastasis to brain metastasis s/p craniotomy last week  and gamma knife treatment p/w  for sudden difficulty walking and visual changes at 1357. Code stroke called in ER. Pt. presenting with nausea and vomiting 58 y/o F with PMH breast cancer mets to bone, lung, and lymph nodes dx 3/2014 s/p bilateral mastectomy w/ LN dissection, chemotherapy and radiation therapy found to have metastasis to brain metastasis s/p craniotomy last week  and gamma knife treatment p/w  for sudden difficulty walking and visual changes at 1357. Code stroke called in ER. Pt. presenting with nausea and vomiting. Pt states she has had increased difficulty with ambulating today along with vomiting preceeded by seconds of nausea. she states this is similar to what she had prior to her 2nd craniotomy. She states she has a frontal headache

## 2017-07-28 NOTE — ED PROVIDER NOTE - MUSCULOSKELETAL, MLM
Spine appears normal, range of motion is not limited, no muscle or joint tenderness. 5th digit decreased strenght . increased stiffness left hand

## 2017-07-28 NOTE — ED ADULT NURSE NOTE - OBJECTIVE STATEMENT
Past Medical History:  Brachial neuritis  left  Breast cancer  left, mets to bone, lung, lymph nodes dx in 2014  mets to brain dx 2017  Breast cancer.    Past Surgical History:  H/O bilateral mastectomy  2014  H/O bilateral mastectomy    H/O brain surgery  craniotomy 3/2017.  pt arrives with headache, and blurred vision around 2pm. recent craniotomy flap 7/11/17

## 2017-07-28 NOTE — H&P ADULT - PROBLEM SELECTOR PLAN 2
-poor prognosis given extensive metastatic disease  -contact outpatient oncologist in AM  -consider palliative care consult for GOC discussion  -Pain control

## 2017-07-29 DIAGNOSIS — R82.90 UNSPECIFIED ABNORMAL FINDINGS IN URINE: ICD-10-CM

## 2017-07-29 DIAGNOSIS — G93.9 DISORDER OF BRAIN, UNSPECIFIED: ICD-10-CM

## 2017-07-29 DIAGNOSIS — R82.71 BACTERIURIA: ICD-10-CM

## 2017-07-29 LAB
ALBUMIN SERPL ELPH-MCNC: 4.3 G/DL — SIGNIFICANT CHANGE UP (ref 3.3–5)
ALP SERPL-CCNC: 55 U/L — SIGNIFICANT CHANGE UP (ref 40–120)
ALT FLD-CCNC: 5 U/L — SIGNIFICANT CHANGE UP (ref 4–33)
AST SERPL-CCNC: 14 U/L — SIGNIFICANT CHANGE UP (ref 4–32)
BASOPHILS # BLD AUTO: 0 K/UL — SIGNIFICANT CHANGE UP (ref 0–0.2)
BASOPHILS NFR BLD AUTO: 0 % — SIGNIFICANT CHANGE UP (ref 0–2)
BILIRUB SERPL-MCNC: 0.3 MG/DL — SIGNIFICANT CHANGE UP (ref 0.2–1.2)
BUN SERPL-MCNC: 11 MG/DL — SIGNIFICANT CHANGE UP (ref 7–23)
CALCIUM SERPL-MCNC: 9.6 MG/DL — SIGNIFICANT CHANGE UP (ref 8.4–10.5)
CHLORIDE SERPL-SCNC: 104 MMOL/L — SIGNIFICANT CHANGE UP (ref 98–107)
CO2 SERPL-SCNC: 22 MMOL/L — SIGNIFICANT CHANGE UP (ref 22–31)
CREAT SERPL-MCNC: 0.67 MG/DL — SIGNIFICANT CHANGE UP (ref 0.5–1.3)
EOSINOPHIL # BLD AUTO: 0 K/UL — SIGNIFICANT CHANGE UP (ref 0–0.5)
EOSINOPHIL NFR BLD AUTO: 0 % — SIGNIFICANT CHANGE UP (ref 0–6)
GLUCOSE SERPL-MCNC: 190 MG/DL — HIGH (ref 70–99)
HCT VFR BLD CALC: 33.8 % — LOW (ref 34.5–45)
HGB BLD-MCNC: 10.9 G/DL — LOW (ref 11.5–15.5)
IMM GRANULOCYTES # BLD AUTO: 0.03 # — SIGNIFICANT CHANGE UP
IMM GRANULOCYTES NFR BLD AUTO: 0.7 % — SIGNIFICANT CHANGE UP (ref 0–1.5)
LYMPHOCYTES # BLD AUTO: 0.77 K/UL — LOW (ref 1–3.3)
LYMPHOCYTES # BLD AUTO: 16.8 % — SIGNIFICANT CHANGE UP (ref 13–44)
MCHC RBC-ENTMCNC: 30.7 PG — SIGNIFICANT CHANGE UP (ref 27–34)
MCHC RBC-ENTMCNC: 32.2 % — SIGNIFICANT CHANGE UP (ref 32–36)
MCV RBC AUTO: 95.2 FL — SIGNIFICANT CHANGE UP (ref 80–100)
MONOCYTES # BLD AUTO: 0.08 K/UL — SIGNIFICANT CHANGE UP (ref 0–0.9)
MONOCYTES NFR BLD AUTO: 1.7 % — LOW (ref 2–14)
NEUTROPHILS # BLD AUTO: 3.71 K/UL — SIGNIFICANT CHANGE UP (ref 1.8–7.4)
NEUTROPHILS NFR BLD AUTO: 80.8 % — HIGH (ref 43–77)
NRBC # FLD: 0 — SIGNIFICANT CHANGE UP
PLATELET # BLD AUTO: 279 K/UL — SIGNIFICANT CHANGE UP (ref 150–400)
PMV BLD: 9 FL — SIGNIFICANT CHANGE UP (ref 7–13)
POTASSIUM SERPL-MCNC: 3.9 MMOL/L — SIGNIFICANT CHANGE UP (ref 3.5–5.3)
POTASSIUM SERPL-SCNC: 3.9 MMOL/L — SIGNIFICANT CHANGE UP (ref 3.5–5.3)
PROT SERPL-MCNC: 7.6 G/DL — SIGNIFICANT CHANGE UP (ref 6–8.3)
RBC # BLD: 3.55 M/UL — LOW (ref 3.8–5.2)
RBC # FLD: 14.2 % — SIGNIFICANT CHANGE UP (ref 10.3–14.5)
SODIUM SERPL-SCNC: 142 MMOL/L — SIGNIFICANT CHANGE UP (ref 135–145)
WBC # BLD: 4.59 K/UL — SIGNIFICANT CHANGE UP (ref 3.8–10.5)
WBC # FLD AUTO: 4.59 K/UL — SIGNIFICANT CHANGE UP (ref 3.8–10.5)

## 2017-07-29 PROCEDURE — 70552 MRI BRAIN STEM W/DYE: CPT | Mod: 26

## 2017-07-29 PROCEDURE — 99222 1ST HOSP IP/OBS MODERATE 55: CPT

## 2017-07-29 PROCEDURE — 99222 1ST HOSP IP/OBS MODERATE 55: CPT | Mod: 24

## 2017-07-29 RX ORDER — HEPARIN SODIUM 5000 [USP'U]/ML
5000 INJECTION INTRAVENOUS; SUBCUTANEOUS EVERY 12 HOURS
Qty: 0 | Refills: 0 | Status: DISCONTINUED | OUTPATIENT
Start: 2017-07-29 | End: 2017-07-31

## 2017-07-29 RX ORDER — LEVETIRACETAM 250 MG/1
500 TABLET, FILM COATED ORAL
Qty: 0 | Refills: 0 | Status: DISCONTINUED | OUTPATIENT
Start: 2017-07-29 | End: 2017-07-31

## 2017-07-29 RX ADMIN — HEPARIN SODIUM 5000 UNIT(S): 5000 INJECTION INTRAVENOUS; SUBCUTANEOUS at 18:10

## 2017-07-29 RX ADMIN — Medication 4 MILLIGRAM(S): at 05:34

## 2017-07-29 RX ADMIN — GABAPENTIN 300 MILLIGRAM(S): 400 CAPSULE ORAL at 12:32

## 2017-07-29 RX ADMIN — Medication 650 MILLIGRAM(S): at 22:53

## 2017-07-29 RX ADMIN — Medication 4 MILLIGRAM(S): at 00:42

## 2017-07-29 RX ADMIN — Medication 4 MILLIGRAM(S): at 12:31

## 2017-07-29 RX ADMIN — Medication 650 MILLIGRAM(S): at 22:07

## 2017-07-29 RX ADMIN — Medication 650 MILLIGRAM(S): at 06:34

## 2017-07-29 RX ADMIN — LEVETIRACETAM 500 MILLIGRAM(S): 250 TABLET, FILM COATED ORAL at 18:10

## 2017-07-29 RX ADMIN — GABAPENTIN 300 MILLIGRAM(S): 400 CAPSULE ORAL at 05:34

## 2017-07-29 RX ADMIN — Medication 650 MILLIGRAM(S): at 05:34

## 2017-07-29 RX ADMIN — Medication 4 MILLIGRAM(S): at 18:10

## 2017-07-29 RX ADMIN — GABAPENTIN 300 MILLIGRAM(S): 400 CAPSULE ORAL at 22:07

## 2017-07-29 NOTE — CONSULT NOTE ADULT - SUBJECTIVE AND OBJECTIVE BOX
LUCY RODRIGUEZ 57y Female  MRN-[id number]    Patient is a 57y old  Female who presents with a chief complaint of ataxia, blurry vision (2017 22:57)      HPI:  58 y/o F with PMH breast cancer mets to bone, lung, and lymph nodes dx 3/2014 s/p bilateral mastectomy w/ LN dissection, chemotherapy and radiation therapy found to have metastasis to brain s/p craniotomy last week  and gamma knife treatment presents to the ED with gait instability and blurry vision. Pt reports around 2pm this afternoon she developed worsening headache located around the craniotomy site and right frontal area associated with flashing lights and blurry vision. When she attempted to stand, felt unsteady on her feet and was unable to walk without assistance. Also had 5 episodes of NBNB emesis. Denies weakness, numbness, dizziness, chest pain, back/neck pain, bowel/bladder incontinence, photophobia, fevers or chills. Currently reports resolution of vision changes and unsteady gait. Pt was discharged on the  following repair of craniotomy flap. Since then has had tenderness and pain in head around surgical site however without neurological deficits until today. Her last chemotherapy txt with [facility] was 7-8 weeks ago.   In the ED, vital Signs Last 24 Hrs  T(C): 37.7 (2017 21:26), Max: 37.7 (2017 21:26)  T(F): 99.9 (2017 21:26), Max: 99.9 (2017 21:26)  HR: 86 (2017 21:26) (80 - 88)  BP: 122/82 (2017 21:26) (122/82 - 131/87)  BP(mean): 83 (2017 19:04) (83 - 83)  RR: 16 (2017 21:26) (16 - 20)  SpO2: 100% (2017 21:26) (98% - 100%)  A stroke code was called. CT head in the ED demonstrated worsening vasogenic edema. Pt was started on on 4mg dexamethasone q6h. (2017 22:57)    feels better    Denies dyusria/hematuria/flank pain        PAST MEDICAL & SURGICAL HISTORY:  Brachial neuritis: left  Breast cancer: left, mets to bone, lung, lymph nodes dx in   mets to brain dx   H/O brain surgery: craniotomy 3/2017  H/O bilateral mastectomy  H/O bilateral mastectomy:       Allergies    No Known Allergies    Intolerances        ANTIMICROBIALS:      MEDICATIONS  (STANDING):  dexamethasone  Injectable 4 milliGRAM(s) IV Push every 6 hours  gabapentin 300 milliGRAM(s) Oral three times a day  levETIRAcetam 500 milliGRAM(s) Oral two times a day      Social History  Smoking: no  Etoh: no  Drug use: no      FAMILY HISTORY:  No pertinent family history in first degree relatives      Vital Signs Last 24 Hrs  T(C): 37.2 (2017 10:27), Max: 37.7 (2017 21:26)  T(F): 98.9 (2017 10:27), Max: 99.9 (2017 21:26)  HR: 84 (2017 10:27) (69 - 88)  BP: 114/68 (2017 10:27) (114/68 - 131/87)  BP(mean): 83 (2017 19:04) (83 - 83)  RR: 17 (2017 10:27) (16 - 20)  SpO2: 100% (2017 10:27) (98% - 100%)    CBC Full  -  ( 2017 11:35 )  WBC Count : 4.59 K/uL  Hemoglobin : 10.9 g/dL  Hematocrit : 33.8 %  Platelet Count - Automated : 279 K/uL  Mean Cell Volume : 95.2 fL  Mean Cell Hemoglobin : 30.7 pg  Mean Cell Hemoglobin Concentration : 32.2 %  Auto Neutrophil # : 3.71 K/uL  Auto Lymphocyte # : 0.77 K/uL  Auto Monocyte # : 0.08 K/uL  Auto Eosinophil # : 0.00 K/uL  Auto Basophil # : 0.00 K/uL  Auto Neutrophil % : 80.8 %  Auto Lymphocyte % : 16.8 %  Auto Monocyte % : 1.7 %  Auto Eosinophil % : 0.0 %  Auto Basophil % : 0.0 %        142  |  104  |  11  ----------------------------<  190<H>  3.9   |  22  |  0.67    Ca    9.6      2017 11:35    TPro  7.6  /  Alb  4.3  /  TBili  0.3  /  DBili  x   /  AST  14  /  ALT  5   /  AlkPhos  55  07-    LIVER FUNCTIONS - ( 2017 11:35 )  Alb: 4.3 g/dL / Pro: 7.6 g/dL / ALK PHOS: 55 u/L / ALT: 5 u/L / AST: 14 u/L / GGT: x           Urinalysis Basic - ( 2017 19:20 )    Color: PLYEL / Appearance: HAZY / S.011 / pH: 7.0  Gluc: NEGATIVE / Ketone: TRACE  / Bili: NEGATIVE / Urobili: NORMAL E.U.   Blood: TRACE / Protein: NEGATIVE / Nitrite: NEGATIVE   Leuk Esterase: LARGE / RBC: 2-5 / WBC 10-25   Sq Epi: MANY / Non Sq Epi: x / Bacteria: FEW        MICROBIOLOGY:        RADIOLOGY    MRI Head w/Cont (17 @ 10:35) >  Several enhancing masses within the right occipital region which appear   inseparable from the dura matter compatible with dural tumor recurrence.   Extensive associated vasogenic edema involving the right occipital and   parietal lobes. No midline shift or effacement of basal cisterns.    Few other subcentimeter enhancing lesions are stable or decreased in size   compared to prior study, as characterized above          Xray Chest 1 View AP- PORTABLE-Urgent (17 @ 17:54) >  The lungs are clear

## 2017-07-29 NOTE — PROGRESS NOTE ADULT - SUBJECTIVE AND OBJECTIVE BOX
Patient seen and examined with Dr Estes. MRI was recently completed this am. Patient presented with cerebral edema on CT head and underwent MRI today.   MRI unfortunately shows regrowth of tumor in the post operative bed. MRI was fully reviewed with patient and risk/benefits and different options were discussed. She is opting to have the tumor taken out. The plan for surgery is Wednesday of this week. Please obtain medical clearance and keep her on Decadron 4q6 IV for cerebral edema. Any worsening mental status or change in neurological exam call Neurosurgery.

## 2017-07-29 NOTE — CONSULT NOTE ADULT - ATTENDING COMMENTS
Brian Saul  Attending Physician   Division of Infectious Disease  Pager #[phone]  After 5pm/weekend #[phone]

## 2017-07-29 NOTE — CONSULT NOTE ADULT - ASSESSMENT
58 y/o F with PMH breast cancer mets to bone, lung, and lymph nodes dx 3/2014 s/p bilateral mastectomy w/ LN dissection, chemotherapy and radiation therapy found to have metastasis to brain s/p craniotomy last week  and gamma knife treatment presents to the ED with gait instability and blurry vision with abnormal urinalysis with brain masses on MRI

## 2017-07-29 NOTE — PHYSICAL THERAPY INITIAL EVALUATION ADULT - ACTIVE RANGE OF MOTION EXAMINATION, REHAB EVAL
Right UE Active ROM was WFL (within functional limits)/L shoulder flex 0-90 degrees, elbow and wrist wfl/bilateral  lower extremity Active ROM was WFL (within functional limits)

## 2017-07-29 NOTE — CONSULT NOTE ADULT - PROBLEM SELECTOR RECOMMENDATION 9
No surgical needs  Start dexamethasone 4Mg Q6H  Brain MRI with contrast to evaluate for recurrence of disease
-per Nsx  -on decadron

## 2017-07-30 DIAGNOSIS — G93.6 CEREBRAL EDEMA: ICD-10-CM

## 2017-07-30 DIAGNOSIS — M54.12 RADICULOPATHY, CERVICAL REGION: ICD-10-CM

## 2017-07-30 LAB
ALBUMIN SERPL ELPH-MCNC: 3.9 G/DL — SIGNIFICANT CHANGE UP (ref 3.3–5)
ALP SERPL-CCNC: 47 U/L — SIGNIFICANT CHANGE UP (ref 40–120)
ALT FLD-CCNC: 8 U/L — SIGNIFICANT CHANGE UP (ref 4–33)
APTT BLD: 25.9 SEC — LOW (ref 27.5–37.4)
AST SERPL-CCNC: 14 U/L — SIGNIFICANT CHANGE UP (ref 4–32)
BACTERIA UR CULT: SIGNIFICANT CHANGE UP
BILIRUB SERPL-MCNC: 0.2 MG/DL — SIGNIFICANT CHANGE UP (ref 0.2–1.2)
BLD GP AB SCN SERPL QL: NEGATIVE — SIGNIFICANT CHANGE UP
BUN SERPL-MCNC: 15 MG/DL — SIGNIFICANT CHANGE UP (ref 7–23)
CALCIUM SERPL-MCNC: 9.6 MG/DL — SIGNIFICANT CHANGE UP (ref 8.4–10.5)
CHLORIDE SERPL-SCNC: 105 MMOL/L — SIGNIFICANT CHANGE UP (ref 98–107)
CO2 SERPL-SCNC: 23 MMOL/L — SIGNIFICANT CHANGE UP (ref 22–31)
CREAT SERPL-MCNC: 0.66 MG/DL — SIGNIFICANT CHANGE UP (ref 0.5–1.3)
GLUCOSE SERPL-MCNC: 140 MG/DL — HIGH (ref 70–99)
HCT VFR BLD CALC: 33.1 % — LOW (ref 34.5–45)
HGB BLD-MCNC: 10.4 G/DL — LOW (ref 11.5–15.5)
INR BLD: 0.96 — SIGNIFICANT CHANGE UP (ref 0.88–1.17)
MCHC RBC-ENTMCNC: 29.7 PG — SIGNIFICANT CHANGE UP (ref 27–34)
MCHC RBC-ENTMCNC: 31.4 % — LOW (ref 32–36)
MCV RBC AUTO: 94.6 FL — SIGNIFICANT CHANGE UP (ref 80–100)
NRBC # FLD: 0 — SIGNIFICANT CHANGE UP
PLATELET # BLD AUTO: 284 K/UL — SIGNIFICANT CHANGE UP (ref 150–400)
PMV BLD: 9.1 FL — SIGNIFICANT CHANGE UP (ref 7–13)
POTASSIUM SERPL-MCNC: 4.2 MMOL/L — SIGNIFICANT CHANGE UP (ref 3.5–5.3)
POTASSIUM SERPL-SCNC: 4.2 MMOL/L — SIGNIFICANT CHANGE UP (ref 3.5–5.3)
PROT SERPL-MCNC: 6.5 G/DL — SIGNIFICANT CHANGE UP (ref 6–8.3)
PROTHROM AB SERPL-ACNC: 10.7 SEC — SIGNIFICANT CHANGE UP (ref 9.8–13.1)
RBC # BLD: 3.5 M/UL — LOW (ref 3.8–5.2)
RBC # FLD: 14.6 % — HIGH (ref 10.3–14.5)
RH IG SCN BLD-IMP: POSITIVE — SIGNIFICANT CHANGE UP
SODIUM SERPL-SCNC: 143 MMOL/L — SIGNIFICANT CHANGE UP (ref 135–145)
SPECIMEN SOURCE: SIGNIFICANT CHANGE UP
WBC # BLD: 7.88 K/UL — SIGNIFICANT CHANGE UP (ref 3.8–10.5)
WBC # FLD AUTO: 7.88 K/UL — SIGNIFICANT CHANGE UP (ref 3.8–10.5)

## 2017-07-30 PROCEDURE — 70552 MRI BRAIN STEM W/DYE: CPT | Mod: 26

## 2017-07-30 RX ORDER — DEXTROSE MONOHYDRATE, SODIUM CHLORIDE, AND POTASSIUM CHLORIDE 50; .745; 4.5 G/1000ML; G/1000ML; G/1000ML
1000 INJECTION, SOLUTION INTRAVENOUS
Qty: 0 | Refills: 0 | Status: DISCONTINUED | OUTPATIENT
Start: 2017-07-30 | End: 2017-07-31

## 2017-07-30 RX ADMIN — Medication 650 MILLIGRAM(S): at 22:00

## 2017-07-30 RX ADMIN — HEPARIN SODIUM 5000 UNIT(S): 5000 INJECTION INTRAVENOUS; SUBCUTANEOUS at 06:47

## 2017-07-30 RX ADMIN — Medication 4 MILLIGRAM(S): at 06:47

## 2017-07-30 RX ADMIN — Medication 650 MILLIGRAM(S): at 13:44

## 2017-07-30 RX ADMIN — Medication 4 MILLIGRAM(S): at 17:01

## 2017-07-30 RX ADMIN — Medication 650 MILLIGRAM(S): at 21:00

## 2017-07-30 RX ADMIN — Medication 4 MILLIGRAM(S): at 11:03

## 2017-07-30 RX ADMIN — Medication 650 MILLIGRAM(S): at 14:14

## 2017-07-30 RX ADMIN — GABAPENTIN 300 MILLIGRAM(S): 400 CAPSULE ORAL at 06:47

## 2017-07-30 RX ADMIN — Medication 4 MILLIGRAM(S): at 00:10

## 2017-07-30 RX ADMIN — Medication 4 MILLIGRAM(S): at 23:02

## 2017-07-30 RX ADMIN — GABAPENTIN 300 MILLIGRAM(S): 400 CAPSULE ORAL at 23:02

## 2017-07-30 RX ADMIN — LEVETIRACETAM 500 MILLIGRAM(S): 250 TABLET, FILM COATED ORAL at 17:01

## 2017-07-30 RX ADMIN — LEVETIRACETAM 500 MILLIGRAM(S): 250 TABLET, FILM COATED ORAL at 06:48

## 2017-07-30 RX ADMIN — GABAPENTIN 300 MILLIGRAM(S): 400 CAPSULE ORAL at 13:45

## 2017-07-30 NOTE — PROGRESS NOTE ADULT - SUBJECTIVE AND OBJECTIVE BOX
No issues overnight  Vital Signs Last 24 Hrs  T(C): 37.1 (30 Jul 2017 02:24), Max: 37.3 (29 Jul 2017 18:04)  T(F): 98.8 (30 Jul 2017 02:24), Max: 99.1 (29 Jul 2017 18:04)  HR: 70 (30 Jul 2017 02:24) (69 - 84)  BP: 120/71 (30 Jul 2017 02:24) (107/55 - 120/72)  BP(mean): --  RR: 16 (30 Jul 2017 02:24) (16 - 18)  SpO2: 98% (30 Jul 2017 02:24) (98% - 100%)    AAO X 3, speech clear and fluent  PERRLA, EOMI  CN 2-12 grossly intact  HSU. Left UE/LE 4+/5, Right UE/LE 5/5    MEDICATIONS  (STANDING):  dexamethasone  Injectable 4 milliGRAM(s) IV Push every 6 hours  gabapentin 300 milliGRAM(s) Oral three times a day  levETIRAcetam 500 milliGRAM(s) Oral two times a day  heparin  Injectable 5000 Unit(s) SubCutaneous every 12 hours    MEDICATIONS  (PRN):  oxyCODONE    IR 5 milliGRAM(s) Oral every 4 hours PRN Moderate Pain (4 - 6) and severe pain(7-10)  acetaminophen   Tablet. 650 milliGRAM(s) Oral every 6 hours PRN Mild Pain (1 - 3)

## 2017-07-30 NOTE — PROGRESS NOTE ADULT - SUBJECTIVE AND OBJECTIVE BOX
INTERVAL HPI/OVERNIGHT EVENTS: No new concern  Vital Signs Last 24 Hrs  T(C): 36.6 (2017 06:44), Max: 37.3 (2017 18:04)  T(F): 97.9 (2017 06:44), Max: 99.1 (2017 18:04)  HR: 68 (2017 06:44) (68 - 78)  BP: 111/69 (2017 06:44) (107/55 - 120/71)  BP(mean): --  RR: 17 (2017 06:44) (16 - 18)  SpO2: 100% (2017 06:44) (98% - 100%)  I&O's Summary    2017 07:01  -  2017 07:00  --------------------------------------------------------  IN: 0 mL / OUT: 400 mL / NET: -400 mL      MEDICATIONS  (STANDING):  dexamethasone  Injectable 4 milliGRAM(s) IV Push every 6 hours  gabapentin 300 milliGRAM(s) Oral three times a day  levETIRAcetam 500 milliGRAM(s) Oral two times a day  heparin  Injectable 5000 Unit(s) SubCutaneous every 12 hours  sodium chloride 0.9% with potassium chloride 20 mEq/L 1000 milliLiter(s) (75 mL/Hr) IV Continuous <Continuous>    MEDICATIONS  (PRN):  oxyCODONE    IR 5 milliGRAM(s) Oral every 4 hours PRN Moderate Pain (4 - 6) and severe pain(7-10)  acetaminophen   Tablet. 650 milliGRAM(s) Oral every 6 hours PRN Mild Pain (1 - 3)    LABS:                        10.4   7.88  )-----------( 284      ( 2017 08:20 )             33.1         143  |  105  |  15  ----------------------------<  140<H>  4.2   |  23  |  0.66    Ca    9.6      2017 06:00    TPro  6.5  /  Alb  3.9  /  TBili  0.2  /  DBili  x   /  AST  14  /  ALT  8   /  AlkPhos  47  07-30    PT/INR - ( 2017 08:20 )   PT: 10.7 SEC;   INR: 0.96          PTT - ( 2017 08:20 )  PTT:25.9 SEC  Urinalysis Basic - ( 2017 19:20 )    Color: PLYEL / Appearance: HAZY / S.011 / pH: 7.0  Gluc: NEGATIVE / Ketone: TRACE  / Bili: NEGATIVE / Urobili: NORMAL E.U.   Blood: TRACE / Protein: NEGATIVE / Nitrite: NEGATIVE   Leuk Esterase: LARGE / RBC: 2-5 / WBC 10-25   Sq Epi: MANY / Non Sq Epi: x / Bacteria: FEW      CAPILLARY BLOOD GLUCOSE            Urinalysis Basic - ( 2017 19:20 )    Color: PLYEL / Appearance: HAZY / S.011 / pH: 7.0  Gluc: NEGATIVE / Ketone: TRACE  / Bili: NEGATIVE / Urobili: NORMAL E.U.   Blood: TRACE / Protein: NEGATIVE / Nitrite: NEGATIVE   Leuk Esterase: LARGE / RBC: 2-5 / WBC 10-25   Sq Epi: MANY / Non Sq Epi: x / Bacteria: FEW      Consultant(s) Notes Reviewed:  [x ] YES  [ ] NO    PHYSICAL EXAM:  GENERAL: NAD, well-groomed, well-developed,not in any distress ,  HEAD:  Atraumatic, Normocephalic  EYES: EOMI, PERRLA, conjunctiva and sclera clear  ENMT: No tonsillar erythema, exudates, or enlargement; Moist mucous membranes, Good dentition, No lesions  NECK: Supple, No JVD, Normal thyroid  NERVOUS SYSTEM:  Alert & Oriented X3, left arm weakness    CHEST/LUNG: Good air entry bilateral with no  rales, rhonchi, wheezing, or rubs  HEART: Regular rate and rhythm; No murmurs, rubs, or gallops  ABDOMEN: Soft, Nontender, Nondistended; Bowel sounds present  EXTREMITIES:  2+ Peripheral Pulses, No clubbing, cyanosis, or edema  SKIN: No rashes or lesions    Care Discussed with Consultants/Other Providers [ x] YES  [ ] NO

## 2017-07-31 ENCOUNTER — TRANSCRIPTION ENCOUNTER (OUTPATIENT)
Age: 57
End: 2017-07-31

## 2017-07-31 ENCOUNTER — RESULT REVIEW (OUTPATIENT)
Age: 57
End: 2017-07-31

## 2017-07-31 LAB
ALBUMIN SERPL ELPH-MCNC: 3.9 G/DL — SIGNIFICANT CHANGE UP (ref 3.3–5)
ALP SERPL-CCNC: 45 U/L — SIGNIFICANT CHANGE UP (ref 40–120)
ALT FLD-CCNC: 7 U/L — SIGNIFICANT CHANGE UP (ref 4–33)
AST SERPL-CCNC: 11 U/L — SIGNIFICANT CHANGE UP (ref 4–32)
BASE EXCESS BLDA CALC-SCNC: 2.3 MMOL/L — SIGNIFICANT CHANGE UP
BASOPHILS # BLD AUTO: 0 K/UL — SIGNIFICANT CHANGE UP (ref 0–0.2)
BASOPHILS NFR BLD AUTO: 0 % — SIGNIFICANT CHANGE UP (ref 0–2)
BILIRUB SERPL-MCNC: < 0.2 MG/DL — LOW (ref 0.2–1.2)
BUN SERPL-MCNC: 18 MG/DL — SIGNIFICANT CHANGE UP (ref 7–23)
CA-I BLDA-SCNC: 1.26 MMOL/L — SIGNIFICANT CHANGE UP (ref 1.15–1.29)
CALCIUM SERPL-MCNC: 9.1 MG/DL — SIGNIFICANT CHANGE UP (ref 8.4–10.5)
CHLORIDE SERPL-SCNC: 104 MMOL/L — SIGNIFICANT CHANGE UP (ref 98–107)
CO2 SERPL-SCNC: 27 MMOL/L — SIGNIFICANT CHANGE UP (ref 22–31)
CREAT SERPL-MCNC: 0.61 MG/DL — SIGNIFICANT CHANGE UP (ref 0.5–1.3)
EOSINOPHIL # BLD AUTO: 0 K/UL — SIGNIFICANT CHANGE UP (ref 0–0.5)
EOSINOPHIL NFR BLD AUTO: 0 % — SIGNIFICANT CHANGE UP (ref 0–6)
GLUCOSE BLDA-MCNC: 136 MG/DL — HIGH (ref 70–99)
GLUCOSE SERPL-MCNC: 136 MG/DL — HIGH (ref 70–99)
HCO3 BLDA-SCNC: 26 MMOL/L — SIGNIFICANT CHANGE UP (ref 22–26)
HCT VFR BLD CALC: 30.1 % — LOW (ref 34.5–45)
HCT VFR BLDA CALC: 31 % — LOW (ref 34.5–46.5)
HGB BLD-MCNC: 9.7 G/DL — LOW (ref 11.5–15.5)
HGB BLDA-MCNC: 10 G/DL — LOW (ref 11.5–15.5)
IMM GRANULOCYTES # BLD AUTO: 0.06 # — SIGNIFICANT CHANGE UP
IMM GRANULOCYTES NFR BLD AUTO: 0.7 % — SIGNIFICANT CHANGE UP (ref 0–1.5)
LYMPHOCYTES # BLD AUTO: 1.04 K/UL — SIGNIFICANT CHANGE UP (ref 1–3.3)
LYMPHOCYTES # BLD AUTO: 12.9 % — LOW (ref 13–44)
MCHC RBC-ENTMCNC: 30.5 PG — SIGNIFICANT CHANGE UP (ref 27–34)
MCHC RBC-ENTMCNC: 32.2 % — SIGNIFICANT CHANGE UP (ref 32–36)
MCV RBC AUTO: 94.7 FL — SIGNIFICANT CHANGE UP (ref 80–100)
MONOCYTES # BLD AUTO: 0.29 K/UL — SIGNIFICANT CHANGE UP (ref 0–0.9)
MONOCYTES NFR BLD AUTO: 3.6 % — SIGNIFICANT CHANGE UP (ref 2–14)
NEUTROPHILS # BLD AUTO: 6.69 K/UL — SIGNIFICANT CHANGE UP (ref 1.8–7.4)
NEUTROPHILS NFR BLD AUTO: 82.8 % — HIGH (ref 43–77)
NRBC # FLD: 0 — SIGNIFICANT CHANGE UP
PCO2 BLDA: 41 MMHG — SIGNIFICANT CHANGE UP (ref 32–48)
PH BLDA: 7.43 PH — SIGNIFICANT CHANGE UP (ref 7.35–7.45)
PLATELET # BLD AUTO: 239 K/UL — SIGNIFICANT CHANGE UP (ref 150–400)
PMV BLD: 9.2 FL — SIGNIFICANT CHANGE UP (ref 7–13)
PO2 BLDA: 91 MMHG — SIGNIFICANT CHANGE UP (ref 83–108)
POTASSIUM BLDA-SCNC: 4 MMOL/L — SIGNIFICANT CHANGE UP (ref 3.4–4.5)
POTASSIUM SERPL-MCNC: 4.2 MMOL/L — SIGNIFICANT CHANGE UP (ref 3.5–5.3)
POTASSIUM SERPL-SCNC: 4.2 MMOL/L — SIGNIFICANT CHANGE UP (ref 3.5–5.3)
PROT SERPL-MCNC: 6.3 G/DL — SIGNIFICANT CHANGE UP (ref 6–8.3)
RBC # BLD: 3.18 M/UL — LOW (ref 3.8–5.2)
RBC # FLD: 14.4 % — SIGNIFICANT CHANGE UP (ref 10.3–14.5)
SAO2 % BLDA: 96.6 % — SIGNIFICANT CHANGE UP (ref 95–99)
SODIUM BLDA-SCNC: 139 MMOL/L — SIGNIFICANT CHANGE UP (ref 136–146)
SODIUM SERPL-SCNC: 141 MMOL/L — SIGNIFICANT CHANGE UP (ref 135–145)
WBC # BLD: 8.08 K/UL — SIGNIFICANT CHANGE UP (ref 3.8–10.5)
WBC # FLD AUTO: 8.08 K/UL — SIGNIFICANT CHANGE UP (ref 3.8–10.5)

## 2017-07-31 PROCEDURE — 88307 TISSUE EXAM BY PATHOLOGIST: CPT | Mod: 26

## 2017-07-31 PROCEDURE — 99223 1ST HOSP IP/OBS HIGH 75: CPT | Mod: GC

## 2017-07-31 PROCEDURE — 61781 SCAN PROC CRANIAL INTRA: CPT | Mod: 79

## 2017-07-31 PROCEDURE — 61510 CRNEC TREPH EXC BRN TUM STTL: CPT

## 2017-07-31 RX ORDER — SODIUM CHLORIDE 9 MG/ML
1000 INJECTION INTRAMUSCULAR; INTRAVENOUS; SUBCUTANEOUS
Qty: 0 | Refills: 0 | Status: DISCONTINUED | OUTPATIENT
Start: 2017-07-31 | End: 2017-08-01

## 2017-07-31 RX ORDER — OXYCODONE HYDROCHLORIDE 5 MG/1
10 TABLET ORAL EVERY 6 HOURS
Qty: 0 | Refills: 0 | Status: DISCONTINUED | OUTPATIENT
Start: 2017-07-31 | End: 2017-07-31

## 2017-07-31 RX ORDER — CEFAZOLIN SODIUM 1 G
1000 VIAL (EA) INJECTION EVERY 8 HOURS
Qty: 0 | Refills: 0 | Status: COMPLETED | OUTPATIENT
Start: 2017-07-31 | End: 2017-08-01

## 2017-07-31 RX ORDER — OXYCODONE HYDROCHLORIDE 5 MG/1
5 TABLET ORAL EVERY 6 HOURS
Qty: 0 | Refills: 0 | Status: DISCONTINUED | OUTPATIENT
Start: 2017-07-31 | End: 2017-08-02

## 2017-07-31 RX ORDER — ACETAMINOPHEN 500 MG
1000 TABLET ORAL ONCE
Qty: 0 | Refills: 0 | Status: DISCONTINUED | OUTPATIENT
Start: 2017-07-31 | End: 2017-07-31

## 2017-07-31 RX ORDER — DEXAMETHASONE 0.5 MG/5ML
4 ELIXIR ORAL EVERY 6 HOURS
Qty: 0 | Refills: 0 | Status: DISCONTINUED | OUTPATIENT
Start: 2017-07-31 | End: 2017-07-31

## 2017-07-31 RX ORDER — DEXAMETHASONE 0.5 MG/5ML
8 ELIXIR ORAL EVERY 8 HOURS
Qty: 0 | Refills: 0 | Status: DISCONTINUED | OUTPATIENT
Start: 2017-07-31 | End: 2017-08-02

## 2017-07-31 RX ORDER — DOCUSATE SODIUM 100 MG
100 CAPSULE ORAL THREE TIMES A DAY
Qty: 0 | Refills: 0 | Status: DISCONTINUED | OUTPATIENT
Start: 2017-07-31 | End: 2017-07-31

## 2017-07-31 RX ORDER — SODIUM CHLORIDE 9 MG/ML
1000 INJECTION INTRAMUSCULAR; INTRAVENOUS; SUBCUTANEOUS
Qty: 0 | Refills: 0 | Status: DISCONTINUED | OUTPATIENT
Start: 2017-07-31 | End: 2017-07-31

## 2017-07-31 RX ORDER — ACETAMINOPHEN 500 MG
650 TABLET ORAL EVERY 6 HOURS
Qty: 0 | Refills: 0 | Status: DISCONTINUED | OUTPATIENT
Start: 2017-07-31 | End: 2017-08-02

## 2017-07-31 RX ORDER — PANTOPRAZOLE SODIUM 20 MG/1
40 TABLET, DELAYED RELEASE ORAL DAILY
Qty: 0 | Refills: 0 | Status: DISCONTINUED | OUTPATIENT
Start: 2017-07-31 | End: 2017-07-31

## 2017-07-31 RX ORDER — OXYCODONE HYDROCHLORIDE 5 MG/1
5 TABLET ORAL EVERY 6 HOURS
Qty: 0 | Refills: 0 | Status: DISCONTINUED | OUTPATIENT
Start: 2017-07-31 | End: 2017-07-31

## 2017-07-31 RX ORDER — ACETAMINOPHEN 500 MG
650 TABLET ORAL EVERY 6 HOURS
Qty: 0 | Refills: 0 | Status: DISCONTINUED | OUTPATIENT
Start: 2017-07-31 | End: 2017-07-31

## 2017-07-31 RX ORDER — DOCUSATE SODIUM 100 MG
100 CAPSULE ORAL THREE TIMES A DAY
Qty: 0 | Refills: 0 | Status: DISCONTINUED | OUTPATIENT
Start: 2017-07-31 | End: 2017-08-02

## 2017-07-31 RX ADMIN — Medication 100 MILLIGRAM(S): at 17:17

## 2017-07-31 RX ADMIN — OXYCODONE HYDROCHLORIDE 5 MILLIGRAM(S): 5 TABLET ORAL at 23:33

## 2017-07-31 RX ADMIN — Medication 101.6 MILLIGRAM(S): at 21:56

## 2017-07-31 RX ADMIN — Medication 4 MILLIGRAM(S): at 05:36

## 2017-07-31 RX ADMIN — SODIUM CHLORIDE 50 MILLILITER(S): 9 INJECTION INTRAMUSCULAR; INTRAVENOUS; SUBCUTANEOUS at 17:18

## 2017-07-31 RX ADMIN — Medication 100 MILLIGRAM(S): at 21:31

## 2017-07-31 RX ADMIN — OXYCODONE HYDROCHLORIDE 10 MILLIGRAM(S): 5 TABLET ORAL at 21:29

## 2017-07-31 RX ADMIN — Medication 650 MILLIGRAM(S): at 05:35

## 2017-07-31 RX ADMIN — HEPARIN SODIUM 5000 UNIT(S): 5000 INJECTION INTRAVENOUS; SUBCUTANEOUS at 05:36

## 2017-07-31 RX ADMIN — Medication 650 MILLIGRAM(S): at 17:37

## 2017-07-31 RX ADMIN — GABAPENTIN 300 MILLIGRAM(S): 400 CAPSULE ORAL at 05:36

## 2017-07-31 RX ADMIN — DEXTROSE MONOHYDRATE, SODIUM CHLORIDE, AND POTASSIUM CHLORIDE 75 MILLILITER(S): 50; .745; 4.5 INJECTION, SOLUTION INTRAVENOUS at 00:00

## 2017-07-31 RX ADMIN — Medication 650 MILLIGRAM(S): at 18:30

## 2017-07-31 RX ADMIN — SODIUM CHLORIDE 80 MILLILITER(S): 9 INJECTION INTRAMUSCULAR; INTRAVENOUS; SUBCUTANEOUS at 14:16

## 2017-07-31 RX ADMIN — OXYCODONE HYDROCHLORIDE 10 MILLIGRAM(S): 5 TABLET ORAL at 22:00

## 2017-07-31 RX ADMIN — LEVETIRACETAM 500 MILLIGRAM(S): 250 TABLET, FILM COATED ORAL at 05:36

## 2017-07-31 NOTE — CONSULT NOTE ADULT - CONSULT REASON
Difficulty ambulating, headache, blurry vision with history of craniotomy for brain metastasis
High level of nursing care and close monitoring that can not be provided on the floor.
R/o UTI
This patient requires critical care
This patient requires critical care
unsteadiness, nausea and vomiting
This patient requires critical care

## 2017-07-31 NOTE — CONSULT NOTE ADULT - CONSULT REQUESTED DATE/TIME
28-Jul-2017 17:31
29-Jul-2017 12:56
31-Jul-2017 04:15
31-Jul-2017 12:30
31-Jul-2017 16:15
31-Jul-2017 16:15
28-Jul-2017 19:14

## 2017-07-31 NOTE — CONSULT NOTE ADULT - ATTENDING COMMENTS
(Continued from prior page)    She remains in the SICU where she is now seen.    BP		=	101 - 165/91 - 95  P		=	48 - 62			O2 Sat		= 100%  R		=	10 - 15			I/O		= 2,570 in/2,095 out (< 24 hours)  Temp		=	35.4 - 35.8    Glucose	=	-    Current wt	=	66.3 Kg  Max wt		=	66.3 Kg  Admit wt	=	65.8 Kg    BMI		=	27.4    Awake, alert, and fully oriented. In no distress. Non-toxic. Minimal complaints of a headache.  Drain with sanguinous output. Posterior / occipital dressing noted.  Pupils reactive with decreased vision from her left eye (reports blurry vision).  Anicteric.  No thrush. Oropharyngeal mucosa normal.  No JVD.  COR - RRR. No murmur.  Lungs clear with non-labored respirations. Symmetrical chest wall movements. Healed surgical scars from prior mastectomies. Right sided Mediport.  Abdomen soft and neither distended nor tender. No guarding or rebound. Cortes in place.  Extremities well perfused. Contractures of left fingers and complaints of chronic bilateral knee pain. Musculoskeletal exam otherwise normal.    Remains on:    1)	Clear liquid diet.  2)	IV NS @ 80 ml/hour  3)	Decadron 8 mg IV q8 hours  4)	Ancef 1 gram IV q 8 hours x 3 doses.  5)	Protonix 40 mg IVP q24 hours  6)	Colace 100 mg po tid    Assessment:	This patient is a 57-year-old right handed female who has metastatic (stage IV) breast cancer with metastasis to her lymph nodes, lung, bone and brain who is a few hours s/p occipital craniotomy and resection of metastasis who has asymptomatic bradycardia. She has normal respirations and if fully oriented without other signs of intra-crainial hypertension. She is now being given systemic steroids.    Plan to:    1)	Admit her to the SICU.  2)	Review imaging tests.  3)	Clarify medication orders - add indications to each medication.  4)	Lower the rate of parenteral fluid as she is getting an oral diet.  5)	Determine if she requires antiseizure prophylaxis (Keppra). Will discuss with the  	neurosurgeons.  6)	Plan to obtain repeat imaging tests in the morning of 8/1/17 as per the neurosurgeons.  	The patient is currently scheduled for an MRI of her brain. The patient and her spouse  	indicate that she was to have a CT scan of her chest, abdomen, and pelvis today  	(as an outpatient) and was reportedly told by her neurosurgeon that she would have  	these tests done as an inpatient. Unclear as to what the scans would be done for  	but will discuss with the neurosurgical team.  7)	No need for Protonix.  8)	Allow and assist her to be out of bed. Previously had a gait abnormality so will plan  	to consult the physical therapists.  9)	Remove the arterial cannula and Cortes catheter in the next 24 hours.  10)	Determine resuscitation status.  11)	Care discussed with the patient and her spouse.  12)	Full support in place    Terry Gerard  2 hours exclusive of procedures (Continued from prior page)    She remains in the SICU where she is now seen.    BP		=	101 - 165/91 - 95  P		=	48 - 62		O2 Sat		= 100%  R		=	10 - 15		I/O		= 2,570 in/2,095 out (< 24 hours)  Temp		=	35.4 - 35.8    Glucose	=	-    Current wt	=	66.3 Kg  Max wt		=	66.3 Kg  Admit wt	=	65.8 Kg    BMI		=	27.4    Awake, alert, and fully oriented. In no distress. Non-toxic. Minimal complaints of a headache.  Drain with sanguinous output. Posterior / occipital dressing noted.  Pupils reactive with decreased vision from her left eye (reports blurry vision).  Anicteric.  No thrush. Oropharyngeal mucosa normal.  No JVD.  COR - RRR. No murmur.  Lungs clear with non-labored respirations. Symmetrical chest wall movements. Healed surgical scars from prior mastectomies. Right sided Mediport.  Abdomen soft and neither distended nor tender. No guarding or rebound. Cortes in place.  Extremities well perfused. Contractures of left fingers and complaints of chronic bilateral knee pain. Musculoskeletal exam otherwise normal.    Remains on:    1)	Clear liquid diet.  2)	IV NS @ 80 ml/hour  3)	Decadron 8 mg IV q8 hours  4)	Ancef 1 gram IV q 8 hours x 3 doses.  5)	Protonix 40 mg IVP q24 hours  6)	Colace 100 mg po tid    Assessment:	This patient is a 57-year-old right handed female who has metastatic (stage IV) breast cancer with metastasis to her lymph nodes, lung, bone and brain who is a few hours s/p occipital craniotomy and resection of metastasis who has asymptomatic bradycardia. She has normal respirations and if fully oriented without other signs of intra-crainial hypertension. She is now being given systemic steroids.    Plan to:    1)	Admit her to the SICU.  2)	Review imaging tests.  3)	Clarify medication orders - add indications to each medication.  4)	Lower the rate of parenteral fluid as she is getting an oral diet.  5)	Determine if she requires antiseizure prophylaxis (Keppra). Will discuss with the  	neurosurgeons.  6)	Plan to obtain repeat imaging tests in the morning of 8/1/17 as per the neurosurgeons.  	The patient is currently scheduled for an MRI of her brain. The patient and her spouse  	indicate that she was to have a CT scan of her chest, abdomen, and pelvis today  	(as an outpatient) and was reportedly told by her neurosurgeon that she would have  	these tests done as an inpatient. Unclear as to what the scans would be done for  	but will discuss with the neurosurgical team.  7)	No need for Protonix.  8)	Allow and assist her to be out of bed. Previously had a gait abnormality so will plan  	to consult the physical therapists.  9)	Remove the arterial cannula and Cortes catheter in the next 24 hours.  10)	Determine resuscitation status.  11)	Care discussed with the patient and her spouse.  12)	Full support in place    Terry Gerard  2 hours exclusive of procedures (Continued from prior page)    She remains in the SICU where she is now seen.    BP		=	101 - 165/91 - 95  P		=	48 - 62		O2 Sat		= 100%  R		=	10 - 15		I/O		= 2,570 in/2,095 out (< 24 hours)  Temp		=	35.4 - 35.8    Glucose	=	-    Current wt	=	66.3 Kg  Max wt		=	66.3 Kg  Admit wt	=	65.8 Kg    BMI		=	27.4    Awake, alert, and fully oriented. In no distress. Non-toxic. Minimal complaints of a headache.  Drain with sanguinous output. Posterior / occipital dressing noted.  Pupils reactive with decreased vision from her left eye (reports blurry vision).  Anicteric.  No thrush. Oropharyngeal mucosa normal.  No JVD.  COR - RRR. No murmur.  Lungs clear with non-labored respirations. Symmetrical chest wall movements. Healed surgical scars from prior mastectomies. Right sided Mediport.  Abdomen soft and neither distended nor tender. No guarding or rebound. Cortes in place.  Extremities well perfused. Contractures of left fingers and complaints of chronic bilateral knee pain. Musculoskeletal exam otherwise normal.    Remains on:    1)	Clear liquid diet.  2)	IV NS @ 80 ml/hour  3)	Decadron 8 mg IV q8 hours  4)	Ancef 1 gram IV q 8 hours x 3 doses.  5)	Protonix 40 mg IVP q24 hours  6)	Colace 100 mg po tid    Assessment:	This patient is a 57-year-old right handed female who has metastatic (stage IV) breast cancer with metastasis to her lymph nodes, lung, bone and brain who is a few hours s/p occipital craniotomy and resection of metastasis who has asymptomatic bradycardia. She has normal respirations and if fully oriented without other signs of intra-crainial hypertension. She is now being given systemic steroids.    Plan to:    1)	Admit her to the SICU.  2)	Review imaging tests.  3)	Clarify medication orders - add indications to each medication.  4)	Lower the rate of parenteral fluid as she is getting an oral diet.  5)	Determine if she requires antiseizure prophylaxis (Keppra). Will discuss with the  	neurosurgeons.  6)	Plan to obtain repeat imaging tests in the morning of 8/1/17 as per the neurosurgeons.  	The patient is currently scheduled for an MRI of her brain. The patient and her spouse  	indicate that she was to have a CT scan of her chest, abdomen, and pelvis today  	(as an outpatient) and was reportedly told by her neurosurgeon that she would have  	these tests done as an inpatient. Unclear as to what the scans would be done for  	but will discuss with the neurosurgical team.  7)	No need for Protonix.  8)	Allow and assist her to be out of bed. Previously had a gait abnormality so will plan  	to consult the physical therapists.  9)	Resume the gabapentin soon.  10)	Remove the arterial cannula and Cortes catheter in the next 24 hours.  11)	Determine resuscitation status.  12)	Care discussed with the patient and her spouse.  13)	Full support in place    Terry Gerard  2 hours exclusive of procedures

## 2017-07-31 NOTE — CONSULT NOTE ADULT - ATTENDING COMMENTS
(Continued from above)     While on the Medical Service she had a:    			7/28 - 7/29 7/29 - 7/30 7/30 - 7/31  BP			107 - 120/55 - 75	107 - 124/55 - 76	108 - 136/62 - 76  P			69 - 84			68 - 84			65 - 84  R			16 - 18			16 - 18			16 - 18  Temp			36.6 - 37.3		36.4 - 37.2		36.4 - 37.1  O2 Sat			98% - 100%		98% - 100%		98% - 100%  Glucose		-			-			-  Intake			-			-			850 ml  Output			-			400 ml			300 ml  Balance		-			- 400 ml		+ 550 ml  Cumulative Balance	-			- 400 ml		+ 150 ml    While hospitalized she had a :    			7/28	7/29	7/30	7/30	7/31  			5:30	11:30	6	8:20	6:20  			PM	AM	AM	AM	AM  WBC		=	8	5	-	8	8  Neutro		=	77%	81%	-	-	83%  Hgb		=	11	11	-	10	10  Hct		=	34%	34%	-	33%	30%  Plts		=	263	279	-	284	239    PT		=	10.7	-	-	10.7	-  PTT		=	33.5	-	-	25.9	-  INR		=	0.96	-	-	0.96	-    Na		=	145	142	143	-	141  K		=	4.2	3.9	4.2	-	4.2  Cl		=	104	104	105	-	104  HCO3		=	27	22	23	-	27  Glucose	=	127	190	140	-	136  BUN		=	10	11	15	-	18  Creat		=	0.7	0.7	0.7	-	0.6    Bilirubin	=	0.2	0.3	0.2	-	< 0.2  Alk Phos	=	59	55	47	-	45  SGOT		=	16	14	14	-	11  SGPT		=	8	5	8	-	7    She underwent anesthesia and surgery commencing at approximately 9 AM on 7/31/17. She was ASA 4 and underwent general anesthesia via a 7.0 ETT that was easy to insert using a glidescope. A right radial arterial cannula was inserted for francisca-operative monitoring and a 14 Fr Cortes catheter was inserted. During the approximately 4 hours of anesthesia she was given 2.250 ml of LR and produced 1,500 ml of urine. She was estimated to have lost 100 ml of blood. Laboratory tests, obtained at 9:20 AM, during the anesthesia revealed a:    pH	=	7.43	Na		=	139	Hgb	=	10  pCO2	=	41	K		=	4.0	Hct	=	31%  pO2	=	91	HCO3		=	26  BE	=	2.3	Glucose	=	136    Prior to commencing the operation she was given 2 grams of ancef. She underwent a right occipital crainotomy and re-resection of metastatic brain tumor. On completion of the anesthesia and surgery her neuromusclar blockade was reversed (neostigmine / robinal) and she was extubated. Subsequently she was transferred to the SICU arriving in the SICU at approximately 12:40 PM.     (Continued below) (Continued from above)     While on the Medical Service she had a:    			7/28 - 7/29 7/29 - 7/30 7/30 - 7/31  BP			107 - 120/55 - 75	107 - 124/55 - 76	108 - 136/62 - 76  P			69 - 84			68 - 84			65 - 84  R			16 - 18			16 - 18			16 - 18  Temp			36.6 - 37.3		36.4 - 37.2		36.4 - 37.1  O2 Sat			98% - 100%		98% - 100%		98% - 100%  Glucose		-			-			-  Intake			-			-			850 ml  Output			-			400 ml			300 ml  Balance		-			- 400 ml		+ 550 ml  Cumulative Balance	-			- 400 ml		+ 150 ml    While hospitalized she had a :    			7/28	7/29	7/30	7/30	7/31  			5:30	11:30	6	8:20	6:20  			PM	AM	AM	AM	AM  WBC		=	8	5	-	8	8  Neutro		=	77%	81%	-	-	83%  Hgb		=	11	11	-	10	10  Hct		=	34%	34%	-	33%	30%  Plts		=	263	279	-	284	239    PT		=	10.7	-	-	10.7	-  PTT		=	33.5	-	-	25.9	-  INR		=	0.96	-	-	0.96	-    Na		=	145	142	143	-	141  K		=	4.2	3.9	4.2	-	4.2  Cl		=	104	104	105	-	104  HCO3		=	27	22	23	-	27  Glucose	=	127	190	140	-	136  BUN		=	10	11	15	-	18  Creat		=	0.7	0.7	0.7	-	0.6    Bilirubin		=	0.2	0.3	0.2	-	< 0.2  Alk Phos	=	59	55	47	-	45  SGOT		=	16	14	14	-	11  SGPT		=	8	5	8	-	7    She underwent anesthesia and surgery commencing at approximately 9 AM on 7/31/17. She was ASA 4 and underwent general anesthesia via a 7.0 ETT that was easy to insert using a glidescope. A right radial arterial cannula was inserted for francisca-operative monitoring and a 14 Fr Cortes catheter was inserted. During the approximately 4 hours of anesthesia she was given 2.250 ml of LR and produced 1,500 ml of urine. She was estimated to have lost 100 ml of blood. Laboratory tests, obtained at 9:20 AM, during the anesthesia revealed a:    pH	=	7.43	Na		=	139	Hgb	=	10  pCO2	=	41	K		=	4.0	Hct	=	31%  pO2	=	91	HCO3		=	26  BE	=	2.3	Glucose	=	136    Prior to commencing the operation she was given 2 grams of Ancef. She underwent a right occipital craniotomy and re-resection of metastatic brain tumor. On completion of the anesthesia and surgery her neuromuscular blockade was reversed (Neostigmine / Robinal) and she was extubated. Subsequently she was transferred to the SICU arriving in the SICU at approximately 12:40 PM.     (Continued below)

## 2017-07-31 NOTE — CONSULT NOTE ADULT - ASSESSMENT
A/P: This is a 57 year old female with recurrent brain metastasis, status post craniotomy and resection of right occipital brain metastasis.   1)   Admit to the surgical intensive care unit to provide a higher level of nursing care (neurological exam every 1 hour)   2)   Allow her to eat regular diet.   3)   Provide mechanical venous thromboembolism prophylaxis. We will start chemical venous thromboembolism prophylaxis tomorrow.   4)   Obtain a magnetic resonance imaging of the brain tomorrow.   5)   Treat her pain with non narcotic pain regimen Tylenol A/P: This is a 57 year old female with recurrent brain metastasis, status post craniotomy and resection of right occipital brain metastasis.   1)   Admit to the surgical intensive care unit to provide a higher level of nursing care (neurological exam every 1 hour)   2)   Allow her to eat regular diet.   3)   Provide mechanical venous thromboembolism prophylaxis. We will start chemical venous thromboembolism prophylaxis tomorrow.   4)   Obtain a magnetic resonance imaging of the brain tomorrow.   5)   Clarify with the neurosurgery team the need for antibiotics.    6)   Treat her pain with non narcotic pain regimen Tylenol

## 2017-07-31 NOTE — PROGRESS NOTE ADULT - SUBJECTIVE AND OBJECTIVE BOX
No issues overnight  Vital Signs Last 24 Hrs  T(C): 36.4 (30 Jul 2017 20:57), Max: 37.1 (30 Jul 2017 10:44)  T(F): 97.5 (30 Jul 2017 20:57), Max: 98.7 (30 Jul 2017 10:44)  HR: 69 (30 Jul 2017 20:57) (68 - 72)  BP: 119/76 (30 Jul 2017 20:57) (108/69 - 124/62)  BP(mean): --  RR: 18 (30 Jul 2017 20:57) (17 - 18)  SpO2: 100% (30 Jul 2017 20:57) (8% - 100%)    AAO X 3, speech clear and fluent  PERRLA, EOMI  CN 2-12 grossly intact  HSU. Left UE/LE 4+/5, Right UE/LE 5/5    MEDICATIONS  (STANDING):  dexamethasone  Injectable 4 milliGRAM(s) IV Push every 6 hours  gabapentin 300 milliGRAM(s) Oral three times a day  levETIRAcetam 500 milliGRAM(s) Oral two times a day  heparin  Injectable 5000 Unit(s) SubCutaneous every 12 hours  sodium chloride 0.9% with potassium chloride 20 mEq/L 1000 milliLiter(s) (75 mL/Hr) IV Continuous <Continuous>                          10.4   7.88  )-----------( 284      ( 30 Jul 2017 08:20 )             33.1   07-30    143  |  105  |  15  ----------------------------<  140<H>  4.2   |  23  |  0.66    Ca    9.6      30 Jul 2017 06:00    TPro  6.5  /  Alb  3.9  /  TBili  0.2  /  DBili  x   /  AST  14  /  ALT  8   /  AlkPhos  47  07-30    PT/INR - ( 30 Jul 2017 08:20 )   PT: 10.7 SEC;   INR: 0.96          PTT - ( 30 Jul 2017 08:20 )  PTT:25.9 SEC    Type and screen: O Pos    CXR: Clear lungs    EKG: NSR

## 2017-07-31 NOTE — CONSULT NOTE ADULT - SUBJECTIVE AND OBJECTIVE BOX
CRITICAL CARE CONSULT NOTE    Patient is a 57y old female who presents to the SICU after a craniotomy right occipital brain metastasis.       HPI:  58 y/o F with PMH breast cancer mets to bone, lung, and lymph nodes dx 3/2014 s/p bilateral mastectomy w/ LN dissection, chemotherapy and radiation therapy found to have metastasis to brain s/p craniotomy on 2017 and gamma knife treatment presents to the ED with gait instability and blurry vision. Pt reports around 2pm 3 days ago. She developed worsening headache located around the craniotomy site and right frontal area associated with flashing lights and blurry vision. When she attempted to stand, felt unsteady on her feet and was unable to walk without assistance. Also had 5 episodes of NBNB emesis. Denies weakness, numbness, dizziness, chest pain, back/neck pain, bowel/bladder incontinence, photophobia, fevers or chills. Currently reports resolution of vision changes and unsteady gait. Pt was discharged on the  following repair of craniotomy flap. Since then has had tenderness and pain in head around surgical site however without neurological deficits until today. Her last chemotherapy txt with [facility] was 7-8 weeks ago. She was admitted three days ago, underwent MRI of the brain which showed regrowth of tumor in the post operative bed. She was taken to the OR today for resection of brain metastasis. EBL was 100 ml, IVF 2.5 L, UOP: 1.5 L.       PAST MEDICAL & SURGICAL HISTORY:  Brachial neuritis: left  Breast cancer: left, mets to bone, lung, lymph nodes dx in   mets to brain dx   H/O brain surgery: craniotomy 3/2017  H/O bilateral mastectomy  H/O bilateral mastectomy:       FAMILY HISTORY:  No pertinent family history in first degree relatives      SOCIAL HISTORY:    MEDICATIONS  (STANDING):  sodium chloride 0.9%. 1000 milliLiter(s) (80 mL/Hr) IV Continuous <Continuous>  dexamethasone  Injectable 4 milliGRAM(s) IV Push every 6 hours  pantoprazole  Injectable 40 milliGRAM(s) IV Push daily  docusate sodium 100 milliGRAM(s) Oral three times a day  ceFAZolin   IVPB 1000 milliGRAM(s) IV Intermittent every 8 hours    MEDICATIONS  (PRN):    Allergies    Allergy Status Unknown    Intolerances        Vital Signs Last 24 Hrs  T(C): 35.8 (2017 12:40), Max: 36.7 (2017 18:00)  T(F): 96.4 (2017 12:40), Max: 98.1 (2017 18:00)  HR: 56 (2017 13:45) (48 - 84)  BP: 157/83 (2017 12:40) (119/68 - 157/83)  BP(mean): 103 (2017 12:40) (103 - 103)  RR: 11 (2017 13:45) (10 - 18)  SpO2: 100% (2017 13:45) (8% - 100%)  Daily Height in cm: 154.94 (2017 05:10)    Daily Weight in k.3 (2017 12:40)    AAO X 3, speech clear and fluent   PERRLA, EOMI  CN 2-12 grossly intact  Motor: Left UE/LE 4+/5, Right UE/LE 5/5                        9.7    8.08  )-----------( 239      ( 2017 06:20 )             30.1     07-31    141  |  104  |  18  ----------------------------<  136<H>  4.2   |  27  |  0.61    Ca    9.1      2017 06:20    TPro  6.3  /  Alb  3.9  /  TBili  < 0.2<L>  /  DBili  x   /  AST  11  /  ALT  7   /  AlkPhos  45  07-31    PT/INR - ( 2017 08:20 )   PT: 10.7 SEC;   INR: 0.96          PTT - ( 2017 08:20 )  PTT:25.9 SEC      IMAGING STUDIES: CRITICAL CARE CONSULT NOTE    Patient is a 57y old female who presents to the SICU after a craniotomy right occipital brain metastasis.       HPI:  58 y/o F with PMH breast cancer mets to bone, lung, and lymph nodes dx 3/2014 s/p bilateral mastectomy w/ LN dissection, chemotherapy and radiation therapy found to have metastasis to brain s/p craniotomy on 2017 and gamma knife treatment presents to the ED with gait instability and blurry vision. Pt reports around 2pm 3 days ago. She developed worsening headache located around the craniotomy site and right frontal area associated with flashing lights and blurry vision. When she attempted to stand, felt unsteady on her feet and was unable to walk without assistance. Also had 5 episodes of NBNB emesis. Denies weakness, numbness, dizziness, chest pain, back/neck pain, bowel/bladder incontinence, photophobia, fevers or chills. Currently reports resolution of vision changes and unsteady gait. Pt was discharged on the  following repair of craniotomy flap. Since then has had tenderness and pain in head around surgical site however without neurological deficits until today. Her last chemotherapy txt with [facility] was 7-8 weeks ago. She was admitted three days ago, underwent MRI of the brain which showed regrowth of tumor in the post operative bed. She was taken to the OR today for resection of brain metastasis. EBL was 100 ml, IVF 2.5 L, UOP: 1.5 L.       PAST MEDICAL & SURGICAL HISTORY:  Brachial neuritis: left  Breast cancer: left, mets to bone, lung, lymph nodes dx in   mets to brain dx   H/O brain surgery: craniotomy 3/2017  H/O bilateral mastectomy  H/O bilateral mastectomy:       FAMILY HISTORY:  No pertinent family history in first degree relatives      SOCIAL HISTORY:    MEDICATIONS  (STANDING):  sodium chloride 0.9%. 1000 milliLiter(s) (80 mL/Hr) IV Continuous <Continuous>  dexamethasone  Injectable 4 milliGRAM(s) IV Push every 6 hours  pantoprazole  Injectable 40 milliGRAM(s) IV Push daily  docusate sodium 100 milliGRAM(s) Oral three times a day  ceFAZolin   IVPB 1000 milliGRAM(s) IV Intermittent every 8 hours    MEDICATIONS  (PRN):    Allergies    Allergy Status Unknown    Intolerances        Vital Signs Last 24 Hrs  T(C): 35.8 (2017 12:40), Max: 36.7 (2017 18:00)  T(F): 96.4 (2017 12:40), Max: 98.1 (2017 18:00)  HR: 56 (2017 13:45) (48 - 84)  BP: 157/83 (2017 12:40) (119/68 - 157/83)  BP(mean): 103 (2017 12:40) (103 - 103)  RR: 11 (2017 13:45) (10 - 18)  SpO2: 100% (2017 13:45) (8% - 100%)  Daily Height in cm: 154.94 (2017 05:10)    Daily Weight in k.3 (2017 12:40)    AAO X 3, speech clear and fluent   PERRLA, EOMI. Dressing in right occiput clean and dry, subgaleal drain sanguinous.   CN 2-12 grossly intact  Motor: Left UE/LE 4+/5, Right UE/LE 5/5                        9.7    8.08  )-----------( 239      ( 2017 06:20 )             30.1     07-31    141  |  104  |  18  ----------------------------<  136<H>  4.2   |  27  |  0.61    Ca    9.1      2017 06:20    TPro  6.3  /  Alb  3.9  /  TBili  < 0.2<L>  /  DBili  x   /  AST  11  /  ALT  7   /  AlkPhos  45  07-31    PT/INR - ( 2017 08:20 )   PT: 10.7 SEC;   INR: 0.96          PTT - ( 2017 08:20 )  PTT:25.9 SEC      IMAGING STUDIES:

## 2017-07-31 NOTE — CONSULT NOTE ADULT - ATTENDING COMMENTS
July 31st, 2017    Hospital Day #3  Day of Surgery  Day of SICU Admission    Initial SICU Consultation    I was asked to evaluate this patient, provide a Surgical Critical Care consultation and to provide ongoing monitoring and treatment. The patient's chart is reviewed and she is examined.    This patietn is a 57-year-old female who presented to the ED at [facility] at approximatly 5 PM on 7/28/17 with complaints of being disoriented. She has had a worsenig gait abnormality and the disorientation began a few hours prior and was accompained with visual changes as well as emesis. When she presented to the ED a "Code Stroke" was activated and a CT scan and an MRI revealed a vasogenic (brain) edema in the right occipito-parietal region. It was thought that no neurosurgical intervention was requried at the time of hospitalization and she was admitted to the Medical Service.    She has a medical history significant for having metastatic breast cancer. She has had bilateral mastectomies in 2014 and had had adjuvent chemotherapy and radiation. She has had metastasis to the lymph nodes, lung, bone and brain and she has had a crainotomy in 2017 with subsequent gamma knife treatments.  She is known to have left bracheal neuritis. She was hospitalized on the Neurosurgery Service here at [facility] from 3/13/17 - 3/22/17 for evaluation and treatment of headaches. 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 [facility], numbness of her tongue, fingers, and toes, and swelling at her craintomy site. Apparently she was evaluated by the neurosurgeons and discharged to home with plans for her to commence gamma-knife treatments. She was evaluated in the ED at [facility] on 7/1/17 for complaints of having blurred vision and right sided headaches. The patient reported that the bone flap from her crainotomy had "popped" during Gamma Knife treatments and imaging tests revealed migration of the crainotomy flap with a depresed bone flap. She had a palpable, tender skull defect. She was evaluated by an ophthalmologist who recommeded outpatient visual field testing. Apparently plans were formulated for her to undergo elective revision of her crainotomy flap. She was hospitalized on the Neurosurgery Service here at [facility] from 7/12/17 - 7/13/17 for evaluation and treament of her depressed skull flap and on the day of hospitalizaiton underwent crainotomy and elevation of the depressed skull flap. She has had a mediport implanted. Prior to the current hospitalization she took:    1)	Gabapentin	2)	Oxycodone	3)	Tylenol		4)	Vitamin D    She has no known medication allergies and she is not allergic to latex. She does not abuse either ethanol or tobacco. She is  and resides with her spouse, Scot Deluca ([phone]), [facility]. There are three steps needed to enter the residence that is located on the 1st floor. She has an adult daughter, Laura Simmons ([phone]). The patient has previously designated her spouse, Scot Denise, to be her health care agent / proxy. The patient has a family history significant for a cousin who had seizures apparently due to anesthesia. She has home health aides 7 hours per day during weekdays and 6 hours per day on Saturdays. She has imparied vision and uses corrective lenses. She has had an abnormal gait wtih ataxia and commonly ambulates with the assistance of a cane or a rolling walker. She also has a wheelchair. She has had upper extremity parasthesias and she has had headaches. She has had weaknes and fatigue. She denied having had chest pain, dyspnea, or palpitations. She had anorexia, nausea, and emesis and denied having had abdominal pain. She has not had fever or chills, diarrhea or constipation and denied having had sick contacts or recent foreign travel. She has not had heat or cold intolerance. Her 10-point review of systems was otherwise negaitve. Her oncologist is Dr. Byrd.     On presentation to the ED she had a:    BP	=	131/87  P	=	80  R	=	18  Temp	=	36.7  O2 Sat	=	98%  VAS	=	5/10    She was awake, alert, and fully oriented. She was in no acute distress and she did not appear toxic. She had a GCS of 15.  She was anicteric. She had reactive pupils and her extr-occular movements were intact. She had a clean surgical site (Crainotomy).  She did not have thrush. She had normal oropharyngeal mucosa. Her tongue was midline.  She did not have JVD. There was no abnormal cervical adenopathy.  She had clear breath sounds bilaterally and she had non-labored respirations. She had symmetrical chest wall movements. There was a mediport in her right chest wall. She had healed surgical scars from prior mastectomies.  She had regular heart tones. She did not have a murmur.  Her abdomen was soft and neither distended nor tender. She did not have guarding or rebound. There were no palpable masses or abdominal wall hernias. She had active bowel sounds and there was no CVA tenderness.  She had well perfused extremities. She did not have a rash.  She had contractures of the left hand with decreased motion and sensation. Her musculoskeletal exam was otherwise normal.    A 20 gauge IV cannula was inserted into a vein of her right upper extremity and fluid was given. Laboratory tests revealed a:    WBC	=	8	Na		=	145	Bilirubin	=	0.2  Neutro	=	77%	K		=	4.2	Alk Phos	=	59  Hgb	=	11	Cl		=	104	SGOT		=	16  Hct	=	34%	HCO3		=	27	SGPT		=	8  Plts	=	263	Glucose	=	127  			BUN		=	10	CPK		=	52  PT	=	10.7	Creat		=	0.7	Troponin	<	0.06  PTT	=	33.5  INR	=	0.96	Albumin	=	4.5    Urine analysis  	Specific gravity	=	1.011  	WBC		=	10 - 25  	RBC		=	2 - 5  	Blood		=	Trace  	Ketone		=	Trace    An EKG revealed NSR.    A chest radiograph was clear    A CT scan of her brain revealed an increase in the right parietal-temporal-occipital vasogenic edema that was thought to represent progression of tumor.    An MRI of her brain revealed several enhancing masses in the right occipital region with extensive surrouiding vasogenic edema. There was effacement of the antrum and right lateral ventricules.     While she was in the ED she was given parenteral tylenol. She had a:    BP	=	122 - 131/68 - 87  P	=	80 - 86  R	=	16 - 20  Temp	=	36.7 - 37.7  O2 Sat	=	98% - 100%  VAS	=	3/10 - 5/10    She was admitted to the Medical Service and was given parenteral steroids and Keppra. Oxycodone and intravenous tylenol were given for pain and she was given protonix and colace. She was given a clear liquid diet. An ID consultation was obtained in order to assist in determing if she had a urinary tract infection. No antibiotics were given. The neurosurgeon evaluated the patient on 7/29/17 and indicated that there was recurrence of tumor. Plans were for the patient to undergo re-operation. Initally plans were for the patient to have the operation on 7/2/17 but subsequently plans were for her to have surgery on 7/31/17.    Continued below July 31st, 2017    Hospital Day #3  Day of Surgery  Day of SICU Admission    Initial SICU Consultation    I was asked to evaluate this patient, provide a Surgical Critical Care consultation and to provide ongoing monitoring and treatment. The patient's chart is reviewed and she is examined.    This patient is a 57-year-old female who presented to the ED at [facility] at approximately 5 PM on 7/28/17 with complaints of being disoriented. She has had a worsening gait abnormality and the disorientation began a few hours prior and was accompanied with visual changes as well as emesis. When she presented to the ED a "Code Stroke" was activated and a CT scan and an MRI revealed a vasogenic (brain) edema in the right occipito-parietal region. It was thought that no neurosurgical intervention was required at the time of hospitalization and she was admitted to the Medical Service.    She has a medical history significant for having metastatic breast cancer. She has had bilateral mastectomies in 2014 and had adjuvant chemotherapy and radiation. She has had metastasis to the lymph nodes, lung, bone and brain and she has had a craniotomy in 2017 with subsequent gamma knife treatments.  She is known to have left brachial neuritis. She was hospitalized on the Neurosurgery Service here at [facility] from 3/13/17 - 3/22/17 for evaluation and treatment of headaches. 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 On 4/6/17 she was evaluated in the ED at [facility] for complaints of headaches, numbness of her tongue, fingers, and toes, and swelling at her craniotomy site. Apparently she was evaluated by the neurosurgeons and discharged to home with plans for her to commence gamma-knife treatments. She was evaluated in the ED at [facility] on 7/1/17 for complaints of having blurred vision and right sided headaches. The patient reported that the bone flap from her craniotomy had "popped" during Gamma Knife treatments and imaging tests revealed migration of the craniotomy flap with a depressed bone flap. She had a palpable, tender skull defect. She was evaluated by an ophthalmologist who recommended outpatient visual field testing. Apparently plans were formulated for her to undergo elective revision of her craniotomy flap. She was hospitalized on the Neurosurgery Service here at [facility] from 7/12/17 - 7/13/17 for evaluation and treatment of her depressed skull flap and on the day of hospitalization underwent craniotomy and elevation of the depressed skull flap. She has had a Mediport implanted. She has had a normal spontaneous vaginal delivery. Prior to the current hospitalization she took:    1)	Gabapentin	2)	Oxycodone	3)	Tylenol		4)	Vitamin D    She has no known medication allergies and she is not allergic to latex. She does not abuse either ethanol or tobacco. She is  and resides with her spouse, Scot Deluca ([phone]), in a private residence in [facility]. There are three steps needed to enter the residence that is located on the 1st floor. She has an adult daughter, Laura Simmons ([phone]). The patient has two older siblings, a brother and a sister who are reportedly healthy. No family members have had breast cancer or uterine cancer. The patient has previously designated her spouse, Scot Denise, to be her health care agent / proxy. The patient has a family history significant for a cousin who had seizures apparently due to anesthesia. She has home health aides 7 hours per day during weekdays and 6 hours per day on Saturdays. She has impaired vision and uses corrective lenses. She has had an abnormal gait with ataxia and commonly ambulates with the assistance of a cane or a rolling walker. She also has a wheelchair. She has had upper extremity paresthesias and she has had headaches. She has had weakens and fatigue. She denied having had chest pain, dyspnea, or palpitations. She had anorexia, nausea, and emesis and denied having had abdominal pain. She has not had fever or chills, diarrhea or constipation and denied having had sick contacts or recent foreign travel. She has not had heat or cold intolerance. She has had bilateral knee pain and left upper extremity weakness and inability to move her fingers. Her 10-point review of systems was otherwise negative. Her oncologist is Dr. Byrd.     On presentation to the ED she had a:    BP	=	131/87  P	=	80  R	=	18  Temp	=	36.7  O2 Sat	=	98%  VAS	=	5/10    She was awake, alert, and fully oriented. She was in no acute distress and she did not appear toxic. She had a GCS of 15.  She was anicteric. She had reactive pupils and her extra-occular movements were intact. She had a clean surgical site (Craniotomy).  She did not have thrush. She had normal oropharyngeal mucosa. Her tongue was midline.  She did not have JVD. There was no abnormal cervical adenopathy.  She had clear breath sounds bilaterally and she had non-labored respirations. She had symmetrical chest wall movements. There was a Mediport in her right chest wall. She had healed surgical scars from prior mastectomies.  She had regular heart tones. She did not have a murmur.  Her abdomen was soft and neither distended nor tender. She did not have guarding or rebound. There were no palpable masses or abdominal wall hernias. She had active bowel sounds and there was no CVA tenderness.  She had well perfused extremities. She did not have a rash.  She had contractures of the left hand with decreased motion and sensation. Her musculoskeletal exam was otherwise normal.    A 20 gauge IV cannula was inserted into a vein of her right upper extremity and fluid was given. Laboratory tests revealed a:    WBC	=	8	Na		=	145	Bilirubin		=	0.2  Neutro	=	77%	K		=	4.2	Alk Phos	=	59  Hgb	=	11	Cl		=	104	SGOT		=	16  Hct	=	34%	HCO3		=	27	SGPT		=	8  Plts	=	263	Glucose	=	127  			BUN		=	10	CPK		=	52  PT	=	10.7	Creat		=	0.7	Troponin	<	0.06  PTT	=	33.5  INR	=	0.96	Albumin	=	4.5    Urine analysis  	Specific gravity	=	1.011  	WBC		=	10 - 25  	RBC		=	2 - 5  	Blood		=	Trace  	Ketone		=	Trace    An EKG revealed NSR.    A chest radiograph was clear    A CT scan of her brain revealed an increase in the right parietal-temporal-occipital vasogenic edema that was thought to represent progression of tumor.    An MRI of her brain revealed several enhancing masses in the right occipital region with extensive surrounding vasogenic edema. There was effacement of the antrum and right lateral ventricles.     While she was in the ED she was given parenteral Tylenol. She had a:    BP	=	122 - 131/68 - 87  P	=	80 - 86  R	=	16 - 20  Temp	=	36.7 - 37.7  O2 Sat	=	98% - 100%  VAS	=	3/10 - 5/10    She was admitted to the Medical Service and was given parenteral steroids and Keppra. Oxycodone and intravenous Tylenol were given for pain and she was given Protonix and colace. She was given a clear liquid diet. An ID consultation was obtained in order to assist in determining if she had a urinary tract infection. No antibiotics were given. The neurosurgeon evaluated the patient on 7/29/17 and indicated that there was recurrence of tumor. Plans were for the patient to undergo re-operation. Initially plans were for the patient to have the operation on 7/2/17 but subsequently plans were for her to have surgery on 7/31/17.    Continued below July 31st, 2017    Hospital Day #3  Day of Surgery  Day of SICU Admission    Initial SICU Consultation    I was asked to evaluate this patient, provide a Surgical Critical Care consultation and to provide ongoing monitoring and treatment. The patient's chart is reviewed and she is examined.    This patient is a 57-year-old female who presented to the ED at [facility] at approximately 5 PM on 7/28/17 with complaints of being disoriented. She has had a worsening gait abnormality and the disorientation began a few hours prior and was accompanied with visual changes as well as emesis. When she presented to the ED a "Code Stroke" was activated and a CT scan and an MRI revealed a vasogenic (brain) edema in the right occipito-parietal region. It was thought that no neurosurgical intervention was required at the time of hospitalization and she was admitted to the Medical Service.    She has a medical history significant for having metastatic breast cancer. She has had bilateral mastectomies in 2014 and had adjuvant chemotherapy and radiation. She has been found to be BRCA negative. She has had metastasis to the lymph nodes, lung, bone and brain and she has had a craniotomy in 2017 with subsequent gamma knife treatments.  She is known to have left brachial neuritis. She was hospitalized on the Neurosurgery Service here at [facility] from 3/13/17 - 3/22/17 for evaluation and treatment of headaches. 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 On 4/6/17 she was evaluated in the ED at [facility] for complaints of headaches, numbness of her tongue, fingers, and toes, and swelling at her craniotomy site. Apparently she was evaluated by the neurosurgeons and discharged to home with plans for her to commence gamma-knife treatments. She was evaluated in the ED at [facility] on 7/1/17 for complaints of having blurred vision and right sided headaches. The patient reported that the bone flap from her craniotomy had "popped" during Gamma Knife treatments and imaging tests revealed migration of the craniotomy flap with a depressed bone flap. She had a palpable, tender skull defect. She was evaluated by an ophthalmologist who recommended outpatient visual field testing. Apparently plans were formulated for her to undergo elective revision of her craniotomy flap. She was hospitalized on the Neurosurgery Service here at [facility] from 7/12/17 - 7/13/17 for evaluation and treatment of her depressed skull flap and on the day of hospitalization underwent craniotomy and elevation of the depressed skull flap. She has had a Mediport implanted. She has had a normal spontaneous vaginal delivery. Prior to the current hospitalization she took:    1)	Gabapentin	2)	Oxycodone	3)	Tylenol		4)	Vitamin D    She has no known medication allergies and she is not allergic to latex. She does not abuse either ethanol or tobacco. She is  and resides with her spouse, Scot Deluca ([phone]), in a private residence in [facility]. There are three steps needed to enter the residence that is located on the 1st floor. She has an adult daughter, Laura Simmons ([phone]). The patient has two older siblings, a brother and a sister who are reportedly healthy. No family members have had breast cancer or uterine cancer. The patient has previously designated her spouse, Scot Denise, to be her health care agent / proxy. The patient has a family history significant for a cousin who had seizures apparently due to anesthesia. She has home health aides 7 hours per day during weekdays and 6 hours per day on Saturdays. She has impaired vision and uses corrective lenses. She has had an abnormal gait with ataxia and commonly ambulates with the assistance of a cane or a rolling walker. She also has a wheelchair. She has had upper extremity paresthesias and she has had headaches. She has had weakens and fatigue. She denied having had chest pain, dyspnea, or palpitations. She had anorexia, nausea, and emesis and denied having had abdominal pain. She has not had fever or chills, diarrhea or constipation and denied having had sick contacts or recent foreign travel. She has not had heat or cold intolerance. She has had bilateral knee pain and left upper extremity weakness and inability to move her fingers. Her 10-point review of systems was otherwise negative. Her oncologist is Dr. Byrd.     On presentation to the ED she had a:    BP	=	131/87  P	=	80  R	=	18  Temp	=	36.7  O2 Sat	=	98%  VAS	=	5/10    She was awake, alert, and fully oriented. She was in no acute distress and she did not appear toxic. She had a GCS of 15.  She was anicteric. She had reactive pupils and her extra-occular movements were intact. She had a clean surgical site (Craniotomy).  She did not have thrush. She had normal oropharyngeal mucosa. Her tongue was midline.  She did not have JVD. There was no abnormal cervical adenopathy.  She had clear breath sounds bilaterally and she had non-labored respirations. She had symmetrical chest wall movements. There was a Mediport in her right chest wall. She had healed surgical scars from prior mastectomies.  She had regular heart tones. She did not have a murmur.  Her abdomen was soft and neither distended nor tender. She did not have guarding or rebound. There were no palpable masses or abdominal wall hernias. She had active bowel sounds and there was no CVA tenderness.  She had well perfused extremities. She did not have a rash.  She had contractures of the left hand with decreased motion and sensation. Her musculoskeletal exam was otherwise normal.    A 20 gauge IV cannula was inserted into a vein of her right upper extremity and fluid was given. Laboratory tests revealed a:    WBC	=	8	Na		=	145	Bilirubin		=	0.2  Neutro	=	77%	K		=	4.2	Alk Phos	=	59  Hgb	=	11	Cl		=	104	SGOT		=	16  Hct	=	34%	HCO3		=	27	SGPT		=	8  Plts	=	263	Glucose	=	127  			BUN		=	10	CPK		=	52  PT	=	10.7	Creat		=	0.7	Troponin	<	0.06  PTT	=	33.5  INR	=	0.96	Albumin	=	4.5    Urine analysis  	Specific gravity	=	1.011  	WBC		=	10 - 25  	RBC		=	2 - 5  	Blood		=	Trace  	Ketone		=	Trace    An EKG revealed NSR.    A chest radiograph was clear    A CT scan of her brain revealed an increase in the right parietal-temporal-occipital vasogenic edema that was thought to represent progression of tumor.    An MRI of her brain revealed several enhancing masses in the right occipital region with extensive surrounding vasogenic edema. There was effacement of the antrum and right lateral ventricles.     While she was in the ED she was given parenteral Tylenol. She had a:    BP	=	122 - 131/68 - 87  P	=	80 - 86  R	=	16 - 20  Temp	=	36.7 - 37.7  O2 Sat	=	98% - 100%  VAS	=	3/10 - 5/10    She was admitted to the Medical Service and was given parenteral steroids and Keppra. Oxycodone and intravenous Tylenol were given for pain and she was given Protonix and colace. She was given a clear liquid diet. An ID consultation was obtained in order to assist in determining if she had a urinary tract infection. No antibiotics were given. The neurosurgeon evaluated the patient on 7/29/17 and indicated that there was recurrence of tumor. Plans were for the patient to undergo re-operation. Initially plans were for the patient to have the operation on 7/2/17 but subsequently plans were for her to have surgery on 7/31/17.    Continued below

## 2017-07-31 NOTE — PROGRESS NOTE ADULT - SUBJECTIVE AND OBJECTIVE BOX
LUCY RODRIGUEZ 57y, Female  POC- s/p right occipital craniotomy for metastasis  Patient reports Headache 6/10, also reports left visual field cut maybe minimally worse, she denies any other complaints.  SHERI drain with minimal output. Cortes to gravity , 480cc since O.R.    HPI:  58 y/o F with PMH breast cancer mets to bone, lung, and lymph nodes dx 3/2014 s/p bilateral mastectomy w/ LN dissection, chemotherapy and radiation therapy found to have metastasis to brain s/p craniotomy last week  and gamma knife treatment presents to the ED with gait instability and blurry vision. Pt reports around 2pm this afternoon she developed worsening headache located around the craniotomy site and right frontal area associated with flashing lights and blurry vision. When she attempted to stand, felt unsteady on her feet and was unable to walk without assistance. Also had 5 episodes of NBNB emesis. Denies weakness, numbness, dizziness, chest pain, back/neck pain, bowel/bladder incontinence, photophobia, fevers or chills. Currently reports resolution of vision changes and unsteady gait. Pt was discharged on the 7/12 following repair of craniotomy flap. Since then has had tenderness and pain in head around surgical site however without neurological deficits until today. Her last chemotherapy txt with [facility] was 7-8 weeks ago.   In the ED, vital Signs Last 24 Hrs    T(C): 37.7 (28 Jul 2017 21:26), Max: 37.7 (28 Jul 2017 21:26)  T(F): 99.9 (28 Jul 2017 21:26), Max: 99.9 (28 Jul 2017 21:26)  HR: 86 (28 Jul 2017 21:26) (80 - 88)  BP: 122/82 (28 Jul 2017 21:26) (122/82 - 131/87)  BP(mean): 83 (28 Jul 2017 19:04) (83 - 83)  RR: 16 (28 Jul 2017 21:26) (16 - 20)  SpO2: 100% (28 Jul 2017 21:26) (98% - 100%)  A stroke code was called. CT head in the ED demonstrated worsening vasogenic edema. Pt was started on on 4mg dexamethasone q6h. (28 Jul 2017 22:57)    PAST MEDICAL & SURGICAL HISTORY:  Brachial neuritis: left  Breast cancer: left, mets to bone, lung, lymph nodes dx in 2014  mets to brain dx 2017  H/O brain surgery: craniotomy 3/2017  H/O bilateral mastectomy  H/O bilateral mastectomy: 2014    Vital Signs Last 24 Hrs  T(C): 35.8 (31 Jul 2017 12:40), Max: 36.7 (30 Jul 2017 18:00)  T(F): 96.4 (31 Jul 2017 12:40), Max: 98.1 (30 Jul 2017 18:00)  HR: 53 (31 Jul 2017 14:00) (48 - 84)  BP: 157/83 (31 Jul 2017 12:40) (119/68 - 157/83)  BP(mean): 103 (31 Jul 2017 12:40) (103 - 103)  RR: 14 (31 Jul 2017 14:00) (10 - 18)  SpO2: 100% (31 Jul 2017 14:00) (98% - 100%)  I&O's Summary    30 Jul 2017 07:01  -  31 Jul 2017 07:00  --------------------------------------------------------  IN: 850 mL / OUT: 300 mL / NET: 550 mL    31 Jul 2017 07:01  -  31 Jul 2017 15:03  --------------------------------------------------------  IN: 160 mL / OUT: 420 mL / NET: -260 mL        PHYSICAL EXAM:  GENERAL:  AA& 0 x 3, Cranial Nerves 2-12 grossly intact, speach clear, follows commands, PERRL  Motor- Strength : 5/5 BLE, RUE, LUE : proximal 5/5, distal 4/5  Sensory- intact to light touch throughout  incision site: C/D/I  No drift    MEDICATIONS  (STANDING):  sodium chloride 0.9%. 1000 milliLiter(s) (80 mL/Hr) IV Continuous <Continuous>  pantoprazole  Injectable 40 milliGRAM(s) IV Push daily  ceFAZolin   IVPB 1000 milliGRAM(s) IV Intermittent every 8 hours  dexamethasone  IVPB 8 milliGRAM(s) IV Intermittent every 8 hours  acetaminophen  IVPB. 1000 milliGRAM(s) IV Intermittent once  docusate sodium 100 milliGRAM(s) Oral three times a day    MEDICATIONS  (PRN):      LABS:                        9.7    8.08  )-----------( 239      ( 31 Jul 2017 06:20 )             30.1     07-31    141  |  104  |  18  ----------------------------<  136<H>  4.2   |  27  |  0.61    Ca    9.1      31 Jul 2017 06:20    TPro  6.3  /  Alb  3.9  /  TBili  < 0.2<L>  /  DBili  x   /  AST  11  /  ALT  7   /  AlkPhos  45  07-31    PT/INR - ( 30 Jul 2017 08:20 )   PT: 10.7 SEC;   INR: 0.96          PTT - ( 30 Jul 2017 08:20 )  PTT:25.9 SEC

## 2017-08-01 LAB
BUN SERPL-MCNC: 12 MG/DL — SIGNIFICANT CHANGE UP (ref 7–23)
CALCIUM SERPL-MCNC: 8.4 MG/DL — SIGNIFICANT CHANGE UP (ref 8.4–10.5)
CHLORIDE SERPL-SCNC: 104 MMOL/L — SIGNIFICANT CHANGE UP (ref 98–107)
CO2 SERPL-SCNC: 26 MMOL/L — SIGNIFICANT CHANGE UP (ref 22–31)
CREAT SERPL-MCNC: 0.63 MG/DL — SIGNIFICANT CHANGE UP (ref 0.5–1.3)
GLUCOSE SERPL-MCNC: 147 MG/DL — HIGH (ref 70–99)
HCT VFR BLD CALC: 28.6 % — LOW (ref 34.5–45)
HGB BLD-MCNC: 9 G/DL — LOW (ref 11.5–15.5)
MCHC RBC-ENTMCNC: 30 PG — SIGNIFICANT CHANGE UP (ref 27–34)
MCHC RBC-ENTMCNC: 31.5 % — LOW (ref 32–36)
MCV RBC AUTO: 95.3 FL — SIGNIFICANT CHANGE UP (ref 80–100)
NRBC # FLD: 0 — SIGNIFICANT CHANGE UP
PLATELET # BLD AUTO: 220 K/UL — SIGNIFICANT CHANGE UP (ref 150–400)
PMV BLD: 9.3 FL — SIGNIFICANT CHANGE UP (ref 7–13)
POTASSIUM SERPL-MCNC: 4.2 MMOL/L — SIGNIFICANT CHANGE UP (ref 3.5–5.3)
POTASSIUM SERPL-SCNC: 4.2 MMOL/L — SIGNIFICANT CHANGE UP (ref 3.5–5.3)
RBC # BLD: 3 M/UL — LOW (ref 3.8–5.2)
RBC # FLD: 14.3 % — SIGNIFICANT CHANGE UP (ref 10.3–14.5)
SODIUM SERPL-SCNC: 142 MMOL/L — SIGNIFICANT CHANGE UP (ref 135–145)
WBC # BLD: 7.52 K/UL — SIGNIFICANT CHANGE UP (ref 3.8–10.5)
WBC # FLD AUTO: 7.52 K/UL — SIGNIFICANT CHANGE UP (ref 3.8–10.5)

## 2017-08-01 PROCEDURE — 70553 MRI BRAIN STEM W/O & W/DYE: CPT | Mod: 26

## 2017-08-01 PROCEDURE — 70450 CT HEAD/BRAIN W/O DYE: CPT | Mod: 26

## 2017-08-01 PROCEDURE — 99233 SBSQ HOSP IP/OBS HIGH 50: CPT | Mod: GC

## 2017-08-01 RX ORDER — ENOXAPARIN SODIUM 100 MG/ML
40 INJECTION SUBCUTANEOUS DAILY
Qty: 0 | Refills: 0 | Status: DISCONTINUED | OUTPATIENT
Start: 2017-08-01 | End: 2017-08-02

## 2017-08-01 RX ADMIN — OXYCODONE HYDROCHLORIDE 5 MILLIGRAM(S): 5 TABLET ORAL at 00:00

## 2017-08-01 RX ADMIN — Medication 101.6 MILLIGRAM(S): at 06:29

## 2017-08-01 RX ADMIN — Medication 650 MILLIGRAM(S): at 16:26

## 2017-08-01 RX ADMIN — OXYCODONE HYDROCHLORIDE 5 MILLIGRAM(S): 5 TABLET ORAL at 06:27

## 2017-08-01 RX ADMIN — Medication 100 MILLIGRAM(S): at 05:41

## 2017-08-01 RX ADMIN — SODIUM CHLORIDE 50 MILLILITER(S): 9 INJECTION INTRAMUSCULAR; INTRAVENOUS; SUBCUTANEOUS at 05:44

## 2017-08-01 RX ADMIN — Medication 100 MILLIGRAM(S): at 21:14

## 2017-08-01 RX ADMIN — Medication 100 MILLIGRAM(S): at 09:37

## 2017-08-01 RX ADMIN — Medication 101.6 MILLIGRAM(S): at 21:14

## 2017-08-01 RX ADMIN — Medication 100 MILLIGRAM(S): at 01:06

## 2017-08-01 RX ADMIN — OXYCODONE HYDROCHLORIDE 5 MILLIGRAM(S): 5 TABLET ORAL at 05:41

## 2017-08-01 RX ADMIN — Medication 101.6 MILLIGRAM(S): at 13:57

## 2017-08-01 RX ADMIN — SODIUM CHLORIDE 50 MILLILITER(S): 9 INJECTION INTRAMUSCULAR; INTRAVENOUS; SUBCUTANEOUS at 07:24

## 2017-08-01 RX ADMIN — Medication 650 MILLIGRAM(S): at 15:56

## 2017-08-01 RX ADMIN — Medication 100 MILLIGRAM(S): at 13:57

## 2017-08-01 RX ADMIN — ENOXAPARIN SODIUM 40 MILLIGRAM(S): 100 INJECTION SUBCUTANEOUS at 18:38

## 2017-08-01 NOTE — PROGRESS NOTE ADULT - SUBJECTIVE AND OBJECTIVE BOX
S: This patient was admitted to the surgical intensive care unit for higher level of nursing care after a craniotomy.   She had no acute events overnight.       Vital Signs Last 24 Hrs  T(C): 36.2 (01 Aug 2017 00:00), Max: 36.7 (31 Jul 2017 08:19)  T(F): 97.1 (01 Aug 2017 00:00), Max: 98.1 (31 Jul 2017 08:19)  HR: 56 (01 Aug 2017 00:00) (48 - 84)  BP: 118/65 (31 Jul 2017 21:00) (118/65 - 157/83)  BP(mean): 79 (31 Jul 2017 21:00) (79 - 103)  RR: 13 (01 Aug 2017 00:00) (9 - 18)  SpO2: 100% (01 Aug 2017 00:00) (100% - 100%)      30 Jul 2017 07:01  -  31 Jul 2017 07:00  --------------------------------------------------------  IN:    Oral Fluid: 850 mL  Total IN: 850 mL    OUT:    Voided: 300 mL  Total OUT: 300 mL    Total NET: 550 mL      31 Jul 2017 07:01  -  01 Aug 2017 01:41  --------------------------------------------------------  IN:    IV PiggyBack: 100 mL    Lactated Ringers IV Bolus: 2250 mL    Oral Fluid: 300 mL    sodium chloride 0.9%: 320 mL    sodium chloride 0.9%.: 450 mL  Total IN: 3420 mL    OUT:    Drain: 35 mL    Estimated Blood Loss: 100 mL    Indwelling Catheter - Urethral: 2790 mL  Total OUT: 2925 mL    Total NET: 495 mL    Current wt	=	66.3 Kg    BMI		=	27.4    Awake, alert, and fully oriented. In no distress. Non-toxic.   Drain with sanguinous output. Posterior / occipital dressing noted.  Pupils reactive with decreased vision from her left eye (reports blurry vision).  CV:  RRR. No murmurs.  Lungs clear with non-labored respirations. Symmetrical chest wall movements. Right sided Mediport.  Abdomen soft and neither distended nor tender. No guarding or rebound. Cortes in place.  Extremities well perfused.      Remains on:    1)	Clear liquid diet.  2)	IV NS @ 50 ml/hour  3)	Decadron 8 mg IV q8 hours  4)	Ancef 1 gram IV q 8 hours x 3 doses.  5)	Colace 100 mg po tid  6)         Tylenol 650 mg every 6 hours.   7)         Oxycodone 5 mg every 6 hours.     Assessment:	This patient is a 57-year-old right handed female who has metastatic (stage IV) breast cancer with metastasis to her lymph nodes, lung, bone and brain who is POD#1, s/p occipital craniotomy and resection of metastasis. She is hemodynamically stable with no signs of increased intracranial pressure.   Plan to:    1)	Advance to regular diet. IV lock.   2)	Determine if she requires antiseizure prophylaxis (Keppra). Will discuss with the  	neurosurgeons.  3)	Plan to obtain repeat imaging tests today as per the neurosurgeons.  4)	Restart chemical VTE prophylaxis.   5)	Allow and assist her to be out of bed. Consult the physical therapists.  6)	Resume the gabapentin today.  7)	Remove Cortes catheter.   8)	Full support in place S: This patient was admitted to the surgical intensive care unit for higher level of nursing care after a craniotomy.   She had no acute events overnight. Was started on clear liquid diet and tolerated well. Denies new changes in vision, headache, dizziness, paralysis, weakness or paresis.       Vital Signs Last 24 Hrs  T(C): 36.2 (01 Aug 2017 00:00), Max: 36.7 (31 Jul 2017 08:19)  T(F): 97.1 (01 Aug 2017 00:00), Max: 98.1 (31 Jul 2017 08:19)  HR: 56 (01 Aug 2017 00:00) (48 - 84)  BP: 118/65 (31 Jul 2017 21:00) (118/65 - 157/83)  BP(mean): 79 (31 Jul 2017 21:00) (79 - 103)  RR: 13 (01 Aug 2017 00:00) (9 - 18)  SpO2: 100% (01 Aug 2017 00:00) (100% - 100%)      30 Jul 2017 07:01  -  31 Jul 2017 07:00  --------------------------------------------------------  IN:    Oral Fluid: 850 mL  Total IN: 850 mL    OUT:    Voided: 300 mL  Total OUT: 300 mL    Total NET: 550 mL      31 Jul 2017 07:01  -  01 Aug 2017 01:41  --------------------------------------------------------  IN:    IV PiggyBack: 100 mL    Lactated Ringers IV Bolus: 2250 mL    Oral Fluid: 300 mL    sodium chloride 0.9%: 320 mL    sodium chloride 0.9%.: 450 mL  Total IN: 3420 mL    OUT:    Drain: 35 mL    Estimated Blood Loss: 100 mL    Indwelling Catheter - Urethral: 2790 mL  Total OUT: 2925 mL    Total NET: 495 mL    Current wt	=	66.3 Kg    BMI		=	27.4    Awake, alert, and fully oriented. In no distress. Non-toxic.   Drain with sanguinous output. Posterior / occipital dressing noted.  Pupils reactive with decreased vision from her left eye (reports blurry vision) and L visual field deficit.  CV:  RRR. No murmurs.  Lungs clear with non-labored respirations. Symmetrical chest wall movements. Right sided Mediport.  Abdomen soft and neither distended nor tender. No guarding or rebound.   Strength 5/5 in B/L upper and lower extremities. Inability to flex 2-5th fingers on L hand. Patient says from prior brachial plexopathy after prior mastectomy.   Extremities well perfused.      Remains on:    1)	Decadron 8 mg IV q8 hours  2)	Ancef 1 gram IV q 8 hours x 3 doses.  3)	Colace 100 mg po tid  4)         Tylenol 650 mg every 6 hours.   5)         Oxycodone 5 mg every 6 hours.     Assessment:	This patient is a 57-year-old right handed female who has metastatic (stage IV) breast cancer with metastasis to her lymph nodes, lung, bone and brain who is POD#1, s/p occipital craniotomy and resection of metastasis. She is hemodynamically stable with no signs of increased intracranial pressure.   Plan to:    1)	Advanced to regular diet today. IV fluid discontinued.    2)	Seizure and DVT ppx as per Neurosurgery team.   3)	Repeat CT scan today showed normal post-op changes, interval decrease of vasogenic edema in R parietal lobe, no acute intracranial pathology.   4)	PT was consulted, patient ambulated today and she is out of bed.   5)	Arterial line and campos catheter were removed.   6)	Patient is stable for transfer to the floor under Neurosurgery service, no longer requiring SICU care.

## 2017-08-01 NOTE — PROGRESS NOTE ADULT - SUBJECTIVE AND OBJECTIVE BOX
No issues overnight  ICU Vital Signs Last 24 Hrs  T(C): 36.2 (01 Aug 2017 00:00), Max: 36.7 (31 Jul 2017 08:19)  T(F): 97.1 (01 Aug 2017 00:00), Max: 98.1 (31 Jul 2017 08:19)  HR: 54 (01 Aug 2017 02:00) (48 - 84)  BP: 118/65 (31 Jul 2017 21:00) (118/65 - 157/83)  BP(mean): 79 (31 Jul 2017 21:00) (79 - 103)  ABP: 119/82 (01 Aug 2017 02:00) (98/69 - 167/93)  ABP(mean): 97 (01 Aug 2017 02:00) (80 - 122)  RR: 16 (01 Aug 2017 02:00) (9 - 18)  SpO2: 100% (01 Aug 2017 02:00) (100% - 100%)    AAO X 3  PERRLA, EOMI  Left visual field cut  HSU strength 5/5    MEDICATIONS  (STANDING):  ceFAZolin   IVPB 1000 milliGRAM(s) IV Intermittent every 8 hours  dexamethasone  IVPB 8 milliGRAM(s) IV Intermittent every 8 hours  docusate sodium 100 milliGRAM(s) Oral three times a day  sodium chloride 0.9%. 1000 milliLiter(s) (50 mL/Hr) IV Continuous <Continuous>                          9.7    8.08  )-----------( 239      ( 31 Jul 2017 06:20 )             30.1   07-31    141  |  104  |  18  ----------------------------<  136<H>  4.2   |  27  |  0.61    Ca    9.1      31 Jul 2017 06:20    TPro  6.3  /  Alb  3.9  /  TBili  < 0.2<L>  /  DBili  x   /  AST  11  /  ALT  7   /  AlkPhos  45  07-31    SHERI: 100cc serosanguinous drainage No issues overnight  ICU Vital Signs Last 24 Hrs  T(C): 36.2 (01 Aug 2017 00:00), Max: 36.7 (31 Jul 2017 08:19)  T(F): 97.1 (01 Aug 2017 00:00), Max: 98.1 (31 Jul 2017 08:19)  HR: 54 (01 Aug 2017 02:00) (48 - 84)  BP: 118/65 (31 Jul 2017 21:00) (118/65 - 157/83)  BP(mean): 79 (31 Jul 2017 21:00) (79 - 103)  ABP: 119/82 (01 Aug 2017 02:00) (98/69 - 167/93)  ABP(mean): 97 (01 Aug 2017 02:00) (80 - 122)  RR: 16 (01 Aug 2017 02:00) (9 - 18)  SpO2: 100% (01 Aug 2017 02:00) (100% - 100%)    AAO X 3  PERRLA, EOMI  Left visual field cut  HSU LUE strength 4/5, RUE, Bilateral LE 5/5    MEDICATIONS  (STANDING):  ceFAZolin   IVPB 1000 milliGRAM(s) IV Intermittent every 8 hours  dexamethasone  IVPB 8 milliGRAM(s) IV Intermittent every 8 hours  docusate sodium 100 milliGRAM(s) Oral three times a day  sodium chloride 0.9%. 1000 milliLiter(s) (50 mL/Hr) IV Continuous <Continuous>                          9.7    8.08  )-----------( 239      ( 31 Jul 2017 06:20 )             30.1   07-31    141  |  104  |  18  ----------------------------<  136<H>  4.2   |  27  |  0.61    Ca    9.1      31 Jul 2017 06:20    TPro  6.3  /  Alb  3.9  /  TBili  < 0.2<L>  /  DBili  x   /  AST  11  /  ALT  7   /  AlkPhos  45  07-31    SHERI: 100cc serosanguinous drainage

## 2017-08-01 NOTE — PROGRESS NOTE ADULT - SUBJECTIVE AND OBJECTIVE BOX
POST ANESTHESIA EVALUATION    57y Female POSTOP DAY 1 S/P right occipital craniotomy    Vital Signs Last 24 Hrs  T(C): 36.2 (01 Aug 2017 08:00), Max: 36.7 (31 Jul 2017 20:00)  T(F): 97.2 (01 Aug 2017 08:00), Max: 98.1 (31 Jul 2017 20:00)  HR: 62 (01 Aug 2017 11:00) (48 - 67)  BP: 127/65 (01 Aug 2017 11:00) (118/65 - 157/83)  BP(mean): 80 (01 Aug 2017 11:00) (79 - 103)  RR: 14 (01 Aug 2017 11:00) (9 - 17)  SpO2: 100% (01 Aug 2017 11:00) (99% - 100%)  I&O's Summary    31 Jul 2017 07:01  -  01 Aug 2017 07:00  --------------------------------------------------------  IN: 3770 mL / OUT: 3820 mL / NET: -50 mL    01 Aug 2017 07:01  -  01 Aug 2017 11:10  --------------------------------------------------------  IN: 450 mL / OUT: 550 mL / NET: -100 mL        AIRWAY PATENCY:   normal    MENTAL STATUS: awake    HYDRATION STATUS :adequate    NAUSEA/ VOMITTING:  [x ] NONE  [ ] CONTROLLED [ ] OTHER     PAIN: x[ ] CONTROLLED WITH CURRENT REGIMEN  [ ] OTHER    [x ] NO APPARENT ANESTHESIA COMPLICATIONS      Comments: doing well. no complaints. care as per icu

## 2017-08-01 NOTE — PROGRESS NOTE ADULT - ATTENDING COMMENTS
August 1st, 2017  4 PM    Hospital Day #4  Post op Day #1  SICU Day #1    This patient remains in the SICU where she was seen and evaluated on daily SICU rounds with the multidisciplinary critical care team. Adjacent SICU resident note reviewed. Anesthesia and Neurosurgery PA input appreciated. Evaluated independently now.    BP		=	118 - 157/63 - 83  P		=	48 - 70		O2 Sat		=	99% - 100%  R		=	9 - 17		I/O		=	3,770 in/3,820 out  Temp		=	35.4 - 36.7				+ 0.1 l since admission  								SHERI = 55 ml  Glucose	=	-    Current wt	=	72.0 Kg  Max wt		=	66.3 Kg  Admit wt	=	65.8 Kg    BMI		=	27.4    Awake, alert, and fully oriented. In no distress. Non-toxic. Minimal complaints of a headache.  Drain with sanguinous output. Posterior / occipital dressing noted.  Pupils reactive with decreased vision from her left eye (reports blurry vision).  Anicteric.  No thrush. Oropharyngeal mucosa normal.  No JVD.  COR - RRR. No murmur.  Lungs clear with non-labored respirations. Symmetrical chest wall movements. Healed surgical scars from prior mastectomies. Right sided Mediport.  Abdomen soft and neither distended nor tender. No guarding or rebound. Ocrtes in place.  Extremities well perfused. Contractures of left fingers and complaints of chronic bilateral knee pain. Musculoskeletal exam otherwise normal.    WBC	=	8	Na		=	142  Neutro	=	-	K		=	4.2  Hgb	=	9	Cl		=	104  Hct	=	29%	HCO3		=	26  Plts	=	220	Glucose	=	147  			BUN		=	12  PT	=	-	Creat		=	0.6  PTT	=	-  INR	=	-	    Remains on:    1)	Regular diet.  2)	IV lock  3)	Decadron 8 mg IV q8 hours  4)	Colace 100 mg po tid  5)	Tylenol 650 mg po q6 hours  6)	Oxycodone 5 IR 5 mg po q6 hours prn    Assessment:	This patient is a 57-year-old right handed female who has metastatic (stage IV) breast cancer with metastasis to her lymph nodes, lung, bone and brain who is 1 day s/p occipital craniotomy and resection of metastasis who has asymptomatic bradycardia. She has normal respirations and if fully oriented without other signs of intracranial hypertension. She is being given systemic steroids.    Plan to:    1)	Review imaging tests (CT scan and MRI from today).  2)	Determine if she requires antiseizure prophylaxis (Keppra). Will discuss with the  	neurosurgeons.  3)	Change the decadron to oral form.  4)	Transfer out of the SICU. Unclear if she may be discharged to home.  5)	The patient and her spouse indicated that the patient was to have a CT scan  	of her chest, abdomen, and pelvis (as an outpatient) and was reportedly   	told by her neurosurgeon that she would have these tests done as an  	inpatient. Unclear as to what the scans would be done for but will  	discuss with the neurosurgical team.  6)	Allow and assist her to be out of bed. Previously had a gait abnormality so will plan  	to consult the physical therapists.  7)	Resume the gabapentin soon.  8)	Determine resuscitation status.  9)	Care discussed with the patient and her spouse.  10)	Full support in place    Terry Gerard  20 minutes exclusive of procedures.
Pt easily can go up few flight of stairs or walk few blocks without any chest pain or SOB. Patient is medically optimized for surgery and GA

## 2017-08-02 ENCOUNTER — TRANSCRIPTION ENCOUNTER (OUTPATIENT)
Age: 57
End: 2017-08-02

## 2017-08-02 VITALS
HEART RATE: 66 BPM | OXYGEN SATURATION: 100 % | TEMPERATURE: 98 F | SYSTOLIC BLOOD PRESSURE: 103 MMHG | DIASTOLIC BLOOD PRESSURE: 68 MMHG | RESPIRATION RATE: 18 BRPM

## 2017-08-02 LAB
HCT VFR BLD CALC: 31.8 % — LOW (ref 34.5–45)
HGB BLD-MCNC: 10.3 G/DL — LOW (ref 11.5–15.5)
MCHC RBC-ENTMCNC: 29.9 PG — SIGNIFICANT CHANGE UP (ref 27–34)
MCHC RBC-ENTMCNC: 32.4 % — SIGNIFICANT CHANGE UP (ref 32–36)
MCV RBC AUTO: 92.2 FL — SIGNIFICANT CHANGE UP (ref 80–100)
NRBC # FLD: 0 — SIGNIFICANT CHANGE UP
PLATELET # BLD AUTO: 201 K/UL — SIGNIFICANT CHANGE UP (ref 150–400)
PMV BLD: 8.9 FL — SIGNIFICANT CHANGE UP (ref 7–13)
RBC # BLD: 3.45 M/UL — LOW (ref 3.8–5.2)
RBC # FLD: 14.1 % — SIGNIFICANT CHANGE UP (ref 10.3–14.5)
WBC # BLD: 6.03 K/UL — SIGNIFICANT CHANGE UP (ref 3.8–10.5)
WBC # FLD AUTO: 6.03 K/UL — SIGNIFICANT CHANGE UP (ref 3.8–10.5)

## 2017-08-02 RX ORDER — GABAPENTIN 400 MG/1
300 CAPSULE ORAL
Qty: 12600 | Refills: 0 | OUTPATIENT
Start: 2017-08-02 | End: 2017-08-16

## 2017-08-02 RX ORDER — PANTOPRAZOLE SODIUM 20 MG/1
40 TABLET, DELAYED RELEASE ORAL
Qty: 0 | Refills: 0 | Status: DISCONTINUED | OUTPATIENT
Start: 2017-08-02 | End: 2017-08-02

## 2017-08-02 RX ORDER — DOCUSATE SODIUM 100 MG
1 CAPSULE ORAL
Qty: 0 | Refills: 0 | COMMUNITY
Start: 2017-08-02

## 2017-08-02 RX ORDER — OXYCODONE HYDROCHLORIDE 5 MG/1
1 TABLET ORAL
Qty: 30 | Refills: 0 | OUTPATIENT
Start: 2017-08-02 | End: 2017-08-07

## 2017-08-02 RX ORDER — PANTOPRAZOLE SODIUM 20 MG/1
1 TABLET, DELAYED RELEASE ORAL
Qty: 14 | Refills: 0 | OUTPATIENT
Start: 2017-08-02 | End: 2017-08-16

## 2017-08-02 RX ORDER — DEXAMETHASONE 0.5 MG/5ML
2 ELIXIR ORAL
Qty: 60 | Refills: 0 | OUTPATIENT
Start: 2017-08-02 | End: 2017-08-10

## 2017-08-02 RX ORDER — GABAPENTIN 400 MG/1
0 CAPSULE ORAL
Qty: 0 | Refills: 0 | COMMUNITY

## 2017-08-02 RX ORDER — LEVETIRACETAM 250 MG/1
500 TABLET, FILM COATED ORAL
Qty: 0 | Refills: 0 | Status: DISCONTINUED | OUTPATIENT
Start: 2017-08-02 | End: 2017-08-02

## 2017-08-02 RX ORDER — DEXAMETHASONE 0.5 MG/5ML
4 ELIXIR ORAL EVERY 6 HOURS
Qty: 0 | Refills: 0 | Status: DISCONTINUED | OUTPATIENT
Start: 2017-08-02 | End: 2017-08-02

## 2017-08-02 RX ORDER — LEVETIRACETAM 250 MG/1
1 TABLET, FILM COATED ORAL
Qty: 28 | Refills: 0 | OUTPATIENT
Start: 2017-08-02 | End: 2017-08-16

## 2017-08-02 RX ADMIN — Medication 4 MILLIGRAM(S): at 12:10

## 2017-08-02 RX ADMIN — Medication 101.6 MILLIGRAM(S): at 06:03

## 2017-08-02 RX ADMIN — Medication 650 MILLIGRAM(S): at 02:04

## 2017-08-02 RX ADMIN — PANTOPRAZOLE SODIUM 40 MILLIGRAM(S): 20 TABLET, DELAYED RELEASE ORAL at 07:16

## 2017-08-02 RX ADMIN — Medication 100 MILLIGRAM(S): at 06:03

## 2017-08-02 RX ADMIN — Medication 650 MILLIGRAM(S): at 03:00

## 2017-08-02 NOTE — PROGRESS NOTE ADULT - PROBLEM SELECTOR PLAN 1
For surgery tomorrow.
OR today for resection
1. Pain meds PRN  2., Neurochecks Q1H  3. decadron 4mg Q6  4. SHERI to suction  5. advance diet to regular  6. PT consult in AM  7. MRI brain in AM
Continue to monitor  Postop MRI
PT consult for discharge planning  Likely remove SHERI drain today
Preop for resection 7/31  NPO after midnight  Preop labs  Medical clearance

## 2017-08-02 NOTE — DISCHARGE NOTE ADULT - HOSPITAL COURSE
56 y/o F with PMH breast cancer mets to bone, lung, and lymph nodes dx 3/2014 s/p bilateral mastectomy w/ LN dissection, chemotherapy and radiation therapy found to have metastasis to brain  s/p resection in March, revision of cranial flap this month and gamma knife treatment p/w  for sudden difficulty walking and visual changes. Code stroke called in ER. Patient presenting with frontal headache, nausea and vomiting. Pt states she has had increased difficulty with ambulating today along with vomiting preceeded by seconds of nausea. She states this is similar to what she had prior to her 2nd craniotomy.  CT head revealed cerebral edema and MRI showed regrowth of tumor in the post operative bed. MRI was fully reviewed with patient and risk/benefits and different options were discussed, with patient opting to have tumor resected.  She underwent craniotomy with resection of right occipital brain metastatic lesion on 7/31/2017. She did well post-operatively but developed slight worsening of her right sided visual field cut. Post op MRI showed post repeat right occipitoparietal craniotomy and partial resection of right occipital/parietal masses.         Discharge Exam:   AAO X 3  PERRLA, EOMI, Left VF cut  HSU LUE 4+/5, strength otherwise 5/5  Gait stable  Incision c/d/i 58 y/o F with PMH breast cancer mets to bone, lung, and lymph nodes dx 3/2014 s/p bilateral mastectomy w/ LN dissection, chemotherapy and radiation therapy found to have metastasis to brain  s/p resection in March, revision of cranial flap this month and gamma knife treatment p/w  for sudden difficulty walking and visual changes. Code stroke called in ER. Patient presenting with frontal headache, nausea and vomiting. Pt states she has had increased difficulty with ambulating today along with vomiting preceeded by seconds of nausea. She states this is similar to what she had prior to her 2nd craniotomy.  CT head revealed cerebral edema and MRI showed regrowth of tumor in the post operative bed. MRI was fully reviewed with patient and risk/benefits and different options were discussed, with patient opting to have tumor resected.  She underwent craniotomy with resection of right occipital brain metastatic lesion on 7/31/2017. She did well post-operatively but developed slight worsening of her left sided visual field cut. Post op MRI showed post repeat right occipitoparietal craniotomy and partial resection of right occipital/parietal masses.  Patient was seen and evaluated by PT, she is ambulating independently and was cleared for d/c, she's tolerating regular diet, and stable for discharge.        Discharge Exam:   AAO X 3  PERRLA, EOMI, Left VF cut  HSU JADEE 4+/5, strength otherwise 5/5  Gait stable  Incision c/d/i

## 2017-08-02 NOTE — DISCHARGE NOTE ADULT - PATIENT PORTAL LINK FT
“You can access the [employer] Patient Portal, offered by [facility], by registering with the following website: [email or web address]”

## 2017-08-02 NOTE — DISCHARGE NOTE ADULT - CARE PROVIDER_API CALL
Ned Estes), Neurosurgery  General  [address] [place] [id number]  Phone: [phone]  Fax: [phone]

## 2017-08-02 NOTE — PROGRESS NOTE ADULT - PROVIDER SPECIALTY LIST ADULT
Anesthesia
Critical Care
Internal Medicine
Neurosurgery
SICU
Neurosurgery
Neurosurgery

## 2017-08-02 NOTE — PROGRESS NOTE ADULT - SUBJECTIVE AND OBJECTIVE BOX
No issues overnight  Vital Signs Last 24 Hrs  T(C): 36.7 (02 Aug 2017 01:20), Max: 36.8 (01 Aug 2017 19:04)  T(F): 98.1 (02 Aug 2017 01:20), Max: 98.2 (01 Aug 2017 19:04)  HR: 60 (02 Aug 2017 01:20) (51 - 79)  BP: 116/70 (02 Aug 2017 01:20) (102/66 - 141/78)  BP(mean): 74 (01 Aug 2017 18:00) (70 - 95)  RR: 18 (02 Aug 2017 01:20) (12 - 21)  SpO2: 100% (02 Aug 2017 01:20) (97% - 100%)    AAO X 3  PERRLA, EOMI  Left VF cut  HSU LUE 4+/5, strength otherwise5/5    MEDICATIONS  (STANDING):  dexamethasone  IVPB 8 milliGRAM(s) IV Intermittent every 8 hours  docusate sodium 100 milliGRAM(s) Oral three times a day  enoxaparin Injectable 40 milliGRAM(s) SubCutaneous daily                          9.0    7.52  )-----------( 220      ( 01 Aug 2017 04:30 )             28.6   08-01    142  |  104  |  12  ----------------------------<  147<H>  4.2   |  26  |  0.63    Ca    8.4      01 Aug 2017 04:30    TPro  6.3  /  Alb  3.9  /  TBili  < 0.2<L>  /  DBili  x   /  AST  11  /  ALT  7   /  AlkPhos  45  07-31    SHERI: 7.5cc    MRI: Post repeat right occipitoparietal craniotomy and partial resection of right  occipital/parietal masses. Persistent, although smaller residual enhancing  masses at the right parietal/occipital resection site, with suspicion of  partial invasion of a patent superior sagittal sinus/torcula as described  above. Stable right parietal/occipital/temporal vasogenic edema. No acute  parenchymal hemorrhage. No midline shift. No hydrocephalus.

## 2017-08-02 NOTE — PROGRESS NOTE ADULT - ASSESSMENT
58 YO female with recurrent brain metastasis
58 YO female stable POD # 1 craniotomy for recurrent brain metastasis
58 YO female with recurrent metastatic brain tumor
58 y/o F, w/ breast CA, mets to brain, s/p right occipital craniotomy
stable s/p SOC for recurrent brain metastasis

## 2017-08-02 NOTE — DISCHARGE NOTE ADULT - CARE PROVIDERS DIRECT ADDRESSES
[email or web address]

## 2017-08-02 NOTE — DISCHARGE NOTE ADULT - CARE PLAN
Principal Discharge DX:	Brain mass  Goal:	s/p craniotomy  Instructions for follow-up, activity and diet:	diet-regular  activity-FWBAT, see above  Secondary Diagnosis:	Breast cancer  Goal:	f/u with Oncologist

## 2017-08-02 NOTE — DISCHARGE NOTE ADULT - MEDICATION SUMMARY - MEDICATIONS TO TAKE
I will START or STAY ON the medications listed below when I get home from the hospital:    venkat 600  -- 1 tab(s) by mouth once a day  -- Indication: For home med    dexamethasone 2 mg oral tablet  -- 2 tab(s) PO 6H x2 days, then 2 tabsPO Q8H x2 days, then 2 tabs POQ12H x2 days, then2 tabPO daily x2 days, then1 tab podaily x2 days then d/c MDD:16mg  -- It is very important that you take or use this exactly as directed.  Do not skip doses or discontinue unless directed by your doctor.  Obtain medical advice before taking any non-prescription drugs as some may affect the action of this medication.  Take with food or milk.    -- Indication: For edema    oxyCODONE 5 mg oral tablet  -- 1 tab(s) by mouth every 4 hours, As needed, Moderate Pain (4 - 6) MDD:6  -- Indication: For prn pain    Tylenol 500 mg oral tablet  -- 2 tab(s) by mouth every 6 hours, As Needed  -- Indication: For prn    levETIRAcetam 500 mg oral tablet  -- 1 tab(s) by mouth 2 times a day MDD:2 tabs  -- Indication: For Seizure ppx    gabapentin 300 mg oral tablet  -- 300 milligram(s) by mouth 3 times a day, As Needed MDD:900mg  -- Indication: For Neuropathic pain    docusate sodium 100 mg oral capsule  -- 1 cap(s) by mouth 3 times a day  -- Indication: For Bowel regimen    pantoprazole 40 mg oral delayed release tablet  -- 1 tab(s) by mouth once a day (before a meal) MDD:1 tab  -- Indication: For GI ppx    Vitamin D3 1000 intl units oral capsule  -- 1 cap(s) by mouth once a day  -- Indication: For home med

## 2017-08-02 NOTE — DISCHARGE NOTE ADULT - INSTRUCTIONS
Please shower on post-operative day #4 (8/3/2017) and make sure incision is clean and dry afterwards. diet-regular

## 2017-08-02 NOTE — DISCHARGE NOTE ADULT - NS AS ACTIVITY OBS
Do not drive or operate machinery/No Heavy lifting/straining/Walking-Outdoors allowed/may shower on Friday/Walking-Indoors allowed

## 2017-08-22 ENCOUNTER — APPOINTMENT (OUTPATIENT)
Dept: NEUROSURGERY | Facility: CLINIC | Age: 57
End: 2017-08-22

## 2017-08-28 ENCOUNTER — EMERGENCY (EMERGENCY)
Facility: HOSPITAL | Age: 57
LOS: 1 days | Discharge: HOME | End: 2017-08-28
Attending: EMERGENCY MEDICINE | Admitting: EMERGENCY MEDICINE
Payer: MEDICAID

## 2017-08-28 VITALS
SYSTOLIC BLOOD PRESSURE: 133 MMHG | HEART RATE: 92 BPM | TEMPERATURE: 98 F | OXYGEN SATURATION: 100 % | RESPIRATION RATE: 17 BRPM | DIASTOLIC BLOOD PRESSURE: 88 MMHG

## 2017-08-28 DIAGNOSIS — Z98.890 OTHER SPECIFIED POSTPROCEDURAL STATES: Chronic | ICD-10-CM

## 2017-08-28 DIAGNOSIS — C79.31 SECONDARY MALIGNANT NEOPLASM OF BRAIN: ICD-10-CM

## 2017-08-28 DIAGNOSIS — Z90.13 ACQUIRED ABSENCE OF BILATERAL BREASTS AND NIPPLES: Chronic | ICD-10-CM

## 2017-08-28 LAB
ALBUMIN SERPL ELPH-MCNC: 4.1 G/DL — SIGNIFICANT CHANGE UP (ref 3.3–5)
ALP SERPL-CCNC: 64 U/L — SIGNIFICANT CHANGE UP (ref 40–120)
ALT FLD-CCNC: 19 U/L — SIGNIFICANT CHANGE UP (ref 4–33)
APTT BLD: 33.4 SEC — SIGNIFICANT CHANGE UP (ref 27.5–37.4)
AST SERPL-CCNC: 50 U/L — HIGH (ref 4–32)
BASOPHILS # BLD AUTO: 0.02 K/UL — SIGNIFICANT CHANGE UP (ref 0–0.2)
BASOPHILS NFR BLD AUTO: 0.4 % — SIGNIFICANT CHANGE UP (ref 0–2)
BILIRUB SERPL-MCNC: 0.2 MG/DL — SIGNIFICANT CHANGE UP (ref 0.2–1.2)
BLD GP AB SCN SERPL QL: NEGATIVE — SIGNIFICANT CHANGE UP
BUN SERPL-MCNC: 6 MG/DL — LOW (ref 7–23)
CALCIUM SERPL-MCNC: 9.9 MG/DL — SIGNIFICANT CHANGE UP (ref 8.4–10.5)
CHLORIDE SERPL-SCNC: 101 MMOL/L — SIGNIFICANT CHANGE UP (ref 98–107)
CO2 SERPL-SCNC: 25 MMOL/L — SIGNIFICANT CHANGE UP (ref 22–31)
CREAT SERPL-MCNC: 0.75 MG/DL — SIGNIFICANT CHANGE UP (ref 0.5–1.3)
EOSINOPHIL # BLD AUTO: 0.19 K/UL — SIGNIFICANT CHANGE UP (ref 0–0.5)
EOSINOPHIL NFR BLD AUTO: 3.9 % — SIGNIFICANT CHANGE UP (ref 0–6)
GLUCOSE SERPL-MCNC: 87 MG/DL — SIGNIFICANT CHANGE UP (ref 70–99)
HCT VFR BLD CALC: 37.6 % — SIGNIFICANT CHANGE UP (ref 34.5–45)
HGB BLD-MCNC: 11.5 G/DL — SIGNIFICANT CHANGE UP (ref 11.5–15.5)
IMM GRANULOCYTES # BLD AUTO: 0.01 # — SIGNIFICANT CHANGE UP
IMM GRANULOCYTES NFR BLD AUTO: 0.2 % — SIGNIFICANT CHANGE UP (ref 0–1.5)
INR BLD: 0.92 — SIGNIFICANT CHANGE UP (ref 0.88–1.17)
LYMPHOCYTES # BLD AUTO: 1.77 K/UL — SIGNIFICANT CHANGE UP (ref 1–3.3)
LYMPHOCYTES # BLD AUTO: 36.3 % — SIGNIFICANT CHANGE UP (ref 13–44)
MCHC RBC-ENTMCNC: 28.6 PG — SIGNIFICANT CHANGE UP (ref 27–34)
MCHC RBC-ENTMCNC: 30.6 % — LOW (ref 32–36)
MCV RBC AUTO: 93.5 FL — SIGNIFICANT CHANGE UP (ref 80–100)
MONOCYTES # BLD AUTO: 0.46 K/UL — SIGNIFICANT CHANGE UP (ref 0–0.9)
MONOCYTES NFR BLD AUTO: 9.4 % — SIGNIFICANT CHANGE UP (ref 2–14)
NEUTROPHILS # BLD AUTO: 2.42 K/UL — SIGNIFICANT CHANGE UP (ref 1.8–7.4)
NEUTROPHILS NFR BLD AUTO: 49.8 % — SIGNIFICANT CHANGE UP (ref 43–77)
NRBC # FLD: 0 — SIGNIFICANT CHANGE UP
PLATELET # BLD AUTO: 213 K/UL — SIGNIFICANT CHANGE UP (ref 150–400)
PMV BLD: 9.5 FL — SIGNIFICANT CHANGE UP (ref 7–13)
POTASSIUM SERPL-MCNC: 5.6 MMOL/L — HIGH (ref 3.5–5.3)
POTASSIUM SERPL-SCNC: 5.6 MMOL/L — HIGH (ref 3.5–5.3)
PROT SERPL-MCNC: 7.5 G/DL — SIGNIFICANT CHANGE UP (ref 6–8.3)
PROTHROM AB SERPL-ACNC: 10.3 SEC — SIGNIFICANT CHANGE UP (ref 9.8–13.1)
RBC # BLD: 4.02 M/UL — SIGNIFICANT CHANGE UP (ref 3.8–5.2)
RBC # FLD: 14.8 % — HIGH (ref 10.3–14.5)
RH IG SCN BLD-IMP: POSITIVE — SIGNIFICANT CHANGE UP
SODIUM SERPL-SCNC: 142 MMOL/L — SIGNIFICANT CHANGE UP (ref 135–145)
WBC # BLD: 4.87 K/UL — SIGNIFICANT CHANGE UP (ref 3.8–10.5)
WBC # FLD AUTO: 4.87 K/UL — SIGNIFICANT CHANGE UP (ref 3.8–10.5)

## 2017-08-28 PROCEDURE — 99284 EMERGENCY DEPT VISIT MOD MDM: CPT | Mod: GC

## 2017-08-28 PROCEDURE — 70450 CT HEAD/BRAIN W/O DYE: CPT | Mod: 26

## 2017-08-28 PROCEDURE — 99218: CPT

## 2017-08-28 RX ORDER — DEXAMETHASONE 0.5 MG/5ML
10 ELIXIR ORAL ONCE
Qty: 0 | Refills: 0 | Status: COMPLETED | OUTPATIENT
Start: 2017-08-28 | End: 2017-08-28

## 2017-08-28 RX ORDER — LEVETIRACETAM 250 MG/1
500 TABLET, FILM COATED ORAL EVERY 12 HOURS
Qty: 0 | Refills: 0 | Status: DISCONTINUED | OUTPATIENT
Start: 2017-08-28 | End: 2017-09-01

## 2017-08-28 RX ORDER — SODIUM CHLORIDE 9 MG/ML
1000 INJECTION, SOLUTION INTRAVENOUS
Qty: 0 | Refills: 0 | Status: DISCONTINUED | OUTPATIENT
Start: 2017-08-28 | End: 2017-09-01

## 2017-08-28 RX ORDER — DEXAMETHASONE 0.5 MG/5ML
4 ELIXIR ORAL EVERY 6 HOURS
Qty: 0 | Refills: 0 | Status: DISCONTINUED | OUTPATIENT
Start: 2017-08-28 | End: 2017-09-01

## 2017-08-28 RX ORDER — ACETAMINOPHEN 500 MG
650 TABLET ORAL ONCE
Qty: 0 | Refills: 0 | Status: COMPLETED | OUTPATIENT
Start: 2017-08-28 | End: 2017-08-28

## 2017-08-28 RX ORDER — LEVETIRACETAM 250 MG/1
1000 TABLET, FILM COATED ORAL ONCE
Qty: 0 | Refills: 0 | Status: COMPLETED | OUTPATIENT
Start: 2017-08-28 | End: 2017-08-28

## 2017-08-28 RX ADMIN — Medication 102 MILLIGRAM(S): at 17:50

## 2017-08-28 RX ADMIN — SODIUM CHLORIDE 125 MILLILITER(S): 9 INJECTION, SOLUTION INTRAVENOUS at 23:39

## 2017-08-28 RX ADMIN — Medication 650 MILLIGRAM(S): at 22:21

## 2017-08-28 RX ADMIN — SODIUM CHLORIDE 125 MILLILITER(S): 9 INJECTION, SOLUTION INTRAVENOUS at 22:41

## 2017-08-28 RX ADMIN — Medication 4 MILLIGRAM(S): at 23:38

## 2017-08-28 RX ADMIN — LEVETIRACETAM 400 MILLIGRAM(S): 250 TABLET, FILM COATED ORAL at 17:31

## 2017-08-28 RX ADMIN — Medication 650 MILLIGRAM(S): at 17:50

## 2017-08-28 NOTE — ED CDU PROVIDER NOTE - MEDICAL DECISION MAKING DETAILS
60yF w/ PMHx breast cancer with mets to brain/lung s/p b/l mastectomy and 2 craniotomies most recent july 31 2017, presenting with visual changes of flashing lights. Per neuro surgery recs admitted to CDU for MRI brain w and w/o contrast, Keppra and decadron. Symptoms have since resolved. 60yF w/ PMHx breast cancer with mets to brain/lung s/p b/l mastectomy and 2 craniotomies most recent july 31 2017, presenting with visual changes of flashing lights. Per neuro surgery recs admitted to CDU for MRI brain w and w/o contrast, Keppra and decadron. 60yF w/ PMHx breast cancer with mets to brain/lung s/p b/l mastectomy and 2 craniotomies most recent july 31 2017, presenting with visual changes of flashing lights and blurry vision b/l. Per neuro surgery recs admitted to CDU for MRI brain w and w/o contrast, Keppra and decadron. 60yF w/ PMHx breast cancer with mets to brain/lung s/p b/l mastectomy and 2 craniotomies most recent july 31 2017, presenting with visual changes of flashing lights b/l. Per neuro surgery recs admitted to CDU for MRI brain w and w/o contrast, Keppra and decadron. 60yF w/ PMHx breast cancer with mets to brain/lung s/p b/l mastectomy and 2 craniotomies most recent july 31 2017, presenting with visual changes of flashing lights b/l. Per neuro surgery recs admitted to CDU for MRI brain w and w/o contrast, Keppra and decadron.  Ajith: High risk pt with flashes in vision. consider seizure vs tia or stroke. Hx of prior neurosurgery. decadron and keppra as per NS. observe in ED and arrange MRI.

## 2017-08-28 NOTE — CONSULT NOTE ADULT - SUBJECTIVE AND OBJECTIVE BOX
HPI: This is a 57y female with hx of breast cancer, brain mets w/ recent craniotomy and resection of lesion. Pt presents with flashing lights worsening today.     PAST MEDICAL & SURGICAL HISTORY:  Brachial neuritis: left  Breast cancer: left, mets to bone, lung, lymph nodes dx in 2014  mets to brain dx 2017  H/O brain surgery: craniotomy 3/2017  H/O bilateral mastectomy  H/O bilateral mastectomy: 2014    Allergies    Allergy Status Unknown    Intolerances        SOCIAL HISTORY:  FAMILY HISTORY:  No pertinent family history in first degree relatives    Vital Signs Last 24 Hrs  T(C): 36.6 (28 Aug 2017 14:44), Max: 36.6 (28 Aug 2017 14:44)  T(F): 97.9 (28 Aug 2017 14:44), Max: 97.9 (28 Aug 2017 14:44)  HR: 80 (28 Aug 2017 15:35) (80 - 92)  BP: 129/80 (28 Aug 2017 15:35) (129/80 - 133/88)  BP(mean): --  RR: 16 (28 Aug 2017 15:35) (16 - 17)  SpO2: 100% (28 Aug 2017 15:35) (100% - 100%)    PHYSICAL EXAM:  Awake Alert Attentive Affect appropriate  Cranial Nerves II-XII Intact  Pupils: PERRL  Motor- Moving all extremities well  Sensory Intact to Light Touch  Reflexes WNL  Coordination Intact    LABS:      RADIOLOGY & ADDITIONAL STUDIES:      ct head-

## 2017-08-28 NOTE — CONSULT NOTE ADULT - SUBJECTIVE AND OBJECTIVE BOX
HPI: 57yof h/o breast CA s/p 2x craniotomies and GKRS for R occipital metastatic lesion now p/w flashing lights in her L visual field. CTH in ER shows improving vasogenic edema. She has been off decadron keppra for several weeks,.    PMHx: as above  PSHx: as above  Medications: dexamethasone  IVPB  levETIRAcetam  IVPB    VS: T(C): 36.6 (08-28-17 @ 14:44)  HR: 80 (08-28-17 @ 15:35)  BP: 129/80 (08-28-17 @ 15:35)  RR: 16 (08-28-17 @ 15:35)  SpO2: 100% (08-28-17 @ 15:35)  Wt(kg): --                          11.5   4.87  )-----------( 213      ( 28 Aug 2017 15:32 )             37.6     08-28    142  |  101  |  6<L>  ----------------------------<  87  5.6<H>   |  25  |  0.75    Ca    9.9      28 Aug 2017 15:32    TPro  7.5  /  Alb  4.1  /  TBili  0.2  /  DBili  x   /  AST  50<H>  /  ALT  19  /  AlkPhos  64  08-28    PT/INR - ( 28 Aug 2017 15:30 )   PT: 10.3 SEC;   INR: 0.92          PTT - ( 28 Aug 2017 15:30 )  PTT:33.4 SEC    Exam:  AAOx3  PERRL, EOMI, face symmetric, no tongue deviation  no finger counting in L visual field  5/5 throughout, no pronator drift  Sensation intact to light touch throughout  Reflexes 2+ throughout  No dysmetria

## 2017-08-28 NOTE — ED CDU PROVIDER NOTE - ATTENDING CONTRIBUTION TO CARE
Marisa: 57F h/o breast ca w/mets to bone/lungs/LN s/p bilateral mastectomy, found to have brain met s/p resection in march, s/p regrowth and resection7/31 p/w flashing lights and blurry vision since 1300 today, Sx in BL fields. States this feels similar to when she presented in 7/2017 with the brain mass recurrence. Endorses mild headache near craniotomy site. Denies diplopia, CP, SOB, weakness, numbness, tingling. On exam pt is in no distress, clear lungs, normal cardiac, abd soft, neuro exam notes 4/5 LUE strength, oriented x 4.  Placed in CDU for MRI and NS and neurology evals.

## 2017-08-28 NOTE — ED CDU PROVIDER NOTE - OBJECTIVE STATEMENT
57yF w/ PMHx 60yF w/ PMHx breast cancer s/p bilateral mastectomy with mets to lung and brain s/p 2 craniotomies most recent in July 31 presenting with visual changes x1day. Pt states this afternoon she saw flashing lights bilaterally, which is the same presentation she had one month ago prior to craniotomy, associated with a mild headache. Pt denies chest pain, shortness of breath, diplopia, new onset weakness, tingling, numbness, difficulty speaking or any other concerns. 60yF w/ PMHx breast cancer s/p bilateral mastectomy with mets to lung and brain s/p 2 craniotomies most recent in July 31 presenting with visual changes x1day. Pt states this afternoon she saw flashing lights bilaterally, which is the same presentation she had one month ago prior to craniotomy, associated with a mild headache. Pt denies chest pain, shortness of breath, diplopia, new onset weakness, tingling, numbness, difficulty speaking or any other concerns.  Since receiving Keppra and decadron in main ED visual symptoms have resolved, still with mild headache. 60yF w/ PMHx breast cancer s/p bilateral mastectomy with mets to lung and brain s/p 2 craniotomies most recent in July 31 presenting with visual changes x1day. Pt states this afternoon she saw flashing lights bilaterally with blurry vision, which is the same presentation she had one month ago prior to craniotomy, associated with a right sided mild headache. Pt denies chest pain, shortness of breath, diplopia, new onset weakness, tingling, numbness, difficulty speaking or any other concerns.  Since receiving Keppra and decadron in main ED visual symptoms have resolved, still with mild headache. 60yF w/ PMHx breast cancer s/p bilateral mastectomy with mets to lung and brain s/p 2 craniotomies most recent in July 31 presenting with visual changes x1day. Pt states this afternoon she saw flashing lights bilaterally, which is the same presentation she had one month ago prior to craniotomy, associated with a right sided mild headache. Pt denies chest pain, shortness of breath, diplopia, new onset weakness, tingling, numbness, difficulty speaking or any other concerns.  Since receiving Keppra and decadron in main ED visual symptoms have resolved, still with mild headache.

## 2017-08-28 NOTE — ED PROVIDER NOTE - MEDICAL DECISION MAKING DETAILS
Flashing lights in both visual fields, similar to prior brain met recurrences.  CTH, Neuro sx eval. Reassess.  Does not appear c/w retinal detachment, given hx.  But will also d/w neurosx.

## 2017-08-28 NOTE — ED PROVIDER NOTE - OBJECTIVE STATEMENT
57F h/o breast ca w/mets to bone/lungs/LN s/p bilateral mastectomy, found to have brain met s/p resection in march, s/p regrowth and resection7/31 p/w flashing lights and blurry vision since 1300 today. States this feels similar to when she presented in 7/2017 with the brain mass reoccurrence. Endorses mild headache near craniotomy site. Denies diplopia, CP, SOB, weakness, numbness, tingling. 57F h/o breast ca w/mets to bone/lungs/LN s/p bilateral mastectomy, found to have brain met s/p resection in march, s/p regrowth and resection7/31 p/w flashing lights and blurry vision since 1300 today, Sx in BL fields. States this feels similar to when she presented in 7/2017 with the brain mass recurrence. Endorses mild headache near craniotomy site. Denies diplopia, CP, SOB, weakness, numbness, tingling.

## 2017-08-28 NOTE — ED CDU PROVIDER NOTE - PLAN OF CARE
Follow up with your Doctor in 1-2 days.  Follow up with Radiation Oncology Dr. Capps [phone] [address] [facility] in 1-2 days.  Follow up with your Neurosurgeon Dr Estes in 1-2 days.  Take Keppra 500mg one tablet orally 2 x a day.  Take Dexamethasone 4mg orally one tablet 2 x a day.  Return to the ER for any persistent/worsening or new symptoms, weakness, dizziness, headache, change in vision, nausea, vomiting or any concerning symptoms.

## 2017-08-28 NOTE — CONSULT NOTE ADULT - ATTENDING COMMENTS
Patient was seen and examined with house staff. For additional details of history and examination, please see house staff note above. I agree with house staff assessment and recommendations except as noted below.    history most suggestive of focal seizure. Agree with plan above.

## 2017-08-28 NOTE — ED ADULT TRIAGE NOTE - CHIEF COMPLAINT QUOTE
pt s/p craniotomy 6/31 c/o of seeing "flashing lights" starting at 13:30pm with frontal headache.  MD Kwon called to triage, no code stroke called.

## 2017-08-28 NOTE — ED CDU PROVIDER NOTE - PROGRESS NOTE DETAILS
KENYON Santoyo: pt resting comfortably in bed NAD pt states she feels better denies headache.  Awaiting official MRI report.  Rad oncology to be paged for consult.  Will continue to monitor. assumed care at 7am  I Walter Canseco MD have personally performed a face to face diagnostic evaluation on this patient.  I have reviewed the ACP note and agree with the history, exam, and plan of care, except as noted.   My medical decision making as patient has changes on MRI, will get rads onc involved and discuss with primary..  No headache now feels well. KENYON Santoyo: pt seen and evaluated by both Neurosurgery and Radiation Medicine advised to discharge home on Dexamethasone and Keppra; pt to follow up next week.  Pt feels well pain free.  Results and discharge reviewed with patient. Ajith: Patient seen and examined, has received MRI and read available. Discussed with ACP and chart reviewed. Discussed with neurosurgery. Patient safe for discharge from CDU.  will arrange fu with PMD.

## 2017-08-28 NOTE — CONSULT NOTE ADULT - SUBJECTIVE AND OBJECTIVE BOX
HPI:    57yof h/o breast CA s/p 2x craniotomies and gamma knife surgery for R occipital metastatic lesion. She presented today with flashing lights in her L visual field.     CTH in ER shows improving vasogenic edema. She has been off decadron keppra for several weeks. She doesn't complain nausea, vomiting and headache. After she received dexamethasone in ED, her symptoms of flashing lights improved.         MEDICATIONS  (STANDING):    MEDICATIONS  (PRN):    PAST MEDICAL & SURGICAL HISTORY:  Brachial neuritis: left  Breast cancer: left, mets to bone, lung, lymph nodes dx in 2014  mets to brain dx 2017  H/O brain surgery: craniotomy 3/2017  H/O bilateral mastectomy  H/O bilateral mastectomy: 2014    FAMILY HISTORY:  No pertinent family history in first degree relatives    Allergies    Allergy Status Unknown    Intolerances        SHx - No smoking, No ETOH, No drug abuse      Review of Systems:  CONSTITUTIONAL:  No weight loss, fever, chills, weakness or fatigue.  HEENT:  Eyes:  No visual loss, blurred vision, double vision or yellow sclerae. Ears, Nose, Throat:  No hearing loss, sneezing, congestion, runny nose or sore throat.  SKIN:  No rash or itching.  CARDIOVASCULAR:  No chest pain, chest pressure or chest discomfort. No palpitations or edema.  RESPIRATORY:  No shortness of breath, cough or sputum.  GASTROINTESTINAL:  No anorexia, nausea, vomiting or diarrhea. No abdominal pain or blood.  GENITOURINARY:  NO Burning on urination.   NEUROLOGICAL: See HPI  MUSCULOSKELETAL:  No muscle, back pain, joint pain or stiffness.  HEMATOLOGIC:  No anemia, bleeding or bruising.  LYMPHATICS:  No enlarged nodes. No history of splenectomy.  PSYCHIATRIC:  No history of depression or anxiety.  ENDOCRINOLOGIC:  No reports of sweating, cold or heat intolerance. No polyuria or polydipsia.  ALLERGIES:  No history of asthma, hives, eczema or rhinitis.        Vital Signs Last 24 Hrs  T(C): 36.6 (28 Aug 2017 14:44), Max: 36.6 (28 Aug 2017 14:44)  T(F): 97.9 (28 Aug 2017 14:44), Max: 97.9 (28 Aug 2017 14:44)  HR: 72 (28 Aug 2017 17:50) (72 - 92)  BP: 128/75 (28 Aug 2017 17:50) (128/75 - 133/88)  BP(mean): --  RR: 16 (28 Aug 2017 17:50) (16 - 17)  SpO2: 100% (28 Aug 2017 17:50) (100% - 100%)    General Exam:   General appearance: No acute distress                   Neurological Exam:  Mental Status: Orientated to self, date and place.  Attention intact.  No dysarthria, aphasia or neglect.   Cranial Nerves: PERRL, EOMI, no vision  No APD.  Fundi not visualized bilaterally.  CN V1-3 intact to light touch and pinprick.  No facial asymmetry.  Hearing intact to finger rub bilaterally.  Tongue, uvula and palate midline.  Sternocleidomastoid and Trapezius intact bilaterally.    Motor:   Tone: normal.                  Strength:     [] Upper extremity                      Delt       Bicep    Tricep                                                  R         5/5        5/5        5/5       5/5                                               L          5/5        5/5        5/5       5/5  [] Lower extremity                       HF          KE          KF        DF         PF                                               R        5/5        5/5        5/5       5/5       5/5                                               L         5/5        5/5       5/5       5/5        5/5  Pronator drift: none                 Dysmetria: None to finger-nose-finger or heel-shin-heel  No truncal ataxia.    Tremor: No resting, postural or action tremor.  No myoclonus.    Sensation: intact to light touch, pinprick, vibration and proprioception    Deep Tendon Reflexes: 1+ bilateral biceps, triceps, brachioradialis, knee and ankle  Toes flexor bilaterally    Gait: normal and stable.      Other:    08-28    142  |  101  |  6<L>  ----------------------------<  87  5.6<H>   |  25  |  0.75    Ca    9.9      28 Aug 2017 15:32    TPro  7.5  /  Alb  4.1  /  TBili  0.2  /  DBili  x   /  AST  50<H>  /  ALT  19  /  AlkPhos  64  08-28 08-28    142  |  101  |  6<L>  ----------------------------<  87  5.6<H>   |  25  |  0.75    Ca    9.9      28 Aug 2017 15:32    TPro  7.5  /  Alb  4.1  /  TBili  0.2  /  DBili  x   /  AST  50<H>  /  ALT  19  /  AlkPhos  64  08-28                          11.5   4.87  )-----------( 213      ( 28 Aug 2017 15:32 )             37.6       Radiology    CT  MRI  EKG:  tele:  TTE:  EEG: HPI:    57yof h/o breast CA s/p 2x craniotomies and gamma knife surgery for R occipital metastatic lesion. She presented today with flashing lights in her L visual field.     CTH in ER shows improving vasogenic edema. She has been off decadron keppra for several weeks. She doesn't complain nausea, vomiting and headache. After she received dexamethasone in ED, her symptoms of flashing lights improved.   She had baseline weakness in left upper extremity and left inferior temporal vision loss but had no new neurological deficits.       MEDICATIONS  (STANDING):    MEDICATIONS  (PRN):    PAST MEDICAL & SURGICAL HISTORY:  Brachial neuritis: left  Breast cancer: left, mets to bone, lung, lymph nodes dx in 2014  mets to brain dx 2017  H/O brain surgery: craniotomy 3/2017  H/O bilateral mastectomy  H/O bilateral mastectomy: 2014    FAMILY HISTORY:  No pertinent family history in first degree relatives    Allergies    Allergy Status Unknown    Intolerances        SHx - No smoking, No ETOH, No drug abuse      Review of Systems:  CONSTITUTIONAL:  No weight loss, fever, chills, weakness or fatigue.  HEENT:  Eyes:  flashes lights   SKIN:  No rash or itching.  CARDIOVASCULAR:  No chest pain, chest pressure or chest discomfort. No palpitations or edema.  RESPIRATORY:  No shortness of breath, cough or sputum.  GASTROINTESTINAL:  No anorexia, nausea, vomiting or diarrhea. No abdominal pain or blood.  GENITOURINARY:  NO Burning on urination.   NEUROLOGICAL: See HPI  MUSCULOSKELETAL:  No muscle, back pain, joint pain or stiffness.  HEMATOLOGIC:  No anemia, bleeding or bruising.  LYMPHATICS:  No enlarged nodes. No history of splenectomy.  PSYCHIATRIC:  No history of depression or anxiety.  ENDOCRINOLOGIC:  No reports of sweating, cold or heat intolerance. No polyuria or polydipsia.  ALLERGIES:  No history of asthma, hives, eczema or rhinitis.        Vital Signs Last 24 Hrs  T(C): 36.6 (28 Aug 2017 14:44), Max: 36.6 (28 Aug 2017 14:44)  T(F): 97.9 (28 Aug 2017 14:44), Max: 97.9 (28 Aug 2017 14:44)  HR: 72 (28 Aug 2017 17:50) (72 - 92)  BP: 128/75 (28 Aug 2017 17:50) (128/75 - 133/88)  BP(mean): --  RR: 16 (28 Aug 2017 17:50) (16 - 17)  SpO2: 100% (28 Aug 2017 17:50) (100% - 100%)    General Exam:   General appearance: No acute distress                   Neurological Exam:  Mental Status: Orientated to self, date and place.  Attention intact.  No dysarthria, aphasia or neglect.   Cranial Nerves: PERRL, EOMI, no vision  No APD.  Fundi not visualized bilaterally.  CN V1-3 intact to light touch and pinprick.  No facial asymmetry.  Hearing intact to finger rub bilaterally.  Tongue, uvula and palate midline.  Sternocleidomastoid and Trapezius intact bilaterally.    Motor:   Tone: normal.                  Strength:     [] Upper extremity                      Delt       Bicep    Tricep                                                  R         5/5 5/5 5/5       5/5                                               L         4 /5 4/5 4/5 4/5  [] Lower extremity                       HF          KE          KF        DF         PF                                               R        5/5 5/5 5/5 5/5       5/5                                               L         5/5 5/5 5/5       5/5        5/5  Pronator drift: none                 Dysmetria: None to finger-nose-finger or heel-shin-heel  No truncal ataxia.    Tremor: No resting, postural or action tremor.  No myoclonus.    Sensation: intact to light touch, pinprick, vibration and proprioception    Deep Tendon Reflexes: 2+ bilateral biceps, triceps, brachioradialis, knee and ankle  Toes flexor bilaterally    Gait: normal and stable.      Other:    08-28    142  |  101  |  6<L>  ----------------------------<  87  5.6<H>   |  25  |  0.75    Ca    9.9      28 Aug 2017 15:32    TPro  7.5  /  Alb  4.1  /  TBili  0.2  /  DBili  x   /  AST  50<H>  /  ALT  19  /  AlkPhos  64  08-28 08-28    142  |  101  |  6<L>  ----------------------------<  87  5.6<H>   |  25  |  0.75    Ca    9.9      28 Aug 2017 15:32    TPro  7.5  /  Alb  4.1  /  TBili  0.2  /  DBili  x   /  AST  50<H>  /  ALT  19  /  AlkPhos  64  08-28                          11.5   4.87  )-----------( 213      ( 28 Aug 2017 15:32 )             37.6       Radiology    CT head         < from: CT Head No Cont (08.28.17 @ 16:16) >  INTERPRETATION:  History: Headache.    Multiple axial sections were performed from base of skull to vertex   without contrast enhancement. Coronal and sagittal reconstructions were   performed as well.    Area of vasogenic edema in the right parietal region is again seen and   slightly diminished in size.    There is no evidence acute hemorrhage in posterior fossa supratentorial   region    Evaluation of the osseous structures with theappropriate window again   demonstrates a high right parietal craniotomy.    The visualized paranasal sinuses mastoid and middle ear regions appear   clear    Impression: Vasogenic edema in the high right parietal region is again   seen though slightly diminished in size.    < end of copied text >

## 2017-08-28 NOTE — ED ADULT NURSE NOTE - OBJECTIVE STATEMENT
pt presents to ED TRC Aox4, in NAD, c/o sudden vision changes starting at 1330 today, described as "flashing lights" with r-sided frontal HA. States this is typical symptoms indicating need for repeat surgery. Denies CP/ SOB/ N/V/ dizziness/ weakness/ other acute complaints. VSS. IV inserted, BW collected and sent to lab. Awaiting neurosurg consult, awaiting Ct. Will continue to monitor.

## 2017-08-28 NOTE — ED PROVIDER NOTE - ATTENDING CONTRIBUTION TO CARE
Attending Attestation: Dr. Marks  I have personally performed a history and physical examination of the patient and discussed management with the resident as well as the patient.  I reviewed the resident's note and agree with the documented findings and plan of care.  I have authored and modified critical sections of the Provider Note, including but not limited to HPI, Physical Exam and MDM. Flashing lights in both visual fields, similar to prior brain met recurrences.  CTH, Neuro sx eval. Reassess.  Does not appear c/w retinal detachment, given hx.  But will also d/w neurosx.

## 2017-08-28 NOTE — ED CDU PROVIDER NOTE - UPPER EXTREMITY EXAM, LEFT
strength 4/5left upper extremity/REDUCED STRENGTH strength 4/5left upper extremity, this is her baseline/REDUCED STRENGTH

## 2017-08-28 NOTE — CONSULT NOTE ADULT - ASSESSMENT
57yof h/o breast CA s/p 2x craniotomies and gamma knife surgery for R occipital metastatic lesion. She presented today with flashing lights in her L visual field. CTH in ER shows improving vasogenic edema. She has been off decadron keppra for several weeks. She doesn't complain nausea, vomiting and headache. After she received dexamethasone in ED, her symptoms of flashing lights improved. She had baseline weakness in left upper extremity and left inferior temporal vision loss but had no new neurological deficits. Goals of  care discussion was made with family. Family and patient agreed for MRI in-patient and want to evaluate for any possible new lesion on MRI brain.     Plan     Admit to CDU  MRI brain with and without contrast 57yof h/o breast CA s/p 2x craniotomies and gamma knife surgery for R occipital metastatic lesion. She presented today with flashing lights in her L visual field. CTH in ER shows improving vasogenic edema. She has been off decadron keppra for several weeks. She doesn't complain nausea, vomiting and headache. After she received dexamethasone in ED, her symptoms of flashing lights improved. She had baseline weakness in left upper extremity and left inferior temporal vision loss but had no new neurological deficits. Goals of  care discussion was made with family. Family and patient agreed for MRI in-patient and want to evaluate for any possible new lesion on MRI brain.     Plan     Admit to CDU  MRI brain with and without contrast   agree with treatment with dexamethasone 57yof h/o breast CA s/p 2x craniotomies and gamma knife surgery for R occipital metastatic lesion. She presented today with flashing lights in her L visual field. CTH in ER shows improving vasogenic edema. She has been off decadron keppra for several weeks. She doesn't complain nausea, vomiting and headache. After she received dexamethasone in ED, her symptoms of flashing lights improved. She had baseline weakness in left upper extremity and left inferior temporal vision loss but had no new neurological deficits. Goals of  care discussion was made with family. Family and patient agreed for MRI in-patient and want to evaluate for any possible new lesion on MRI brain. Her symptoms of flashes of lights are most likely from seizure.     Plan     Admit to CDU  MRI brain with and without contrast   agree with treatment with dexamethasone and keppra

## 2017-08-29 VITALS
TEMPERATURE: 98 F | DIASTOLIC BLOOD PRESSURE: 66 MMHG | SYSTOLIC BLOOD PRESSURE: 101 MMHG | RESPIRATION RATE: 16 BRPM | OXYGEN SATURATION: 97 % | HEART RATE: 64 BPM

## 2017-08-29 LAB — HBA1C BLD-MCNC: 5.6 % — SIGNIFICANT CHANGE UP (ref 4–5.6)

## 2017-08-29 PROCEDURE — 99217: CPT

## 2017-08-29 PROCEDURE — 70553 MRI BRAIN STEM W/O & W/DYE: CPT | Mod: 26

## 2017-08-29 PROCEDURE — 99222 1ST HOSP IP/OBS MODERATE 55: CPT | Mod: GC

## 2017-08-29 RX ORDER — DEXAMETHASONE 0.5 MG/5ML
1 ELIXIR ORAL
Qty: 14 | Refills: 0 | OUTPATIENT
Start: 2017-08-29 | End: 2017-09-05

## 2017-08-29 RX ORDER — LEVETIRACETAM 250 MG/1
1 TABLET, FILM COATED ORAL
Qty: 60 | Refills: 0 | OUTPATIENT
Start: 2017-08-29 | End: 2017-09-28

## 2017-08-29 RX ADMIN — Medication 4 MILLIGRAM(S): at 13:35

## 2017-08-29 RX ADMIN — LEVETIRACETAM 400 MILLIGRAM(S): 250 TABLET, FILM COATED ORAL at 07:09

## 2017-08-29 RX ADMIN — Medication 4 MILLIGRAM(S): at 07:06

## 2017-08-29 NOTE — CONSULT NOTE ADULT - ASSESSMENT
Ms. Deluca is a 61 yo F w/ PMHx triple negative breast cancer (s/p bilateral mastectomy, mets to lung and brain, s/p Gamma Knife 4/2017 and 6/2017, s/p 2 craniotomies most recent in 7/2017) presented to the [facility] ER with visual changes x1 day. Her MRI is somewhat unclear and represents either progression vs pseudoprogression following treatment.  Her symptoms are mildly improved since receiving pain medication.  At this time, there is no role for acute radiation.  We have recommended a steroids 4 q 12 and will plan to follow up in the near future as an outpatient.  This was reviewed with the patient and all of her questions were addressed to her satisfaction.  Thank you for referring this patient to our attention and care.

## 2017-08-29 NOTE — CONSULT NOTE ADULT - SUBJECTIVE AND OBJECTIVE BOX
Ms. Deluca is a 59 yo F w/ PMHx triple negative breast cancer (s/p bilateral mastectomy, mets to lung and brain, s/p Gamma Knife 4/2017 and 6/2017, s/p 2 craniotomies most recent in 7/2017) presented to the [facility] ER with visual changes x1 day. Pt states this afternoon she saw flashing lights bilaterally, which is the same presentation she had one month ago prior to craniotomy, associated with a right sided mild headache. Pt denies chest pain, shortness of breath, diplopia, new onset weakness, tingling, numbness, difficulty speaking or any other concerns. Since receiving Keppra and decadron in main ED visual symptoms have resolved, still with mild headache.    An MRI with and without contrast performed on 8/29/2017 showed Postoperative  changes  within  the  right  occipital  region  with  decreasing vasogenic  edema  at  this  time.  Currently  there  is  nodular  and  plaque-like enhancement  along  the  dural  margin  which  has  increased  significantly  in  size  from  the  most  recent  exam.  While  the  appearance  is  strongly  suspicious  for  recurrent  tumor,  given  the  rapidity  of  progression  post  radiation  changes are a  possibility.    2  additional  small  metastases  which  have  increased  mildly  in  size  from  the  most  recent  exam.  One  lesion  was  not  seen  on  the  earlier  exam  from  June  while  the  second  lesion  was  larger  in  June.    Ms. Deluca denies any other complaints, or exacerbating symptoms.      ROS: [  ] Fever  [  ] Chills  [  ]Chest Pain [  ] SOB  [  ]Cough [  ] N/V  [  ] Diarrhea [  ]Constipation [  ]Other ROS:  [ X ] ROS otherwise negative      Allergies    No Known Allergies    Intolerances        PAST MEDICAL & SURGICAL HISTORY:  Brachial neuritis: left  Breast cancer  Breast cancer: left, mets to bone, lung, lymph nodes dx in 2014  mets to brain dx 2017  H/O brain surgery: craniotomy 3/2017  H/O bilateral mastectomy  No significant past surgical history  H/O bilateral mastectomy: 2014      FAMILY HISTORY:  No pertinent family history in first degree relatives  PAST MEDICAL & SURGICAL HISTORY:  Brachial neuritis: left  Breast cancer: left, mets to bone, lung, lymph nodes dx in 2014  mets to brain dx 2017  H/O brain surgery: craniotomy 3/2017  H/O bilateral mastectomy  H/O bilateral mastectomy: 2014      Vital Signs Last 24 Hrs  T(C): 36.5 (29 Aug 2017 14:41), Max: 37 (28 Aug 2017 21:14)  T(F): 97.7 (29 Aug 2017 14:41), Max: 98.6 (28 Aug 2017 21:14)  HR: 64 (29 Aug 2017 14:41) (64 - 89)  BP: 101/66 (29 Aug 2017 14:41) (101/66 - 129/80)  BP(mean): --  RR: 16 (29 Aug 2017 14:41) (15 - 17)  SpO2: 97% (29 Aug 2017 14:41) (97% - 100%)    I&O's Detail      General: Well Appearing     HEENT: [ x ]Nonicteric [  ]TAYLOR [  ]Other: visual deficits in central left field  CV: [ x ] RRR   [  ]Irregularly Irregular  [  ] No JVD [  ]Murmur:                      [  ]Other:  Lungs: [x  ]CTAB  [  ]Other:  ABD: [ x ]Soft  [  ]+Bowel Sounds  [  ] Nontender  [  ]NonDistended  [  ] No HSM   [  ] Other  Ext: [  ]2+Pulses  [  ]No C/C/E  [  ]Edema:              [  ] Other:  Skin: [ x ] Warm and Dry  [  ]Poor Skin Turgor  [  ]Rash:  : [  ]Cortes  [  ]Other:  Neuro: [ x ] AO x 3  [  ]Confused  [x  ]Non-focal  [  ]Encephalopathic  [ x ]CN II-XII Grossly Intact  [  ] Neuro Expanded:        LABS:                         11.5   4.87  )-----------( 213      ( 28 Aug 2017 15:32 )             37.6     08-28    142  |  101  |  6<L>  ----------------------------<  87  5.6<H>   |  25  |  0.75    Ca    9.9      28 Aug 2017 15:32    TPro  7.5  /  Alb  4.1  /  TBili  0.2  /  DBili  x   /  AST  50<H>  /  ALT  19  /  AlkPhos  64  08-28    PT/INR - ( 28 Aug 2017 15:30 )   PT: 10.3 SEC;   INR: 0.92          PTT - ( 28 Aug 2017 15:30 )  PTT:33.4 SEC

## 2017-09-12 PROCEDURE — 77263 THER RADIOLOGY TX PLNG CPLX: CPT

## 2017-09-19 ENCOUNTER — APPOINTMENT (OUTPATIENT)
Dept: NEUROSURGERY | Facility: CLINIC | Age: 57
End: 2017-09-19
Payer: MEDICAID

## 2017-09-19 VITALS
HEART RATE: 68 BPM | BODY MASS INDEX: 26.7 KG/M2 | WEIGHT: 136 LBS | HEIGHT: 60 IN | DIASTOLIC BLOOD PRESSURE: 81 MMHG | SYSTOLIC BLOOD PRESSURE: 120 MMHG

## 2017-09-19 DIAGNOSIS — Z92.3 PERSONAL HISTORY OF IRRADIATION: ICD-10-CM

## 2017-09-19 DIAGNOSIS — Z98.890 OTHER SPECIFIED POSTPROCEDURAL STATES: ICD-10-CM

## 2017-09-19 PROCEDURE — 99024 POSTOP FOLLOW-UP VISIT: CPT

## 2017-09-19 RX ORDER — MULTIVIT-MIN/FOLIC/VIT K/LYCOP 400-300MCG
25 MCG TABLET ORAL
Refills: 0 | Status: COMPLETED | COMMUNITY
End: 2017-09-19

## 2017-09-19 RX ORDER — MORPHINE SULFATE 15 MG/1
15 TABLET ORAL
Qty: 20 | Refills: 0 | Status: COMPLETED | COMMUNITY
Start: 2017-07-02 | End: 2017-09-19

## 2017-09-19 RX ORDER — GABAPENTIN 100 MG
100 TABLET ORAL
Refills: 0 | Status: COMPLETED | COMMUNITY
End: 2017-09-19

## 2017-09-19 RX ORDER — GABAPENTIN 300 MG/1
300 CAPSULE ORAL
Refills: 0 | Status: COMPLETED | COMMUNITY
End: 2017-09-19

## 2017-09-19 RX ORDER — IBUPROFEN 200 MG
600 CAPSULE ORAL
Refills: 0 | Status: COMPLETED | COMMUNITY
End: 2017-09-19

## 2017-09-19 RX ORDER — DEXAMETHASONE 2 MG/1
2 TABLET ORAL
Qty: 60 | Refills: 1 | Status: COMPLETED | COMMUNITY
Start: 2017-04-10 | End: 2017-09-19

## 2017-09-19 RX ORDER — LEVETIRACETAM 1000 MG/1
1000 TABLET, FILM COATED ORAL
Refills: 0 | Status: COMPLETED | COMMUNITY
End: 2017-09-19

## 2017-09-19 RX ORDER — DEXAMETHASONE 4 MG/1
4 TABLET ORAL
Refills: 0 | Status: COMPLETED | COMMUNITY
Start: 2017-03-28 | End: 2017-09-19

## 2017-09-20 ENCOUNTER — APPOINTMENT (OUTPATIENT)
Dept: OPHTHALMOLOGY | Facility: CLINIC | Age: 57
End: 2017-09-20
Payer: MEDICAID

## 2017-09-20 DIAGNOSIS — H53.462 HOMONYMOUS BILATERAL FIELD DEFECTS, LEFT SIDE: ICD-10-CM

## 2017-09-20 PROCEDURE — 92083 EXTENDED VISUAL FIELD XM: CPT

## 2017-09-20 PROCEDURE — 99204 OFFICE O/P NEW MOD 45 MIN: CPT

## 2017-09-20 RX ORDER — ACETAMINOPHEN 500 MG/1
500 TABLET, COATED ORAL
Refills: 0 | Status: ACTIVE | COMMUNITY

## 2017-10-10 ENCOUNTER — INPATIENT (INPATIENT)
Facility: HOSPITAL | Age: 57
LOS: 3 days | Discharge: HOME | End: 2017-10-14
Attending: STUDENT IN AN ORGANIZED HEALTH CARE EDUCATION/TRAINING PROGRAM | Admitting: STUDENT IN AN ORGANIZED HEALTH CARE EDUCATION/TRAINING PROGRAM
Payer: MEDICAID

## 2017-10-10 VITALS
HEART RATE: 78 BPM | SYSTOLIC BLOOD PRESSURE: 118 MMHG | DIASTOLIC BLOOD PRESSURE: 80 MMHG | RESPIRATION RATE: 18 BRPM | OXYGEN SATURATION: 100 %

## 2017-10-10 DIAGNOSIS — Z98.890 OTHER SPECIFIED POSTPROCEDURAL STATES: Chronic | ICD-10-CM

## 2017-10-10 DIAGNOSIS — Z90.13 ACQUIRED ABSENCE OF BILATERAL BREASTS AND NIPPLES: Chronic | ICD-10-CM

## 2017-10-10 DIAGNOSIS — C79.31 SECONDARY MALIGNANT NEOPLASM OF BRAIN: ICD-10-CM

## 2017-10-10 LAB
ALBUMIN SERPL ELPH-MCNC: 4 G/DL — SIGNIFICANT CHANGE UP (ref 3.3–5)
ALP SERPL-CCNC: 58 U/L — SIGNIFICANT CHANGE UP (ref 40–120)
ALT FLD-CCNC: 13 U/L — SIGNIFICANT CHANGE UP (ref 4–33)
AST SERPL-CCNC: 21 U/L — SIGNIFICANT CHANGE UP (ref 4–32)
BASE EXCESS BLDV CALC-SCNC: 3.9 MMOL/L — SIGNIFICANT CHANGE UP
BASOPHILS # BLD AUTO: 0.02 K/UL — SIGNIFICANT CHANGE UP (ref 0–0.2)
BASOPHILS NFR BLD AUTO: 0.3 % — SIGNIFICANT CHANGE UP (ref 0–2)
BILIRUB SERPL-MCNC: 0.3 MG/DL — SIGNIFICANT CHANGE UP (ref 0.2–1.2)
BLOOD GAS VENOUS - CREATININE: 0.42 MG/DL — LOW (ref 0.5–1.3)
BUN SERPL-MCNC: 6 MG/DL — LOW (ref 7–23)
CALCIUM SERPL-MCNC: 8.9 MG/DL — SIGNIFICANT CHANGE UP (ref 8.4–10.5)
CHLORIDE BLDV-SCNC: 104 MMOL/L — SIGNIFICANT CHANGE UP (ref 96–108)
CHLORIDE SERPL-SCNC: 102 MMOL/L — SIGNIFICANT CHANGE UP (ref 98–107)
CO2 SERPL-SCNC: 27 MMOL/L — SIGNIFICANT CHANGE UP (ref 22–31)
CREAT SERPL-MCNC: 0.52 MG/DL — SIGNIFICANT CHANGE UP (ref 0.5–1.3)
EOSINOPHIL # BLD AUTO: 0.01 K/UL — SIGNIFICANT CHANGE UP (ref 0–0.5)
EOSINOPHIL NFR BLD AUTO: 0.1 % — SIGNIFICANT CHANGE UP (ref 0–6)
GAS PNL BLDV: 138 MMOL/L — SIGNIFICANT CHANGE UP (ref 136–146)
GLUCOSE BLDV-MCNC: 110 — HIGH (ref 70–99)
GLUCOSE SERPL-MCNC: 107 MG/DL — HIGH (ref 70–99)
HCO3 BLDV-SCNC: 26 MMOL/L — SIGNIFICANT CHANGE UP (ref 20–27)
HCT VFR BLD CALC: 38.1 % — SIGNIFICANT CHANGE UP (ref 34.5–45)
HCT VFR BLDV CALC: 38.1 % — SIGNIFICANT CHANGE UP (ref 34.5–45)
HGB BLD-MCNC: 11.9 G/DL — SIGNIFICANT CHANGE UP (ref 11.5–15.5)
HGB BLDV-MCNC: 12.4 G/DL — SIGNIFICANT CHANGE UP (ref 11.5–15.5)
IMM GRANULOCYTES # BLD AUTO: 0.02 # — SIGNIFICANT CHANGE UP
IMM GRANULOCYTES NFR BLD AUTO: 0.3 % — SIGNIFICANT CHANGE UP (ref 0–1.5)
LACTATE BLDV-MCNC: 1 MMOL/L — SIGNIFICANT CHANGE UP (ref 0.5–2)
LYMPHOCYTES # BLD AUTO: 1.06 K/UL — SIGNIFICANT CHANGE UP (ref 1–3.3)
LYMPHOCYTES # BLD AUTO: 14.9 % — SIGNIFICANT CHANGE UP (ref 13–44)
MCHC RBC-ENTMCNC: 28.8 PG — SIGNIFICANT CHANGE UP (ref 27–34)
MCHC RBC-ENTMCNC: 31.2 % — LOW (ref 32–36)
MCV RBC AUTO: 92.3 FL — SIGNIFICANT CHANGE UP (ref 80–100)
MONOCYTES # BLD AUTO: 0.45 K/UL — SIGNIFICANT CHANGE UP (ref 0–0.9)
MONOCYTES NFR BLD AUTO: 6.3 % — SIGNIFICANT CHANGE UP (ref 2–14)
NEUTROPHILS # BLD AUTO: 5.54 K/UL — SIGNIFICANT CHANGE UP (ref 1.8–7.4)
NEUTROPHILS NFR BLD AUTO: 78.1 % — HIGH (ref 43–77)
NRBC # FLD: 0 — SIGNIFICANT CHANGE UP
PCO2 BLDV: 50 MMHG — SIGNIFICANT CHANGE UP (ref 41–51)
PH BLDV: 7.38 PH — SIGNIFICANT CHANGE UP (ref 7.32–7.43)
PLATELET # BLD AUTO: 242 K/UL — SIGNIFICANT CHANGE UP (ref 150–400)
PMV BLD: 8.8 FL — SIGNIFICANT CHANGE UP (ref 7–13)
PO2 BLDV: < 24 MMHG — LOW (ref 35–40)
POTASSIUM BLDV-SCNC: 3.9 MMOL/L — SIGNIFICANT CHANGE UP (ref 3.4–4.5)
POTASSIUM SERPL-MCNC: 4 MMOL/L — SIGNIFICANT CHANGE UP (ref 3.5–5.3)
POTASSIUM SERPL-SCNC: 4 MMOL/L — SIGNIFICANT CHANGE UP (ref 3.5–5.3)
PROT SERPL-MCNC: 7.1 G/DL — SIGNIFICANT CHANGE UP (ref 6–8.3)
RBC # BLD: 4.13 M/UL — SIGNIFICANT CHANGE UP (ref 3.8–5.2)
RBC # FLD: 16.8 % — HIGH (ref 10.3–14.5)
SAO2 % BLDV: 32.2 % — LOW (ref 60–85)
SODIUM SERPL-SCNC: 141 MMOL/L — SIGNIFICANT CHANGE UP (ref 135–145)
WBC # BLD: 7.1 K/UL — SIGNIFICANT CHANGE UP (ref 3.8–10.5)
WBC # FLD AUTO: 7.1 K/UL — SIGNIFICANT CHANGE UP (ref 3.8–10.5)

## 2017-10-10 PROCEDURE — 73502 X-RAY EXAM HIP UNI 2-3 VIEWS: CPT | Mod: 26,LT

## 2017-10-10 PROCEDURE — 70470 CT HEAD/BRAIN W/O & W/DYE: CPT | Mod: 26

## 2017-10-10 RX ORDER — SODIUM CHLORIDE 9 MG/ML
2000 INJECTION INTRAMUSCULAR; INTRAVENOUS; SUBCUTANEOUS ONCE
Qty: 0 | Refills: 0 | Status: COMPLETED | OUTPATIENT
Start: 2017-10-10 | End: 2017-10-10

## 2017-10-10 RX ORDER — MANNITOL
60 POWDER (GRAM) MISCELLANEOUS ONCE
Qty: 0 | Refills: 0 | Status: COMPLETED | OUTPATIENT
Start: 2017-10-10 | End: 2017-10-10

## 2017-10-10 RX ORDER — DEXAMETHASONE 0.5 MG/5ML
10 ELIXIR ORAL ONCE
Qty: 0 | Refills: 0 | Status: COMPLETED | OUTPATIENT
Start: 2017-10-10 | End: 2017-10-10

## 2017-10-10 RX ORDER — PANTOPRAZOLE SODIUM 20 MG/1
40 TABLET, DELAYED RELEASE ORAL
Qty: 0 | Refills: 0 | Status: DISCONTINUED | OUTPATIENT
Start: 2017-10-10 | End: 2017-10-11

## 2017-10-10 RX ORDER — ACETAMINOPHEN 500 MG
1000 TABLET ORAL ONCE
Qty: 0 | Refills: 0 | Status: COMPLETED | OUTPATIENT
Start: 2017-10-10 | End: 2017-10-10

## 2017-10-10 RX ORDER — ONDANSETRON 8 MG/1
4 TABLET, FILM COATED ORAL ONCE
Qty: 0 | Refills: 0 | Status: COMPLETED | OUTPATIENT
Start: 2017-10-10 | End: 2017-10-10

## 2017-10-10 RX ORDER — LEVETIRACETAM 250 MG/1
500 TABLET, FILM COATED ORAL
Qty: 0 | Refills: 0 | Status: DISCONTINUED | OUTPATIENT
Start: 2017-10-10 | End: 2017-10-11

## 2017-10-10 RX ORDER — GABAPENTIN 400 MG/1
300 CAPSULE ORAL THREE TIMES A DAY
Qty: 0 | Refills: 0 | Status: DISCONTINUED | OUTPATIENT
Start: 2017-10-10 | End: 2017-10-11

## 2017-10-10 RX ORDER — DOCUSATE SODIUM 100 MG
100 CAPSULE ORAL THREE TIMES A DAY
Qty: 0 | Refills: 0 | Status: DISCONTINUED | OUTPATIENT
Start: 2017-10-10 | End: 2017-10-11

## 2017-10-10 RX ADMIN — Medication 100 MILLIGRAM(S): at 21:50

## 2017-10-10 RX ADMIN — Medication 102 MILLIGRAM(S): at 20:57

## 2017-10-10 RX ADMIN — ONDANSETRON 4 MILLIGRAM(S): 8 TABLET, FILM COATED ORAL at 18:19

## 2017-10-10 RX ADMIN — Medication 600 GRAM(S): at 21:25

## 2017-10-10 RX ADMIN — Medication 400 MILLIGRAM(S): at 18:20

## 2017-10-10 RX ADMIN — SODIUM CHLORIDE 1000 MILLILITER(S): 9 INJECTION INTRAMUSCULAR; INTRAVENOUS; SUBCUTANEOUS at 18:19

## 2017-10-10 RX ADMIN — LEVETIRACETAM 500 MILLIGRAM(S): 250 TABLET, FILM COATED ORAL at 21:52

## 2017-10-10 RX ADMIN — GABAPENTIN 300 MILLIGRAM(S): 400 CAPSULE ORAL at 21:54

## 2017-10-10 RX ADMIN — Medication 1000 MILLIGRAM(S): at 18:45

## 2017-10-10 NOTE — ED ADULT NURSE NOTE - OBJECTIVE STATEMENT
changed into gown, iv start 20g right ac. labs drawn. c/o headache and nausea with vomiting. no iv bp left arm.

## 2017-10-10 NOTE — ED PROVIDER NOTE - CHPI ED SYMPTOMS NEG
no blurred vision/no dizziness/no change in level of consciousness/no numbness/no confusion/no fever/no loss of consciousness

## 2017-10-10 NOTE — H&P ADULT - NSHPLABSRESULTS_GEN_ALL_CORE
11.9   7.10  )-----------( 242      ( 10 Oct 2017 18:12 )             38.1   10-10    141  |  102  |  6<L>  ----------------------------<  107<H>  4.0   |  27  |  0.52    Ca    8.9      10 Oct 2017 18:12    TPro  7.1  /  Alb  4.0  /  TBili  0.3  /  DBili  x   /  AST  21  /  ALT  13  /  AlkPhos  58  10-10    Brain CT w&w/o contrast: Recurrent right occipital mass lesions with signifigant vasogenic edema, left to right shift, early uncal herniation

## 2017-10-10 NOTE — ED PROVIDER NOTE - ATTENDING CONTRIBUTION TO CARE
agree with PA note  57 year old female, PMHx of breast cancer, with mets to brain, bones, lung; presents to the ED for persistent headache and intractable vomiting x 2 weeks. Patient is s/p craniotomies x 5, last one done 8/31 with Dr. Estes for mets to the brain.  Presents with worsening headache and persistent vomiting which is indicative of increased pressure/mets.  States also had new onset left lower extremity weakness.    PE: not toxic appearing; PERRL; CTAB/L; s1 s2 no m/r/g abd soft/NT/ND Neuro: CNs intact 5/5 motor UE 5/5 RLE 3/5 LLE; sensation intact    CT head; NSG consult for likely increased edema/mets

## 2017-10-10 NOTE — H&P ADULT - NSHPPHYSICALEXAM_GEN_ALL_CORE
WDWN female in NAD  Vital Signs Last 24 Hrs  T(C): --  T(F): --  HR: 86 (10 Oct 2017 19:24) (78 - 86)  BP: 118/68 (10 Oct 2017 19:24) (118/68 - 118/80)  BP(mean): --  RR: 18 (10 Oct 2017 19:24) (18 - 18)  SpO2: 100% (10 Oct 2017 19:24) (100% - 100%)    AAO X 3  PERRLA, EOMI  Trace left facial weakness  CN 2-12 otherwise intact  Left UE 2/5 with spastic paresis left hand  Left JADE 2/5  Right UE/LE 5/5  SILT

## 2017-10-10 NOTE — ED ADULT NURSE NOTE - ED STAT RN HANDOFF DETAILS
pt. in NAD at this time, pt. admitted to SICU. report given to YARON Fuentes. pt. to be transported to MRI with Float RN and EDT prior transport to SICU.

## 2017-10-10 NOTE — H&P ADULT - HISTORY OF PRESENT ILLNESS
56 YO female with history of breast Ca, brain metastases, s/p multiple craniotomies for resection, s/p GKRT presents to ED with two weeks of progressively worsening headaches, nausea, vomiting, and left sided weakness, unable to ambulate X 2 weeks

## 2017-10-10 NOTE — ED PROVIDER NOTE - OBJECTIVE STATEMENT
57 year old female, PMHx of breast cancer, with mets to brain, bones, lung; presents to the ED for persistent headache and intractable vomiting x 2 weeks. Patient is s/p craniotomies x 5, last one done 8/31 with Dr. Estes for mets to the brain. She was feeling well after the first two weeks home from the surgery. For the past two weeks however, she describes a gradual onset, worsening headache described as pressure, generalized only partially relieved by oxycodone. She states when she has experienced headaches like this in the past it meant the pressure in her head was high from another tumor. She states she has been unable to tolerate PO and experiencing vomiting approximately 20 times a day for the last two weeks. She also notes generalized fatigue and weakness, as well as left lower extremity weakness with hip pain. She denies numbness, tingling, change in bowel/bladder habits, fevers, chills, diarrhea or other complaints.

## 2017-10-10 NOTE — ED ADULT TRIAGE NOTE - CHIEF COMPLAINT QUOTE
Pt c/o vomiting with headaches x 1 week. Pt c/o generalized pain and weakness. Had last craniotomy on 8/31. PMH: metastatic breast cancer, 3 craniotomies,

## 2017-10-10 NOTE — ED ADULT NURSE REASSESSMENT NOTE - NS ED NURSE REASSESS COMMENT FT1
rec'd pt. a&ox3, in NAD at this time, breathes with ease, Mannitol infusing via g20 saline lock on rt ac with no ss of infiltration. c/o mild pain on head, MD aware. denies any n/v at this time. due meds given as ordered. awaits SICU eval. will continue to monitor

## 2017-10-10 NOTE — H&P ADULT - PROBLEM SELECTOR PLAN 1
Admit to ICU for neurologic checks Q1H  Dexamethasone 6mg Q6H  Stereotactic brain MRI/MRV  Preop for resection tomorrow  Maintain serum Na 145-150

## 2017-10-10 NOTE — ED PROVIDER NOTE - NEUROLOGICAL MOTOR HIPFLEXION LEFT
3/5 MOVEMENT AGAINST GRAVITY, BUT NOT AGAINST ADDED RESISTANCE./unable to hold left leg against gravity

## 2017-10-11 ENCOUNTER — RESULT REVIEW (OUTPATIENT)
Age: 57
End: 2017-10-11

## 2017-10-11 DIAGNOSIS — R42 DIZZINESS AND GIDDINESS: ICD-10-CM

## 2017-10-11 DIAGNOSIS — Z71.89 OTHER SPECIFIED COUNSELING: ICD-10-CM

## 2017-10-11 DIAGNOSIS — R51 HEADACHE: ICD-10-CM

## 2017-10-11 DIAGNOSIS — R11.2 NAUSEA WITH VOMITING, UNSPECIFIED: ICD-10-CM

## 2017-10-11 DIAGNOSIS — Z51.5 ENCOUNTER FOR PALLIATIVE CARE: ICD-10-CM

## 2017-10-11 LAB
APPEARANCE UR: CLEAR — SIGNIFICANT CHANGE UP
APTT BLD: 27.8 SEC — SIGNIFICANT CHANGE UP (ref 27.5–37.4)
BILIRUB UR-MCNC: NEGATIVE — SIGNIFICANT CHANGE UP
BLD GP AB SCN SERPL QL: NEGATIVE — SIGNIFICANT CHANGE UP
BLOOD UR QL VISUAL: NEGATIVE — SIGNIFICANT CHANGE UP
BUN SERPL-MCNC: 4 MG/DL — LOW (ref 7–23)
CALCIUM SERPL-MCNC: 8.3 MG/DL — LOW (ref 8.4–10.5)
CHLORIDE SERPL-SCNC: 103 MMOL/L — SIGNIFICANT CHANGE UP (ref 98–107)
CO2 SERPL-SCNC: 22 MMOL/L — SIGNIFICANT CHANGE UP (ref 22–31)
COLOR SPEC: SIGNIFICANT CHANGE UP
CREAT SERPL-MCNC: 0.54 MG/DL — SIGNIFICANT CHANGE UP (ref 0.5–1.3)
GLUCOSE SERPL-MCNC: 144 MG/DL — HIGH (ref 70–99)
GLUCOSE UR-MCNC: NEGATIVE — SIGNIFICANT CHANGE UP
HCG UR-SCNC: NEGATIVE — SIGNIFICANT CHANGE UP
HCT VFR BLD CALC: 36.6 % — SIGNIFICANT CHANGE UP (ref 34.5–45)
HGB BLD-MCNC: 11.6 G/DL — SIGNIFICANT CHANGE UP (ref 11.5–15.5)
INR BLD: 0.95 — SIGNIFICANT CHANGE UP (ref 0.88–1.17)
KETONES UR-MCNC: SIGNIFICANT CHANGE UP
LEUKOCYTE ESTERASE UR-ACNC: NEGATIVE — SIGNIFICANT CHANGE UP
MAGNESIUM SERPL-MCNC: 2.1 MG/DL — SIGNIFICANT CHANGE UP (ref 1.6–2.6)
MCHC RBC-ENTMCNC: 29.2 PG — SIGNIFICANT CHANGE UP (ref 27–34)
MCHC RBC-ENTMCNC: 31.7 % — LOW (ref 32–36)
MCV RBC AUTO: 92.2 FL — SIGNIFICANT CHANGE UP (ref 80–100)
MUCOUS THREADS # UR AUTO: SIGNIFICANT CHANGE UP
NITRITE UR-MCNC: NEGATIVE — SIGNIFICANT CHANGE UP
NON-SQ EPI CELLS # UR AUTO: <1 — SIGNIFICANT CHANGE UP
NRBC # FLD: 0 — SIGNIFICANT CHANGE UP
PH UR: 6.5 — SIGNIFICANT CHANGE UP (ref 4.6–8)
PHOSPHATE SERPL-MCNC: 1.8 MG/DL — LOW (ref 2.5–4.5)
PLATELET # BLD AUTO: 228 K/UL — SIGNIFICANT CHANGE UP (ref 150–400)
PMV BLD: 8.9 FL — SIGNIFICANT CHANGE UP (ref 7–13)
POTASSIUM SERPL-MCNC: 4.5 MMOL/L — SIGNIFICANT CHANGE UP (ref 3.5–5.3)
POTASSIUM SERPL-SCNC: 4.5 MMOL/L — SIGNIFICANT CHANGE UP (ref 3.5–5.3)
PROT UR-MCNC: NEGATIVE — SIGNIFICANT CHANGE UP
PROTHROM AB SERPL-ACNC: 10.6 SEC — SIGNIFICANT CHANGE UP (ref 9.8–13.1)
RBC # BLD: 3.97 M/UL — SIGNIFICANT CHANGE UP (ref 3.8–5.2)
RBC # FLD: 16.9 % — HIGH (ref 10.3–14.5)
RBC CASTS # UR COMP ASSIST: SIGNIFICANT CHANGE UP (ref 0–?)
RH IG SCN BLD-IMP: POSITIVE — SIGNIFICANT CHANGE UP
SODIUM SERPL-SCNC: 140 MMOL/L — SIGNIFICANT CHANGE UP (ref 135–145)
SP GR SPEC: 1.02 — SIGNIFICANT CHANGE UP (ref 1–1.03)
SQUAMOUS # UR AUTO: SIGNIFICANT CHANGE UP
UROBILINOGEN FLD QL: NORMAL E.U. — SIGNIFICANT CHANGE UP (ref 0.1–0.2)
WBC # BLD: 7.01 K/UL — SIGNIFICANT CHANGE UP (ref 3.8–10.5)
WBC # FLD AUTO: 7.01 K/UL — SIGNIFICANT CHANGE UP (ref 3.8–10.5)
WBC UR QL: SIGNIFICANT CHANGE UP (ref 0–?)

## 2017-10-11 PROCEDURE — 61510 CRNEC TREPH EXC BRN TUM STTL: CPT

## 2017-10-11 PROCEDURE — 61781 SCAN PROC CRANIAL INTRA: CPT

## 2017-10-11 PROCEDURE — 93010 ELECTROCARDIOGRAM REPORT: CPT

## 2017-10-11 PROCEDURE — 70544 MR ANGIOGRAPHY HEAD W/O DYE: CPT | Mod: 26,59

## 2017-10-11 PROCEDURE — 99223 1ST HOSP IP/OBS HIGH 75: CPT | Mod: GC

## 2017-10-11 PROCEDURE — 99233 SBSQ HOSP IP/OBS HIGH 50: CPT

## 2017-10-11 PROCEDURE — 70553 MRI BRAIN STEM W/O & W/DYE: CPT | Mod: 26

## 2017-10-11 PROCEDURE — 88307 TISSUE EXAM BY PATHOLOGIST: CPT | Mod: 26

## 2017-10-11 RX ORDER — ACETAMINOPHEN 500 MG
1000 TABLET ORAL ONCE
Qty: 0 | Refills: 0 | Status: COMPLETED | OUTPATIENT
Start: 2017-10-11 | End: 2017-10-11

## 2017-10-11 RX ORDER — SODIUM CHLORIDE 9 MG/ML
1000 INJECTION INTRAMUSCULAR; INTRAVENOUS; SUBCUTANEOUS
Qty: 0 | Refills: 0 | Status: DISCONTINUED | OUTPATIENT
Start: 2017-10-11 | End: 2017-10-11

## 2017-10-11 RX ORDER — DEXAMETHASONE 0.5 MG/5ML
4 ELIXIR ORAL EVERY 6 HOURS
Qty: 0 | Refills: 0 | Status: DISCONTINUED | OUTPATIENT
Start: 2017-10-11 | End: 2017-10-13

## 2017-10-11 RX ORDER — SODIUM CHLORIDE 9 MG/ML
1000 INJECTION INTRAMUSCULAR; INTRAVENOUS; SUBCUTANEOUS
Qty: 0 | Refills: 0 | Status: DISCONTINUED | OUTPATIENT
Start: 2017-10-11 | End: 2017-10-13

## 2017-10-11 RX ORDER — LEVETIRACETAM 250 MG/1
500 TABLET, FILM COATED ORAL
Qty: 0 | Refills: 0 | Status: DISCONTINUED | OUTPATIENT
Start: 2017-10-11 | End: 2017-10-14

## 2017-10-11 RX ORDER — INFLUENZA VIRUS VACCINE 15; 15; 15; 15 UG/.5ML; UG/.5ML; UG/.5ML; UG/.5ML
0.5 SUSPENSION INTRAMUSCULAR ONCE
Qty: 0 | Refills: 0 | Status: DISCONTINUED | OUTPATIENT
Start: 2017-10-11 | End: 2017-10-14

## 2017-10-11 RX ORDER — ACETAMINOPHEN 500 MG
1000 TABLET ORAL ONCE
Qty: 0 | Refills: 0 | Status: DISCONTINUED | OUTPATIENT
Start: 2017-10-11 | End: 2017-10-12

## 2017-10-11 RX ADMIN — SODIUM CHLORIDE 100 MILLILITER(S): 9 INJECTION INTRAMUSCULAR; INTRAVENOUS; SUBCUTANEOUS at 01:54

## 2017-10-11 RX ADMIN — LEVETIRACETAM 400 MILLIGRAM(S): 250 TABLET, FILM COATED ORAL at 08:00

## 2017-10-11 RX ADMIN — Medication 4 MILLIGRAM(S): at 12:30

## 2017-10-11 RX ADMIN — Medication 63.75 MILLIMOLE(S): at 06:02

## 2017-10-11 RX ADMIN — GABAPENTIN 300 MILLIGRAM(S): 400 CAPSULE ORAL at 06:02

## 2017-10-11 RX ADMIN — Medication 100 MILLIGRAM(S): at 06:03

## 2017-10-11 RX ADMIN — Medication 1000 MILLIGRAM(S): at 02:30

## 2017-10-11 RX ADMIN — SODIUM CHLORIDE 75 MILLILITER(S): 9 INJECTION INTRAMUSCULAR; INTRAVENOUS; SUBCUTANEOUS at 06:31

## 2017-10-11 RX ADMIN — Medication 400 MILLIGRAM(S): at 01:54

## 2017-10-11 NOTE — PROGRESS NOTE ADULT - SUBJECTIVE AND OBJECTIVE BOX
SICU PM PROGRESS NOTE  ===============================  Interval Events:   No acute events     HPI  57F PMHx breast CA s/p b/l mastectomy (2014) & chemoradiation, brain metastesis s/p multiple craniotomies and radiation, presented to [facility] ED on 10/9/17 complaining of 2 week history of headache, L-sided weakness and difficulty walking. Head CT scan performed in ED was concerning for progression of R parieto-occipital dural metastic disease, vasogenic edema w/ mass effect, effacement of R lateral ventricle & basal cisterns, evidence of subfalcine herniation as well as early transtentorial herniation. Patient was given Mannitol & steroids. She was admitted to Neurosurgery for planned OR today. A stereo tactic brain MRI was performed in preparation for the OR.     SICU was consulted for admission to ICU q1 neurologic checks.         VITAL SIGNS, INS/OUTS (last 24 hours):   --------------------------------------------------------------------------------------  T(C): 35.7 (10-11-17 @ 08:00), Max: 37 (10-10-17 @ 22:25)  HR: 75 (10-11-17 @ 12:00) (62 - 86)  BP: 138/92 (10-11-17 @ 12:00) (99/67 - 138/92)  BP(mean): 99 (10-11-17 @ 12:00) (71 - 100)  ABP: --  ABP(mean): --  RR: 14 (10-11-17 @ 12:00) (12 - 18)  SpO2: 99% (10-11-17 @ 12:00) (98% - 100%)  Wt(kg): --  CVP(mm Hg): --  CI: --  CAPILLARY BLOOD GLUCOSE       N/A      10-11 @ 07:01  -  10-11 @ 13:07  --------------------------------------------------------  IN:    sodium chloride 0.9%.: 450 mL  Total IN: 450 mL    OUT:    Voided: 325 mL  Total OUT: 325 mL    Total NET: 125 mL        --------------------------------------------------------------------------------------    EXAM  Exam: A&Ox3; speech fluid. 2/5 strength of LUE & LLE    RESPIRATORY  Mechanical Ventilation: n/a    Exam: breathing comfortably on RA; b/l breath sounds      CARDIOVASCULAR  -no active issues    Exam: regular, no extra heart sounds  Cardiac Rhythm: regular, sinus on telemetry    GI/NUTRITION  Exam: soft, NT/ND  Diet: NPO    VASCULAR  Exam: Extremities warm, pink, well-perfused.     MUSCULOSKELETAL  Exam: All extremities moving spontaneously without limitations.     SKIN  Exam: Good skin turgor, no skin breakdown.      METABOLIC/FLUIDS/ELECTROLYTES  sodium chloride 0.9%. 1000 milliLiter(s) IV Continuous <Continuous>      HEMATOLOGIC  [x] DVT Prophylaxis:   Transfusions:	[] PRBC	[] Platelets		[] FFP	[] Cryoprecipitate    INFECTIOUS DISEASE  Antimicrobials/Immunologic Medications:  influenza   Vaccine 0.5 milliLiter(s) IntraMuscular once    Day #  	of   ***    LABS  --------------------------------------------------------------------------------------  CBC (10-11 @ 02:10)                              11.6                           7.01    )----------------(  228        --    % Neutrophils, --    % Lymphocytes, ANC: --                                  36.6                CBC (10-10 @ 18:12)                              11.9                           7.10    )----------------(  242        78.1<H>% Neutrophils, 14.9  % Lymphocytes, ANC: 5.54                                38.1                  BMP (10-11 @ 02:10)             140     |  103     |  4<L>  		Ca++ --      Ca 8.3<L>             ---------------------------------( 144<H>		Mg 2.1                4.5     |  22      |  0.54  			Ph 1.8<L>  BMP (10-10 @ 18:12)             141     |  102     |  6<L>  		Ca++ --      Ca 8.9                ---------------------------------( 107<H>		Mg --                 4.0     |  27      |  0.52  			Ph --        LFTs (10-10 @ 18:12)      TPro 7.1 / Alb 4.0 / TBili 0.3 / DBili -- / AST 21 / ALT 13 / AlkPhos 58    Coags (10-11 @ 02:10)  aPTT 27.8 / INR 0.95 / PT 10.6    ABG (10-10 @ 18:12)      /  /  /  /  / %     Lactate:   1.0    VBG (10-10 @ 18:12)     7.38 / 50 / < 24<L> / 26 / 3.9 / 32.2<L>%      --------------------------------------------------------------------------------------    IMAGING STUDIES    ASSESSMENT  57F PMHx Breast CA w/ brain metastasis s/p multiple craniotomies p/w 2wk history of headache, L sided weakness and difficulty walking on 10/10/17, on CT imaging found to have progression of R dural metastatic disease w/ worsening vasogenic edema resulting in mass effect and signs of early herniation. Admitted to SICU for q1 neuro checks in anticipation of OR today.    Neuro: edema, mass effect & evidence of herniation 2/2 metastatic breast CA of brain   -con't keppra and decadron  -pre-op for OR today (T&S, coags, CBC, MRI)  -maintain Na>140-150 (per neurosurgery PA); will start NaCl 3% as necessary  -holding home gabapentin and levetiracetam    Resp: no acute issues  -encourage OOB & IS    CV: no acute issues  -monitor closely for HTN in setting of early herniation    GI: no acute issues  -NPO for OR  -holding home stool softener, colace and protonix    :   -strict I&O  -NS @75/h  -f/u repeat UA    Heme: DVTppx  -holding chemical ppx in setting of OR  -start lovenox when appropriate post-op    ID: no acute issues    Endo:   -holding home vit D3 and venkat 600 (calcium supplement)    Lines:   -PIV    Dispo:   -FULL code  -con't SICU care pending OR      (Please contact [facility] SICU resident [id number], or PA [phone] with questions/concerns.)      --------------------------------------------------------------------------------------    Critical Care Diagnoses:

## 2017-10-11 NOTE — CONSULT NOTE ADULT - PROBLEM SELECTOR RECOMMENDATION 2
No relief with oxycodone at home  Relieved with dexamethasone on admission; will continue current regimen  Planned to undergo resection of recurrent mets; has had multiple similar procedures before

## 2017-10-11 NOTE — PROGRESS NOTE ADULT - SUBJECTIVE AND OBJECTIVE BOX
SICU Daily Progress Note  =====================================================  Interval/Overnight Events:     ***    POD #          	SICU Day #    ***    HPI:  ((insert from previous note)) ***    PMH:   ***    Allergies: No Known Allergies      MEDICATIONS:   --------------------------------------------------------------------------------------  Neurologic Medications  gabapentin 300 milliGRAM(s) Oral three times a day  levETIRAcetam 500 milliGRAM(s) Oral two times a day    Respiratory Medications    Cardiovascular Medications    Gastrointestinal Medications  docusate sodium 100 milliGRAM(s) Oral three times a day  pantoprazole    Tablet 40 milliGRAM(s) Oral before breakfast  sodium chloride 0.9%. 1000 milliLiter(s) IV Continuous <Continuous>    Genitourinary Medications    Hematologic/Oncologic Medications  influenza   Vaccine 0.5 milliLiter(s) IntraMuscular once    Antimicrobial/Immunologic Medications    Endocrine/Metabolic Medications    Topical/Other Medications    --------------------------------------------------------------------------------------    VITAL SIGNS, INS/OUTS (last 24 hours):  --------------------------------------------------------------------------------------  T(C): 35.8 (10-11-17 @ 05:00), Max: 37 (10-10-17 @ 22:25)  HR: 78 (10-11-17 @ 06:00) (64 - 86)  BP: 103/65 (10-11-17 @ 06:00) (103/65 - 138/82)  BP(mean): 74 (10-11-17 @ 06:00) (74 - 93)  ABP: --  ABP(mean): --  RR: 12 (10-11-17 @ 06:00) (12 - 18)  SpO2: 99% (10-11-17 @ 06:00) (99% - 100%)  Wt(kg): --  CVP(mm Hg): --  CI: --  CAPILLARY BLOOD GLUCOSE       N/A      10-10 @ 07:01  -  10-11 @ 06:15  --------------------------------------------------------  IN:    sodium chloride 0.9%.: 300 mL  Total IN: 300 mL    OUT:  Total OUT: 0 mL    Total NET: 300 mL        --------------------------------------------------------------------------------------    EXAM  NEUROLOGY  RASS:   	GCS:    Exam: Normal, NAD, alert, oriented x3, no focal deficits. ***    HEENT  Exam: Normocephalic, atraumatic, EOMI.  ***    RESPIRATORY  Exam: Lungs clear to auscultation, Normal expansion/effort. ***  Mechanical Ventilation:     CARDIOVASCULAR  Exam: S1, S2.  Regular rate and rhythm.  Peripheral edema ***    GI/NUTRITION  Exam: Abdomen soft, Non-tender, Non-distended.  Gastrostomy / Jejunostomy / Nasogastric tube in place.  Colostomy / Ileostomy.  ***  Wound:  ***  Current Diet:  NPO***    VASCULAR  Exam: Extremities warm, pink, well-perfused. ***    MUSCULOSKELETAL  Exam: All extremities moving spontaneously without limitations. ***    SKIN  Exam: Good skin turgor, no skin breakdown. ***    METABOLIC/FLUIDS/ELECTROLYTES  sodium chloride 0.9%. 1000 milliLiter(s) IV Continuous <Continuous>      HEMATOLOGIC  [x] VTE Prophylaxis:   Transfusions:	[] PRBC	[] Platelets		[] FFP	[] Cryoprecipitate    INFECTIOUS DISEASE  Antimicrobials/Immunologic Medications:  influenza   Vaccine 0.5 milliLiter(s) IntraMuscular once    Day #      of     ***    Tubes/Lines/Drains  ***  [x] Peripheral IV  [] Central Venous Line     	[] R	[] L	[] IJ	[] Fem	[] SC	Date Placed:   [] Arterial Line		[] R	[] L	[] Fem	[] Rad	[] Ax	Date Placed:   [] PICC		[] Midline		[] Mediport  [] Urinary Catheter		Date Placed:   [x] Necessity of urinary, arterial, and venous catheters discussed    LABS  --------------------------------------------------------------------------------------  CBC (10-11 @ 02:10)                              11.6                           7.01    )----------------(  228        --    % Neutrophils, --    % Lymphocytes, ANC: --                                  36.6                CBC (10-10 @ 18:12)                              11.9                           7.10    )----------------(  242        78.1<H>% Neutrophils, 14.9  % Lymphocytes, ANC: 5.54                                38.1                  BMP (10-11 @ 02:10)             140     |  103     |  4<L>  		Ca++ --      Ca 8.3<L>             ---------------------------------( 144<H>		Mg 2.1                4.5     |  22      |  0.54  			Ph 1.8<L>  BMP (10-10 @ 18:12)             141     |  102     |  6<L>  		Ca++ --      Ca 8.9                ---------------------------------( 107<H>		Mg --                 4.0     |  27      |  0.52  			Ph --        LFTs (10-10 @ 18:12)      TPro 7.1 / Alb 4.0 / TBili 0.3 / DBili -- / AST 21 / ALT 13 / AlkPhos 58    Coags (10-11 @ 02:10)  aPTT 27.8 / INR 0.95 / PT 10.6    ABG (10-10 @ 18:12)      /  /  /  /  / %     Lactate:   1.0    VBG (10-10 @ 18:12)     7.38 / 50 / < 24<L> / 26 / 3.9 / 32.2<L>%      --------------------------------------------------------------------------------------    OTHER LABORATORY:     IMAGING STUDIES:   CXR:     ASSESSMENT:  57y Female    ((insert from previous))***    PLAN:   ***  Neurologic:   Respiratory:   Cardiovascular:   Gastrointestinal/Nutrition:   Renal/Genitourinary:   Hematologic:   Infectious Disease:   Tubes/Lines/Drains:   Endocrine:   Disposition:     --------------------------------------------------------------------------------------    Critical Care Diagnoses:

## 2017-10-11 NOTE — PROGRESS NOTE ADULT - SUBJECTIVE AND OBJECTIVE BOX
Visit Summary: Patient seen and evaluated at bedside. MRI brain w/ contrast demonstrates interval growth of recurrent R occipital metastasis w/ significant mass effect and vasogenic edema. MRV shows patent sinuses w/ no obvious invasion by tumor. Patient remains wide awake and alert w/ stable L sided weakness. She is preopped for OR today for resection of occipital met and has consented to surgery, understanding the risks and benefits of the procedure.    Overnight Events: no acute events o/n    Exam:  T(C): 35.8 (10-11-17 @ 05:00), Max: 37 (10-10-17 @ 22:25)  HR: 74 (10-11-17 @ 07:00) (64 - 86)  BP: 111/95 (10-11-17 @ 07:00) (103/65 - 138/82)  RR: 15 (10-11-17 @ 07:00) (12 - 18)  SpO2: 100% (10-11-17 @ 07:00) (99% - 100%)  Wt(kg): --    AAOx3, EOS, FC  R pupil 3mm and reactive, L pupil 4mm and sluggish  face symmetric  5/5 on R side  4/5 proximal LUE, 3/5 distal LUE  4/5 LLE                        11.6   7.01  )-----------( 228      ( 11 Oct 2017 02:10 )             36.6     10-11    140  |  103  |  4<L>  ----------------------------<  144<H>  4.5   |  22  |  0.54    Ca    8.3<L>      11 Oct 2017 02:10  Phos  1.8     10-11  Mg     2.1     10-11    TPro  7.1  /  Alb  4.0  /  TBili  0.3  /  DBili  x   /  AST  21  /  ALT  13  /  AlkPhos  58  10-10  PT/INR - ( 11 Oct 2017 02:10 )   PT: 10.6 SEC;   INR: 0.95          PTT - ( 11 Oct 2017 02:10 )  PTT:27.8 SEC

## 2017-10-11 NOTE — PROGRESS NOTE ADULT - ASSESSMENT
This is a 58 yo F w/ hx of triple negative breast cancer, s/p craniotomy for resection of occipital met x 2, s/p SRS x3, who p/w recurrence of R occipital met w/ vasogenic edema and mass effect. Patient is preopped for OR today.    q1 neuro checks  decadron 6q6  NPO for OR  SICU after surgery

## 2017-10-11 NOTE — CHART NOTE - NSCHARTNOTEFT_GEN_A_CORE
October 11th, 2017  5 AM    Hospital Day #1  SICU Day #1    Initial SICU Consultation    I was asked to evaluate this patient, provide a Surgical Critical Care Consultation, and provide ongoing care and monitoring. The patient’s chart is reviewed and she is examined.    This patient is a 57-year-old right-handed female who presented to the ED at [facility] at approximately 5 PM on 10/10/17 with complaints of having a headache. The headache had been present for one - two weeks prior to the current presentation and was accompanied with anorexia, nausea, and emesis. She also had generalized pain, weakness, and fatigue.    She has a medical history significant for having triple negative breast cancer first diagnosed in 2014. She has been found to be BRCA negative. She has had bilateral mastectomies was subsequently treated with chemotherapy and radiation therapy. She has been treated with Xeloda and more recently Gamzar. She has had metastasis to her lymph nodes, bone, lung, and subsequently to her brain. She has had multiple craniotomies to treat her metastasis with the last operation occurring on 8/31/17.  She is known to have left sided brachial neuritis. She was evaluated in the ED at [facility] on 2/15/16 for complaints of dyspnea, right sided chest pain, and a productive cough. A CT scan revealed mediastinal adenopathy and a right pleural effusion. She improved and was discharged to home. She presented to the ED at [facility] on 3/13/17 with complaints of having a headache and was found to have brain metastasis. She was transferred to the Neurosurgery Service at [facility]. She was hospitalized on the Neurosurgery Service at [facility] from 3/14/17 – 3/22/17 for evaluation and treatment of multiple brain metastases. On 3/15/17 she had a right occipital craniotomy and resection of metastasis. Plans were for her to have adjuvant brain radiation therapy after hospital discharge. On 4/6/17 she was evaluated in the ED at [facility] for complaints of a headache, swelling at her surgical site, fingers, and toes, and numbness of her tongue. Reportedly during a gamma knife treatment the patient felt a popping from the craniotomy bone flap on 6/12/17. She was evaluated in the ED at [facility] on 7/1/17 with complaints of having a right sided headache as well as blurry vision bilaterally. She was found to have a displaced craniotomy flap. An ophthalmology consultation was requested and it was determined that the patient did not have a retinal detachment. The patient was hospitalized on the Neurosurgery Service here at [facility] from 7/12/17 – 7/13/17 for repair of her depressed skull flap. On 7/12/17 she underwent a craniotomy and elevation of the skull flap. She again presented to the ED here at [facility] at 5 PM on 7/28/17 with disorientation. She had a worsening gait abnormality and the disorientation began a few hours prior to evaluation and was accompanied with visual changes as well as emesis. When she presented to the ED a "Code Stroke" was activated and a CT scan and an MRI revealed a vasogenic (brain) edema in the right occipito-parietal region. It was thought that no neurosurgical intervention was required at the time of hospitalization and she was admitted to the Medical Service. She was hospitalized on the Medical Service and subsequently the Neurosurgery Service here at [facility] from 7/28/17 – 8/2/17 for evaluation and treatment of ataxia, blurry vision, headaches, nausea, and emesis. She was found to have recurrence of tumor at the site of the recent resection and on 7/31/17 had a right occipital craniotomy and resection of a brain metastasis. She again presented to the ED here a [facility] on 8/28/17 with complaints of having flashing lights bilaterally that was thought to be due to a seizure. Imaging tests revealed either progression of her metastasis or pseudo-progression due to treatment. She was treated with steroids, monitored in the CDU, and then discharged to home. Prior to the current hospitalization she took:    1)	Decadron	2)	Keppra		3)	Gabapentin	4)	Oxycodone  5)	Tylenol		6)	Protonix	7)	Colace    She has no known medication allergies and she is not allergic to latex. She does not abuse either ethanol or tobacco. She is  and resides with her spouse, Scot Deluca ([phone]) in an apartment in [place] [place] [facility] [place]. There are three outside steps needed to enter the residence. She has an adult daughter, Laura Simmons ([phone]). The patient has two older siblings, a brother and a sister who are reportedly healthy. No family members have had breast cancer or ovarian cancer. The patient has a family history significant for a cousin who had seizures apparently due to anesthesia. 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 The patient has previously appointed her spouse to be her healthcare agent / proxy. The patient has home health aides eight hours per day six days a week. She has decreased vision and wears corrective lenses. She has had headaches that were accompanied with anorexia, nausea, and emesis. She had a gait abnormality with difficulty ambulating due to imbalance. She has previously used a cane, a rolling walker, and a wheelchair. She has chronic paresthesias in the left upper extremity.  She has had bilateral knee pain and left upper extremity weakness and inability to move her fingers. Her 10-point review of systems was otherwise negative. Her oncologist is Dr. Byrd.    On presentation to the ED she had a:    BP	=	118/80  P	=	78  R	=	18  Temp	=	-  O2 Sat	=	100%  VAS	=	5/10    She was awake, alert, and fully oriented. She was in no acute distress and appeared ill. She did not appear toxic.  She was anicteric. She had reactive pupils and her extra-occular movements were intact. There was a trace of left facial weakness.  She did not have thrush. Her oropharyngeal mucosa was normal.  She did not have JVD. There was no abnormal cervical adenopathy.  She had clear lungs bilaterally and she had non-labored respirations. She had symmetrical chest wall movements and she had healed mastectomy scars. She has a right sided Mediport (in her chest wall).  She had regular heart tones and she did not have a murmur.  Her abdomen was soft and neither distended nor tender. She did not have guarding or rebound. There were no palpable masses or abdominal wall hernias. She had active bowel sounds. There was no CVA tenderness.  Her extremities were well perfused. She did not have a rash.  She was unable to ambulate except with assistance and she had decreased strength in her left lower extremity. She had contractures of the left hand with decreased motion and sensation. Her musculoskeletal exam was otherwise normal.     A 20 gauge IV cannula was inserted into a vein of her right antecubital fossa and fluid was given. Laboratory tests revealed a:    WBC	=	7	Na		=	141	Bilirubin		=	0.3  Neutro	=	78%	K		=	4.0	Alk Phos	=	58  Hgb	=	12	Cl		=	102	SGOT		=	21  Hct	=	38%	HCO3		=	27	SGPT		=	13  Plts	=	242	Glucose	=	107  			BUN		=	6  PT	=	-	Creat		=	0.5  PTT	=	-  INR	=	-	Albumin	=	4.0    pH	=	7.38	Lactate		=	1.0  pCO2	=	50  pO2	<	24  BE	=	3.9    A CT scan of her brain revealed extensive vasogenic brain edema in the right parieto-occipital region. She had right to left midline shift with effacement of the right lateral ventricle. She was thought to have progression of dural disease (neoplasm) with worsening brain edema.    While she was in the ED she was given decadron and mannitol. She had a:    BP	=	118 - 133/38 - 86  P	=	72 - 86  R	=	15 - 18  Temp	=	37.0  O2 Sat	=	99% - 100%  VAS	=	2/10 - 7/10    Prior to transport to the SICU a stereotactic MRI of her brain and an MR venogram were obtained both of which are pending evaluation by the radiologist.     Radiographs of her pelvis and left hip failed to reveal any acute abnormality.    She arrived in the SICU at approximately 1 PM and remains in the SICU where she is now seen.    BP		=	103 – 138/65 - 77  P		=	64 – 78		O2 Sat	=	99% - 100%  R		=	12 – 17		IO	=	600 in/250 out  Temp		=	35.8 – 35.9    Glucose	=	-    Weight		=	61.6 Kg  BMI		=	25.0    She is awake, alert, and fully oriented. She was in no acute distress and appeared ill. She did not appear toxic. She reports being hungry and is in good spirits.  She is anicteric. She has reactive pupils and her extra-occular movements are intact. She has MRI markers on the back of her scalp.  She does not have thrush. Her oropharyngeal mucosa is normal.  She does not have JVD. There is no abnormal cervical adenopathy.  She has clear lungs bilaterally and she has non-labored respirations. She has symmetrical chest wall movements and she has healed mastectomy scars. She has a right sided Mediport (in her chest wall).  She has regular heart tones and she does not have a murmur.  Her abdomen is soft and neither distended nor tender. She does not have guarding or rebound. There are no palpable masses or abdominal wall hernias. She has active bowel sounds. There is no CVA tenderness.  Her extremities are well perfused. She does not have a rash.  She has decreased strength in her left lower extremity. She has a contracture of her left hand with decreased motion and sensation. Her musculoskeletal exam is otherwise normal but she remains in bed.     Remains on:    1)	NPO  2)	NS @ 100 ml/hour  3)	Protonix 40 mg po qAM  4)	Keppra 500 mg po q12 hours  5)	Gabapentin 300 mg pot id  6)	Colace 100 mg po tid    Assessment:	This patient is assessed as being a 57-year-old right-handed female who has had triple negative breast cancer first diagnosed in 2014. She was found to be BRCA negative and had bilateral mastectomies was subsequently adjuvant chemotherapy and radiation therapy. She has had lymph node, bone, lung and multiple brain metastases for which she has had multiple craniotomies and radiation treatments. She presented to the ED at [facility] at approximately 5 PM on 10/10/17 with complaints of having a headache that had been present for a few weeks and was accompanied by nausea and emesis. A CT scan of her brain revealed extensive vasogenic brain edema in the right parieto-occipital region. She had right to left midline shift with effacement of the right lateral ventricle. She was thought to have progression of dural disease (neoplasm) with worsening brain edema. Plans were reportedly formulated for her to undergo another craniotomy.    Plan to:    1)	Admit the patient to the SICU at the request of the Neurosurgeons  2)	Review imaging studies.  3)	Obtain a chest radiograph.  4)	Lower the rate of parenteral fluid.  5)	Treat her with decadron to decrease vasogenic edema.  6)	No need for Colace and gabapentin now.  7)	As she is NPO (except medications) she will not get Protonix if order to be given with  	breakfast. It is not clear that she needs Protonix.  8)	Change the Keppra to a parenteral form.  9)	As she previously had an abnormal Urine Analysis, will re-check.  10)	Doubt that she requires a pregnancy test but will obtain in order to avoid potential  	operative delays.  11)	Provide mechanical and chemical VTE prophylaxis  12)	Determine her resuscitation status. She has previously designated her spouse to be  	her healthcare agent.  13)	Full support in place    Terry Gerard  2 hours exclusive of procedures

## 2017-10-11 NOTE — CONSULT NOTE ADULT - ATTENDING COMMENTS
See adjacent SICU Attending Initial Assessment
Pt seen with fellow.  Agree with above.  Surgery today.  Spoke with pt's outside onc- he recommends hospice

## 2017-10-11 NOTE — CONSULT NOTE ADULT - PROBLEM SELECTOR RECOMMENDATION 5
Goal of Care - "To get better", believes there is the possibility of cure with her current treatment regimen.  Code Status - Full code  HCP - verbally identified her Daughter, Laura Simmons [phone]. Said paperwork for this was done previously  MOLST - Not done

## 2017-10-11 NOTE — PROGRESS NOTE ADULT - SUBJECTIVE AND OBJECTIVE BOX
No issues overnight  Vital Signs Last 24 Hrs  T(C): 35.9 (11 Oct 2017 01:03), Max: 37 (10 Oct 2017 22:25)  T(F): 96.7 (11 Oct 2017 01:03), Max: 98.6 (10 Oct 2017 22:25)  HR: 78 (11 Oct 2017 03:00) (64 - 86)  BP: 132/82 (11 Oct 2017 03:00) (118/68 - 138/82)  BP(mean): 93 (11 Oct 2017 03:00) (87 - 93)  RR: 17 (11 Oct 2017 03:00) (15 - 18)  SpO2: 100% (11 Oct 2017 03:00) (99% - 100%)    AAO X 3  PERRLA, EOMI  HSU Left side 2/5, right side 5/5    MEDICATIONS  (STANDING):  docusate sodium 100 milliGRAM(s) Oral three times a day  gabapentin 300 milliGRAM(s) Oral three times a day  influenza   Vaccine 0.5 milliLiter(s) IntraMuscular once  levETIRAcetam 500 milliGRAM(s) Oral two times a day  pantoprazole    Tablet 40 milliGRAM(s) Oral before breakfast  sodium chloride 0.9%. 1000 milliLiter(s) (100 mL/Hr) IV Continuous <Continuous>                          11.6   7.01  )-----------( 228      ( 11 Oct 2017 02:10 )             36.6   10-11    140  |  103  |  4<L>  ----------------------------<  144<H>  4.5   |  22  |  0.54    Ca    8.3<L>      11 Oct 2017 02:10  Phos  1.8     10-11  Mg     2.1     10-11    TPro  7.1  /  Alb  4.0  /  TBili  0.3  /  DBili  x   /  AST  21  /  ALT  13  /  AlkPhos  58  10-10    PT/INR - ( 11 Oct 2017 02:10 )   PT: 10.6 SEC;   INR: 0.95          PTT - ( 11 Oct 2017 02:10 )  PTT:27.8 SEC    Type and screen: O Pos

## 2017-10-12 ENCOUNTER — TRANSCRIPTION ENCOUNTER (OUTPATIENT)
Age: 57
End: 2017-10-12

## 2017-10-12 DIAGNOSIS — C50.919 MALIGNANT NEOPLASM OF UNSPECIFIED SITE OF UNSPECIFIED FEMALE BREAST: ICD-10-CM

## 2017-10-12 DIAGNOSIS — Z98.890 OTHER SPECIFIED POSTPROCEDURAL STATES: ICD-10-CM

## 2017-10-12 LAB
APTT BLD: 23.7 SEC — LOW (ref 27.5–37.4)
BASE EXCESS BLDA CALC-SCNC: -2.1 MMOL/L — SIGNIFICANT CHANGE UP
BUN SERPL-MCNC: 7 MG/DL — SIGNIFICANT CHANGE UP (ref 7–23)
CA-I BLDA-SCNC: 1.08 MMOL/L — LOW (ref 1.15–1.29)
CALCIUM SERPL-MCNC: 7.9 MG/DL — LOW (ref 8.4–10.5)
CHLORIDE SERPL-SCNC: 104 MMOL/L — SIGNIFICANT CHANGE UP (ref 98–107)
CO2 SERPL-SCNC: 22 MMOL/L — SIGNIFICANT CHANGE UP (ref 22–31)
CREAT SERPL-MCNC: 0.46 MG/DL — LOW (ref 0.5–1.3)
GLUCOSE BLDA-MCNC: 170 MG/DL — HIGH (ref 70–99)
GLUCOSE SERPL-MCNC: 168 MG/DL — HIGH (ref 70–99)
HCO3 BLDA-SCNC: 23 MMOL/L — SIGNIFICANT CHANGE UP (ref 22–26)
HCT VFR BLD CALC: 31.2 % — LOW (ref 34.5–45)
HCT VFR BLDA CALC: 29.8 % — LOW (ref 34.5–46.5)
HGB BLD-MCNC: 10.1 G/DL — LOW (ref 11.5–15.5)
HGB BLDA-MCNC: 9.6 G/DL — LOW (ref 11.5–15.5)
INR BLD: 1.03 — SIGNIFICANT CHANGE UP (ref 0.88–1.17)
LACTATE BLDA-SCNC: 2 MMOL/L — SIGNIFICANT CHANGE UP (ref 0.5–2)
MAGNESIUM SERPL-MCNC: 1.8 MG/DL — SIGNIFICANT CHANGE UP (ref 1.6–2.6)
MCHC RBC-ENTMCNC: 29.4 PG — SIGNIFICANT CHANGE UP (ref 27–34)
MCHC RBC-ENTMCNC: 32.4 % — SIGNIFICANT CHANGE UP (ref 32–36)
MCV RBC AUTO: 90.7 FL — SIGNIFICANT CHANGE UP (ref 80–100)
NRBC # FLD: 0 — SIGNIFICANT CHANGE UP
PCO2 BLDA: 31 MMHG — LOW (ref 32–48)
PH BLDA: 7.46 PH — HIGH (ref 7.35–7.45)
PHOSPHATE SERPL-MCNC: 1.8 MG/DL — LOW (ref 2.5–4.5)
PLATELET # BLD AUTO: 234 K/UL — SIGNIFICANT CHANGE UP (ref 150–400)
PMV BLD: 8.8 FL — SIGNIFICANT CHANGE UP (ref 7–13)
PO2 BLDA: 185 MMHG — HIGH (ref 83–108)
POTASSIUM BLDA-SCNC: 3 MMOL/L — LOW (ref 3.4–4.5)
POTASSIUM SERPL-MCNC: 3.3 MMOL/L — LOW (ref 3.5–5.3)
POTASSIUM SERPL-SCNC: 3.3 MMOL/L — LOW (ref 3.5–5.3)
PROTHROM AB SERPL-ACNC: 11.5 SEC — SIGNIFICANT CHANGE UP (ref 9.8–13.1)
RBC # BLD: 3.44 M/UL — LOW (ref 3.8–5.2)
RBC # FLD: 16.8 % — HIGH (ref 10.3–14.5)
SAO2 % BLDA: 99.3 % — HIGH (ref 95–99)
SODIUM BLDA-SCNC: 138 MMOL/L — SIGNIFICANT CHANGE UP (ref 136–146)
SODIUM SERPL-SCNC: 141 MMOL/L — SIGNIFICANT CHANGE UP (ref 135–145)
WBC # BLD: 12.48 K/UL — HIGH (ref 3.8–10.5)
WBC # FLD AUTO: 12.48 K/UL — HIGH (ref 3.8–10.5)

## 2017-10-12 PROCEDURE — 70553 MRI BRAIN STEM W/O & W/DYE: CPT | Mod: 26

## 2017-10-12 PROCEDURE — 99233 SBSQ HOSP IP/OBS HIGH 50: CPT | Mod: GC

## 2017-10-12 PROCEDURE — 70450 CT HEAD/BRAIN W/O DYE: CPT | Mod: 26,GC

## 2017-10-12 PROCEDURE — 99233 SBSQ HOSP IP/OBS HIGH 50: CPT

## 2017-10-12 RX ORDER — ENOXAPARIN SODIUM 100 MG/ML
40 INJECTION SUBCUTANEOUS EVERY 24 HOURS
Qty: 0 | Refills: 0 | Status: DISCONTINUED | OUTPATIENT
Start: 2017-10-12 | End: 2017-10-14

## 2017-10-12 RX ORDER — ACETAMINOPHEN 500 MG
1000 TABLET ORAL ONCE
Qty: 0 | Refills: 0 | Status: COMPLETED | OUTPATIENT
Start: 2017-10-12 | End: 2017-10-12

## 2017-10-12 RX ORDER — PANTOPRAZOLE SODIUM 20 MG/1
40 TABLET, DELAYED RELEASE ORAL DAILY
Qty: 0 | Refills: 0 | Status: DISCONTINUED | OUTPATIENT
Start: 2017-10-12 | End: 2017-10-14

## 2017-10-12 RX ORDER — POTASSIUM CHLORIDE 20 MEQ
40 PACKET (EA) ORAL ONCE
Qty: 0 | Refills: 0 | Status: COMPLETED | OUTPATIENT
Start: 2017-10-12 | End: 2017-10-12

## 2017-10-12 RX ORDER — CEFAZOLIN SODIUM 1 G
1000 VIAL (EA) INJECTION EVERY 8 HOURS
Qty: 0 | Refills: 0 | Status: COMPLETED | OUTPATIENT
Start: 2017-10-12 | End: 2017-10-12

## 2017-10-12 RX ORDER — POTASSIUM PHOSPHATE, MONOBASIC POTASSIUM PHOSPHATE, DIBASIC 236; 224 MG/ML; MG/ML
15 INJECTION, SOLUTION INTRAVENOUS ONCE
Qty: 0 | Refills: 0 | Status: COMPLETED | OUTPATIENT
Start: 2017-10-12 | End: 2017-10-12

## 2017-10-12 RX ORDER — ONDANSETRON 8 MG/1
4 TABLET, FILM COATED ORAL ONCE
Qty: 0 | Refills: 0 | Status: COMPLETED | OUTPATIENT
Start: 2017-10-12 | End: 2017-10-12

## 2017-10-12 RX ADMIN — Medication 400 MILLIGRAM(S): at 05:56

## 2017-10-12 RX ADMIN — Medication 1000 MILLIGRAM(S): at 11:56

## 2017-10-12 RX ADMIN — SODIUM CHLORIDE 75 MILLILITER(S): 9 INJECTION INTRAMUSCULAR; INTRAVENOUS; SUBCUTANEOUS at 09:33

## 2017-10-12 RX ADMIN — LEVETIRACETAM 400 MILLIGRAM(S): 250 TABLET, FILM COATED ORAL at 00:51

## 2017-10-12 RX ADMIN — LEVETIRACETAM 400 MILLIGRAM(S): 250 TABLET, FILM COATED ORAL at 14:11

## 2017-10-12 RX ADMIN — Medication 40 MILLIEQUIVALENT(S): at 06:05

## 2017-10-12 RX ADMIN — Medication 400 MILLIGRAM(S): at 00:25

## 2017-10-12 RX ADMIN — PANTOPRAZOLE SODIUM 40 MILLIGRAM(S): 20 TABLET, DELAYED RELEASE ORAL at 16:11

## 2017-10-12 RX ADMIN — Medication 1000 MILLIGRAM(S): at 00:50

## 2017-10-12 RX ADMIN — SODIUM CHLORIDE 75 MILLILITER(S): 9 INJECTION INTRAMUSCULAR; INTRAVENOUS; SUBCUTANEOUS at 00:30

## 2017-10-12 RX ADMIN — Medication 4 MILLIGRAM(S): at 11:41

## 2017-10-12 RX ADMIN — ONDANSETRON 4 MILLIGRAM(S): 8 TABLET, FILM COATED ORAL at 00:14

## 2017-10-12 RX ADMIN — Medication 4 MILLIGRAM(S): at 17:45

## 2017-10-12 RX ADMIN — POTASSIUM PHOSPHATE, MONOBASIC POTASSIUM PHOSPHATE, DIBASIC 62.5 MILLIMOLE(S): 236; 224 INJECTION, SOLUTION INTRAVENOUS at 06:04

## 2017-10-12 RX ADMIN — Medication 4 MILLIGRAM(S): at 05:55

## 2017-10-12 RX ADMIN — POTASSIUM PHOSPHATE, MONOBASIC POTASSIUM PHOSPHATE, DIBASIC 62.5 MILLIMOLE(S): 236; 224 INJECTION, SOLUTION INTRAVENOUS at 10:37

## 2017-10-12 RX ADMIN — Medication 100 MILLIGRAM(S): at 14:11

## 2017-10-12 RX ADMIN — Medication 400 MILLIGRAM(S): at 11:41

## 2017-10-12 RX ADMIN — Medication 100 MILLIGRAM(S): at 05:56

## 2017-10-12 NOTE — PROGRESS NOTE ADULT - SUBJECTIVE AND OBJECTIVE BOX
SICU Daily Progress Note  =====================================================  Interval/Overnight Events:     ***    HPI:   57F PMHx breast CA s/p b/l mastectomy () & chemoradiation, brain metastesis s/p multiple craniotomies and radiation, presented to [facility] ED on 10/9/17 complaining of 2 week history of headache, L-sided weakness and difficulty walking. Head CT scan performed in ED was concerning for progression of R parieto-occipital dural metastic disease, vasogenic edema w/ mass effect, effacement of R lateral ventricle & basal cisterns, evidence of subfalcine herniation as well as early transtentorial herniation. Patient was given Mannitol & steroids. She was admitted to Neurosurgery for planned OR today. A stereo tactic brain MRI was performed in preparation for the OR.     Allergies: No Known Allergies      MEDICATIONS:   --------------------------------------------------------------------------------------  Neurologic Medications  acetaminophen  IVPB. 1000 milliGRAM(s) IV Intermittent once  levETIRAcetam  IVPB 500 milliGRAM(s) IV Intermittent <User Schedule>    Respiratory Medications    Cardiovascular Medications    Gastrointestinal Medications  potassium chloride    Tablet ER 40 milliEquivalent(s) Oral once  potassium phosphate IVPB 15 milliMole(s) IV Intermittent once  potassium phosphate IVPB 15 milliMole(s) IV Intermittent once  sodium chloride 0.9%. 1000 milliLiter(s) IV Continuous <Continuous>    Genitourinary Medications    Hematologic/Oncologic Medications  influenza   Vaccine 0.5 milliLiter(s) IntraMuscular once    Antimicrobial/Immunologic Medications  ceFAZolin   IVPB 1000 milliGRAM(s) IV Intermittent every 8 hours    Endocrine/Metabolic Medications  dexamethasone  Injectable 4 milliGRAM(s) IV Push every 6 hours    Topical/Other Medications    --------------------------------------------------------------------------------------    VITAL SIGNS, INS/OUTS (last 24 hours):  --------------------------------------------------------------------------------------  T(C): 36.9 (10-12-17 @ 04:00), Max: 37 (10-12-17 @ 00:05)  HR: 85 (10-12-17 @ 05:00) (62 - 100)  BP: 129/99 (10-11-17 @ 14:00) (99/67 - 138/92)  BP(mean): 84 (10-11-17 @ 15:00) (71 - 105)  ABP: 114/60 (10-12-17 @ 05:00) (112/62 - 131/67)  ABP(mean): 82 (10-12-17 @ 05:00) (82 - 92)  RR: 13 (10-12-17 @ 05:00) (11 - 19)  SpO2: 100% (10-12-17 @ 05:00) (83% - 100%)  Wt(kg): --  CVP(mm Hg): --  CI: --  CAPILLARY BLOOD GLUCOSE       N/A      10-10 @ 07:01  -  10-11 @ 07:00  --------------------------------------------------------  IN:    sodium chloride 0.9%: 600 mL  Total IN: 600 mL    OUT:    Voided: 250 mL  Total OUT: 250 mL    Total NET: 350 mL      10-11 @ 07:01  -  10-12 @ 06:08  --------------------------------------------------------  IN:    sodium chloride 0.9%.: 900 mL  Total IN: 900 mL    OUT:    Indwelling Catheter - Urethral: 190 mL    Voided: 325 mL  Total OUT: 515 mL    Total NET: 385 mL        --------------------------------------------------------------------------------------    EXAM  NEUROLOGY  Exam: A&Ox3; speech fluid. 2/5 strength of LUE & LLE    RESPIRATORY  Exam: breathing comfortably on RA; b/l breath sounds    CARDIOVASCULAR  -no active issues  Exam: regular, no extra heart sounds  Cardiac Rhythm: regular, sinus on telemetry    GI/NUTRITION  Exam: soft, NT/ND  Diet: NPO    VASCULAR  Exam: Extremities warm, pink, well-perfused.     MUSCULOSKELETAL  Exam: All extremities moving spontaneously without limitations.     SKIN  Exam: Good skin turgor, no skin breakdown.      METABOLIC/FLUIDS/ELECTROLYTES  potassium chloride    Tablet ER 40 milliEquivalent(s) Oral once  potassium phosphate IVPB 15 milliMole(s) IV Intermittent once  potassium phosphate IVPB 15 milliMole(s) IV Intermittent once  sodium chloride 0.9%. 1000 milliLiter(s) IV Continuous <Continuous>      HEMATOLOGIC  [x] VTE Prophylaxis:   Transfusions:	[] PRBC	[] Platelets		[] FFP	[] Cryoprecipitate    INFECTIOUS DISEASE  Antimicrobials/Immunologic Medications:  ceFAZolin   IVPB 1000 milliGRAM(s) IV Intermittent every 8 hours  influenza   Vaccine 0.5 milliLiter(s) IntraMuscular once    Day #      of     ***    Tubes/Lines/Drains  ***  [x] Peripheral IV  [] Central Venous Line     	[] R	[] L	[] IJ	[] Fem	[] SC	Date Placed:   [] Arterial Line		[] R	[] L	[] Fem	[] Rad	[] Ax	Date Placed:   [] PICC		[] Midline		[] Mediport  [] Urinary Catheter		Date Placed:   [x] Necessity of urinary, arterial, and venous catheters discussed    LABS  --------------------------------------------------------------------------------------  CBC (10-12 @ 03:)                          10.1<L>                   12.48<H>  )--------------(  234        --    % Neuts, --    % Lymphs, ANC: --                              31.2<L>  CBC (10-11 @ 02:10)                          11.6                     7.01    )--------------(  228        --    % Neuts, --    % Lymphs, ANC: --                              36.6      BMP (10-12 @ 03:00)       141     |  104     |  7     			Ca++ --      Ca 7.9<L>       ---------------------------------( 168<H>		Mg 1.8          3.3<L>  |  22      |  0.46<L>			Ph 1.8<L>  BMP (10-11 @ 02:10)       140     |  103     |  4<L>  			Ca++ --      Ca 8.3<L>       ---------------------------------( 144<H>		Mg 2.1          4.5     |  22      |  0.54  			Ph 1.8<L>    LFTs (10-10 @ 18:12)      TPro 7.1 / Alb 4.0 / TBili 0.3 / DBili -- / AST 21 / ALT 13 / AlkPhos 58    Coags (10-12 @ 03:00)  aPTT 23.7<L> / INR 1.03 / PT 11.5  Coags (10-11 @ 02:10)  aPTT 27.8 / INR 0.95 / PT 10.6      ABG (10-12 @ 03:00)     7.46<H> / 31<L> / 185<H> / 23 / -2.1 / 99.3<H>%     Lactate: 2.0     VBG (10-10 @ 18:12)     7.38 / 50 / < 24<L> / 26 / 3.9 / 32.2<L>%      Lactate: 1.0    Urinalysis (10-11 @ 06:46):     Color: PLYEL / Appearance: CLEAR / S.017 / pH: 6.5 / Gluc: NEGATIVE / Ketones: TRACE / Bili: NEGATIVE / Urobili: NORMAL / Protein :NEGATIVE / Nitrites: NEGATIVE / Leuk.Est: NEGATIVE / RBC: 0-2 / WBC: 0-2 / Sq Epi: OCC / Non Sq Epi:  / Bacteria          --------------------------------------------------------------------------------------    OTHER LABORATORY:     IMAGING STUDIES:   CXR:     ASSESSMENT:  57F PMHx Breast CA w/ brain metastasis s/p multiple craniotomies p/w 2wk history of headache, L sided weakness and difficulty walking on 10/10/17, on CT imaging found to have progression of R dural metastatic disease w/ worsening vasogenic edema resulting in mass effect and signs of early herniation. Admitted to SICU for q1 neuro checks in anticipation of OR today.    Neuro: edema, mass effect & evidence of herniation 2/2 metastatic breast CA of brain   -con't keppra and decadron  -pre-op for OR today (T&S, coags, CBC, MRI)  -maintain Na>140-150 (per neurosurgery PA); will start NaCl 3% as necessary  -holding home gabapentin and levetiracetam    Resp: no acute issues  -encourage OOB & IS    CV: no acute issues  -monitor closely for HTN in setting of early herniation    GI: no acute issues  -NPO for OR  -holding home stool softener, colace and protonix    :   -strict I&O  -NS @75/h  -f/u repeat UA    Heme: DVTppx  -holding chemical ppx in setting of OR  -start lovenox when appropriate post-op    ID: no acute issues    Endo:   -holding home vit D3 and veknat 600 (calcium supplement)    Lines:   -PIV    Dispo:   -FULL code  -con't SICU care pending OR    --------------------------------------------------------------------------------------    Critical Care Diagnoses: SICU Daily Progress Note  =====================================================  Interval/Overnight Events: Surgery went well. Has brachial A line in place. MRI ordered for AM.    HPI:   57F PMHx breast CA s/p b/l mastectomy () & chemoradiation, brain metastesis s/p multiple craniotomies and radiation, presented to [facility] ED on 10/9/17 complaining of 2 week history of headache, L-sided weakness and difficulty walking. Head CT scan performed in ED was concerning for progression of R parieto-occipital dural metastic disease, vasogenic edema w/ mass effect, effacement of R lateral ventricle & basal cisterns, evidence of subfalcine herniation as well as early transtentorial herniation. Patient was given Mannitol & steroids. She was admitted to Neurosurgery for planned OR today. A stereo tactic brain MRI was performed in preparation for the OR.     Allergies: No Known Allergies      MEDICATIONS:   --------------------------------------------------------------------------------------  Neurologic Medications  acetaminophen  IVPB. 1000 milliGRAM(s) IV Intermittent once  levETIRAcetam  IVPB 500 milliGRAM(s) IV Intermittent <User Schedule>    Respiratory Medications    Cardiovascular Medications    Gastrointestinal Medications  potassium chloride    Tablet ER 40 milliEquivalent(s) Oral once  potassium phosphate IVPB 15 milliMole(s) IV Intermittent once  potassium phosphate IVPB 15 milliMole(s) IV Intermittent once  sodium chloride 0.9%. 1000 milliLiter(s) IV Continuous <Continuous>    Genitourinary Medications    Hematologic/Oncologic Medications  influenza   Vaccine 0.5 milliLiter(s) IntraMuscular once    Antimicrobial/Immunologic Medications  ceFAZolin   IVPB 1000 milliGRAM(s) IV Intermittent every 8 hours    Endocrine/Metabolic Medications  dexamethasone  Injectable 4 milliGRAM(s) IV Push every 6 hours    Topical/Other Medications    --------------------------------------------------------------------------------------    VITAL SIGNS, INS/OUTS (last 24 hours):  --------------------------------------------------------------------------------------  T(C): 36.9 (10-12-17 @ 04:00), Max: 37 (10-12-17 @ 00:05)  HR: 85 (10-12-17 @ 05:00) (62 - 100)  BP: 129/99 (10-11-17 @ 14:00) (99/67 - 138/92)  BP(mean): 84 (10-11-17 @ 15:00) (71 - 105)  ABP: 114/60 (10-12-17 @ 05:00) (112/62 - 131/67)  ABP(mean): 82 (10-12-17 @ 05:00) (82 - 92)  RR: 13 (10-12-17 @ 05:00) (11 - 19)  SpO2: 100% (10-12-17 @ 05:00) (83% - 100%)  Wt(kg): --  CVP(mm Hg): --  CI: --  CAPILLARY BLOOD GLUCOSE       N/A      10-10 @ 07:01  -  10-11 @ 07:00  --------------------------------------------------------  IN:    sodium chloride 0.9%: 600 mL  Total IN: 600 mL    OUT:    Voided: 250 mL  Total OUT: 250 mL    Total NET: 350 mL      10-11 @ 07:01  -  10-12 @ 06:08  --------------------------------------------------------  IN:    sodium chloride 0.9%.: 900 mL  Total IN: 900 mL    OUT:    Indwelling Catheter - Urethral: 190 mL    Voided: 325 mL  Total OUT: 515 mL    Total NET: 385 mL        --------------------------------------------------------------------------------------    EXAM  NEUROLOGY  Exam: A&Ox3; speech fluid. 2/5 strength of LUE & LLE    RESPIRATORY  Exam: breathing comfortably on RA; b/l breath sounds    CARDIOVASCULAR  -no active issues  Exam: regular, no extra heart sounds  Cardiac Rhythm: regular, sinus on telemetry    GI/NUTRITION  Exam: soft, NT/ND  Diet: NPO    VASCULAR  Exam: Extremities warm, pink, well-perfused.     MUSCULOSKELETAL  Exam: All extremities moving spontaneously without limitations.     SKIN  Exam: Good skin turgor, no skin breakdown.      METABOLIC/FLUIDS/ELECTROLYTES  potassium chloride    Tablet ER 40 milliEquivalent(s) Oral once  potassium phosphate IVPB 15 milliMole(s) IV Intermittent once  potassium phosphate IVPB 15 milliMole(s) IV Intermittent once  sodium chloride 0.9%. 1000 milliLiter(s) IV Continuous <Continuous>      HEMATOLOGIC  [x] VTE Prophylaxis:   Transfusions:	[] PRBC	[] Platelets		[] FFP	[] Cryoprecipitate    INFECTIOUS DISEASE  Antimicrobials/Immunologic Medications:  ceFAZolin   IVPB 1000 milliGRAM(s) IV Intermittent every 8 hours  influenza   Vaccine 0.5 milliLiter(s) IntraMuscular once    Day #      of     ***    Tubes/Lines/Drains  ***  [x] Peripheral IV  [] Central Venous Line     	[] R	[] L	[] IJ	[] Fem	[] SC	Date Placed:   [] Arterial Line		[] R	[] L	[] Fem	[] Rad	[] Ax	Date Placed:   [] PICC		[] Midline		[] Mediport  [] Urinary Catheter		Date Placed:   [x] Necessity of urinary, arterial, and venous catheters discussed    LABS  --------------------------------------------------------------------------------------  CBC (10-12 @ 03:00)                          10.1<L>                   12.48<H>  )--------------(  234        --    % Neuts, --    % Lymphs, ANC: --                              31.2<L>  CBC (10-11 @ 02:10)                          11.6                     7.01    )--------------(  228        --    % Neuts, --    % Lymphs, ANC: --                              36.6      BMP (10-12 @ 03:00)       141     |  104     |  7     			Ca++ --      Ca 7.9<L>       ---------------------------------( 168<H>		Mg 1.8          3.3<L>  |  22      |  0.46<L>			Ph 1.8<L>  BMP (10-11 @ 02:10)       140     |  103     |  4<L>  			Ca++ --      Ca 8.3<L>       ---------------------------------( 144<H>		Mg 2.1          4.5     |  22      |  0.54  			Ph 1.8<L>    LFTs (10-10 @ 18:12)      TPro 7.1 / Alb 4.0 / TBili 0.3 / DBili -- / AST 21 / ALT 13 / AlkPhos 58    Coags (10-12 @ 03:00)  aPTT 23.7<L> / INR 1.03 / PT 11.5  Coags (10-11 @ 02:10)  aPTT 27.8 / INR 0.95 / PT 10.6      ABG (10-12 @ 03:00)     7.46<H> / 31<L> / 185<H> / 23 / -2.1 / 99.3<H>%     Lactate: 2.0     VBG (10-10 @ 18:12)     7.38 / 50 / < 24<L> / 26 / 3.9 / 32.2<L>%      Lactate: 1.0    Urinalysis (10-11 @ 06:46):     Color: PLYEL / Appearance: CLEAR / S.017 / pH: 6.5 / Gluc: NEGATIVE / Ketones: TRACE / Bili: NEGATIVE / Urobili: NORMAL / Protein :NEGATIVE / Nitrites: NEGATIVE / Leuk.Est: NEGATIVE / RBC: 0-2 / WBC: 0-2 / Sq Epi: OCC / Non Sq Epi:  / Bacteria          --------------------------------------------------------------------------------------    OTHER LABORATORY:     IMAGING STUDIES:   CXR:     ASSESSMENT:  57F PMHx Breast CA w/ brain metastasis s/p multiple craniotomies p/w 2wk history of headache, L sided weakness and difficulty walking on 10/10/17, on CT imaging found to have progression of R dural metastatic disease w/ worsening vasogenic edema resulting in mass effect and signs of early herniation. Admitted to SICU for q1 neuro checks in anticipation of OR today.    Neuro: edema, mass effect & evidence of herniation 2/2 metastatic breast CA of brain   -con't keppra and decadron  -pre-op for OR today (T&S, coags, CBC, MRI)  -maintain Na>140-150 (per neurosurgery PA); will start NaCl 3% as necessary  -holding home gabapentin and levetiracetam    Resp: no acute issues  -encourage OOB & IS    CV: no acute issues  -monitor closely for HTN in setting of early herniation    GI: no acute issues  -NPO for OR  -holding home stool softener, colace and protonix    :   -strict I&O  -NS @75/h  -f/u repeat UA    Heme: DVTppx  -holding chemical ppx in setting of OR  -start lovenox when appropriate post-op    ID: no acute issues    Endo:   -holding home vit D3 and venkat 600 (calcium supplement)    Lines:   -PIV    Dispo:   -FULL code  -con't SICU care pending OR    --------------------------------------------------------------------------------------    Critical Care Diagnoses: SICU Daily Progress Note  =====================================================  Interval/Overnight Events: Surgery went well. Has brachial A line in place. MRI ordered for AM.    HPI:   57F PMHx breast CA s/p b/l mastectomy () & chemoradiation, brain metastesis s/p multiple craniotomies and radiation, presented to [facility] ED on 10/9/17 complaining of 2 week history of headache, L-sided weakness and difficulty walking. Head CT scan performed in ED was concerning for progression of R parieto-occipital dural metastic disease, vasogenic edema w/ mass effect, effacement of R lateral ventricle & basal cisterns, evidence of subfalcine herniation as well as early transtentorial herniation. Patient was given Mannitol & steroids. She was admitted to Neurosurgery for planned OR today. A stereo tactic brain MRI was performed in preparation for the OR.     Allergies: No Known Allergies      MEDICATIONS:   --------------------------------------------------------------------------------------  Neurologic Medications  acetaminophen  IVPB. 1000 milliGRAM(s) IV Intermittent once  levETIRAcetam  IVPB 500 milliGRAM(s) IV Intermittent <User Schedule>    Respiratory Medications    Cardiovascular Medications    Gastrointestinal Medications  potassium chloride    Tablet ER 40 milliEquivalent(s) Oral once  potassium phosphate IVPB 15 milliMole(s) IV Intermittent once  potassium phosphate IVPB 15 milliMole(s) IV Intermittent once  sodium chloride 0.9%. 1000 milliLiter(s) IV Continuous <Continuous>    Genitourinary Medications    Hematologic/Oncologic Medications  influenza   Vaccine 0.5 milliLiter(s) IntraMuscular once    Antimicrobial/Immunologic Medications  ceFAZolin   IVPB 1000 milliGRAM(s) IV Intermittent every 8 hours    Endocrine/Metabolic Medications  dexamethasone  Injectable 4 milliGRAM(s) IV Push every 6 hours    Topical/Other Medications    --------------------------------------------------------------------------------------    VITAL SIGNS, INS/OUTS (last 24 hours):  --------------------------------------------------------------------------------------  T(C): 36.9 (10-12-17 @ 04:00), Max: 37 (10-12-17 @ 00:05)  HR: 85 (10-12-17 @ 05:00) (62 - 100)  BP: 129/99 (10-11-17 @ 14:00) (99/67 - 138/92)  BP(mean): 84 (10-11-17 @ 15:00) (71 - 105)  ABP: 114/60 (10-12-17 @ 05:00) (112/62 - 131/67)  ABP(mean): 82 (10-12-17 @ 05:00) (82 - 92)  RR: 13 (10-12-17 @ 05:00) (11 - 19)  SpO2: 100% (10-12-17 @ 05:00) (83% - 100%)  Wt(kg): --  CVP(mm Hg): --  CI: --  CAPILLARY BLOOD GLUCOSE       N/A      10-10 @ 07:01  -  10-11 @ 07:00  --------------------------------------------------------  IN:    sodium chloride 0.9%: 600 mL  Total IN: 600 mL    OUT:    Voided: 250 mL  Total OUT: 250 mL    Total NET: 350 mL      10-11 @ 07:01  -  10-12 @ 06:08  --------------------------------------------------------  IN:    sodium chloride 0.9%.: 900 mL  Total IN: 900 mL    OUT:    Indwelling Catheter - Urethral: 190 mL    Voided: 325 mL  Total OUT: 515 mL    Total NET: 385 mL        --------------------------------------------------------------------------------------    EXAM  NEUROLOGY  Exam: A&Ox3; speech fluid. 2/5 strength of LUE & LLE; drain in place    RESPIRATORY  Exam: breathing comfortably on RA; b/l breath sounds    CARDIOVASCULAR  -no active issues  Exam: regular, no extra heart sounds  Cardiac Rhythm: regular, sinus on telemetry    GI/NUTRITION  Exam: soft, NT/ND  Diet: NPO    VASCULAR  Exam: Extremities warm, pink, well-perfused.     MUSCULOSKELETAL  Exam: All extremities moving spontaneously     SKIN  Exam: Good skin turgor, no skin breakdown.      METABOLIC/FLUIDS/ELECTROLYTES  potassium chloride    Tablet ER 40 milliEquivalent(s) Oral once  potassium phosphate IVPB 15 milliMole(s) IV Intermittent once  potassium phosphate IVPB 15 milliMole(s) IV Intermittent once  sodium chloride 0.9%. 1000 milliLiter(s) IV Continuous <Continuous>      HEMATOLOGIC  [x] VTE Prophylaxis:   Transfusions:	[] PRBC	[] Platelets		[] FFP	[] Cryoprecipitate    INFECTIOUS DISEASE  Antimicrobials/Immunologic Medications:  ceFAZolin   IVPB 1000 milliGRAM(s) IV Intermittent every 8 hours  influenza   Vaccine 0.5 milliLiter(s) IntraMuscular once    Day #      of     ***    Tubes/Lines/Drains  ***  [x] Peripheral IV  [] Central Venous Line     	[] R	[] L	[] IJ	[] Fem	[] SC	Date Placed:   [] Arterial Line		[] R	[] L	[] Fem	[] Rad	[] Ax	Date Placed:   [] PICC		[] Midline		[] Mediport  [] Urinary Catheter		Date Placed:   [x] Necessity of urinary, arterial, and venous catheters discussed    LABS  --------------------------------------------------------------------------------------  CBC (10-12 @ 03:00)                          10.1<L>                   12.48<H>  )--------------(  234        --    % Neuts, --    % Lymphs, ANC: --                              31.2<L>  CBC (10-11 @ 02:10)                          11.6                     7.01    )--------------(  228        --    % Neuts, --    % Lymphs, ANC: --                              36.6      BMP (10-12 @ 03:00)       141     |  104     |  7     			Ca++ --      Ca 7.9<L>       ---------------------------------( 168<H>		Mg 1.8          3.3<L>  |  22      |  0.46<L>			Ph 1.8<L>  BMP (10-11 @ 02:10)       140     |  103     |  4<L>  			Ca++ --      Ca 8.3<L>       ---------------------------------( 144<H>		Mg 2.1          4.5     |  22      |  0.54  			Ph 1.8<L>    LFTs (10-10 @ 18:12)      TPro 7.1 / Alb 4.0 / TBili 0.3 / DBili -- / AST 21 / ALT 13 / AlkPhos 58    Coags (10-12 @ 03:00)  aPTT 23.7<L> / INR 1.03 / PT 11.5  Coags (10-11 @ 02:10)  aPTT 27.8 / INR 0.95 / PT 10.6      ABG (10-12 @ 03:00)     7.46<H> / 31<L> / 185<H> / 23 / -2.1 / 99.3<H>%     Lactate: 2.0     VBG (10-10 @ 18:12)     7.38 / 50 / < 24<L> / 26 / 3.9 / 32.2<L>%      Lactate: 1.0    Urinalysis (10-11 @ 06:46):     Color: PLYEL / Appearance: CLEAR / S.017 / pH: 6.5 / Gluc: NEGATIVE / Ketones: TRACE / Bili: NEGATIVE / Urobili: NORMAL / Protein :NEGATIVE / Nitrites: NEGATIVE / Leuk.Est: NEGATIVE / RBC: 0-2 / WBC: 0-2 / Sq Epi: OCC / Non Sq Epi:  / Bacteria          --------------------------------------------------------------------------------------    OTHER LABORATORY:     IMAGING STUDIES:   < from: CT Head No Cont in OR (10.12.17 @ 08:04) >  EXAM:  CT BRAIN IN OR        PROCEDURE DATE:  Oct 12 2017         INTERPRETATION:  History: Status post surgery.    Multiple axial sections were performed from base of skull to vertex   without contrast enhancement.    This exam is compared with prior noncontrast head CT performed on 2017.    Please note angulation is very different on both studies.    Postop changes compatible with a right parietal craniotomy is again seen.    Area of extensive edema is again identified in the right parietal region   with involvement of the right posterior temporal and right glenohumeral   corpus callosal region now seen. This finding could be compatible with   vasogenic edema though please note a portion of cytotoxic edema possibly   secondary to underlying infarct cannot because excluded. MRI can be done   to better evaluate this finding can no contraindications. Increased mass   effect is seen on the right lateral ventricle with increased   right-to-left shift (6.6 mm). Scattered areas seen in the postop bed as   well as extra-axial air in the postop region. There is scattered areas of   high attenuation identified in the postop region which could be   compatible areas of hemorrhage and or postop material.    Evaluation the osseous structures with appropriate window setting postop   changes appear unremarkable    Oral tracheal tube is identified.    The visualized paranasal sinuses mastoid and middle ear regions appear   clear.    Impression: Postop changes as described above.                  KIRILL JACINTO M.D., ATTENDING RADIOLOGIST    < end of copied text >      ASSESSMENT:  57F PMHx Breast CA w/ brain metastasis s/p multiple craniotomies p/w 2wk history of headache, L sided weakness and difficulty walking on 10/10/17, on CT imaging found to have progression of R dural metastatic disease w/ worsening vasogenic edema resulting in mass effect and signs of early herniation. Admitted to SICU for q1 neuro checks in anticipation of OR today.    Neuro: edema, mass effect & evidence of herniation 2/2 metastatic breast CA of brain   -continue keppra and decadron  -continue q1hr neuro checks  -maintain Na>140-150 (per neurosurgery PA); will start NaCl 3% as necessary  -holding home gabapentin and levetiracetam  -HOB elevated at 45deg    Resp: no acute issues  -encourage IS    CV: no acute issues  -permissive HTN post-op    GI: no acute issues  -NPO  - hold home stool softener, colace and protonix    :   -strict I&O w/ campos  -NS @75/h    Heme:   -restart lovenox tomorrow morning for DVT ppx    ID:   -no acute issues    Endo:   -holding home vit D3 and venkat 600 (calcium supplement)    Lines:   -PIV  -brachial A line to be removed  -andres    Dispo:   -FULL code  -con't SICU care pending OR    --------------------------------------------------------------------------------------    Critical Care Diagnoses: metastatic breast cancer, cerebral edema, delirium, respiratory abnormality

## 2017-10-12 NOTE — CONSULT NOTE ADULT - ASSESSMENT
Ms. Cindy Deluca is a 57 year old lady who presents to the hospital with intractable headaches associated with nausea, vomiting and dizziness. She is known to have metastatic breast cancer since 2009, most recently with brain mets, has had gamma knife radiation as well as multiple craniotomies. Her chemotherapy has not been continued since March due to the multiple surgical procedures.     Palliative consult called to help with goals of care.
57 year old woman with metastatic breast cancer with brain mets s/p multiple craniotomies and resections for symptomatic metastases. No chemotherapy since March 2017. Patient admitted with headaches and is s/p craniotomy and resection.
57F PMHx Breast CA w/ brain metastesis s/p multiple craniotomies p/w 2wk history of headache, L sided weakness and difficulty walking on 10/10/17, on CT imaging found to have progression of R dural metastatic disease w/ worsening vasogenic edema resulting in mass effect and signs of early herniation. Admitted to SICU for q1 neuro checks in anticipation of OR today.    Neuro: edema, mass effect & evidence of herniation 2/2 metastatic breast CA of brain   -con't keppra  -con't decadron  -pre-op for OR today (T&S, coags, CBC, MRI)  -maintain Na>140-150 (per neurosurgery PA); will start NaCl 3% as necessary  -holding home gabapentin  -holding home levetiracetam    Resp: no acute issues  -encourage OOB & IS    CV: no acute issues  -monitor closely for HTN in setting of early herniation    GI: no acute issues  -NPO for OR  -holding home stool softener, colace  -holding home protonix    :   -strict I&O  -NS @75/h  -f/u repeat UA    Heme: DVTppx  -holding chemical ppx in setting of OR  -start lovenox when appropriate post-op    ID: no acute issues    Endo:   -holding home vit D3  -holding home venkat 600 (calcium supplement)    Lines:   -PIV    Dispo:   -FULL code  -con't SICU care pending OR      (Please contact [facility] SICU resident [id number], or PA [phone] with questions/concerns.)

## 2017-10-12 NOTE — BRIEF OPERATIVE NOTE - PROCEDURE
<<-----Click on this checkbox to enter Procedure Craniotomy and excision of neoplasm of brain  10/12/2017    Active  DBONDA

## 2017-10-12 NOTE — PROGRESS NOTE ADULT - SUBJECTIVE AND OBJECTIVE BOX
INTERVAL HPI/OVERNIGHT EVENTS: Underwent craninectomy successfully, comfortable at this time.   Reports that she doesn't want chemo anymore, wants to focus on her quality of life. She feels that hospice is for end of life and she doesn't feel she is at that point yet.       Allergies    No Known Allergies    Intolerances        ADVANCE DIRECTIVES:    DNR: [ ] YES [ x] NO           PRESENT SYMPTOMS:   SOURCE:  [ x] Patient   [ ] Family   [ ] Team     Pain: No    Dyspnea:  [ ] YES [ x] NO  Anxiety:  [ ] YES [ x] NO  Fatigue: [ ] YES [x ] NO  Nausea: [ ] YES [x] NO  Loss of Appetite: [ ] YES [x ] NO  Constipation [ ] YES   [x ] No     OTHER SYMPTOMS:  [x ] All other ROS negative     [ ] Unable to obtain due to poor mentation    MEDICATIONS  (STANDING):  ceFAZolin   IVPB 1000 milliGRAM(s) IV Intermittent every 8 hours  dexamethasone  Injectable 4 milliGRAM(s) IV Push every 6 hours  influenza   Vaccine 0.5 milliLiter(s) IntraMuscular once  levETIRAcetam  IVPB 500 milliGRAM(s) IV Intermittent <User Schedule>  sodium chloride 0.9%. 1000 milliLiter(s) (75 mL/Hr) IV Continuous <Continuous>    MEDICATIONS  (PRN):      Karnofsky Performance Score/Palliative Performance Status Version 2:      40   %  Protein Calorie Malnutrition:  [ ] Mild   [ ] Moderate   [ ] Severe     Physical Exam:    General: [x ] Alert,  A&O x 3    [ ] lethargic   [ ] Agitated   [ ] Cachexia   HEENT: [ x] Normal   [ ] Dry mouth   [ ] ET Tube    [ ] Trach   Lungs: [ ]x Clear [ ] Rhonchi  [ ] Crackles [ ] Wheezing [ ] Tachypnea  [ ] Audible excessive secretions   Cardiovascular:  [x ] Regular rate and rhythm  [ ] Irregular [ ] Tachycardia   [ ] Bradycardia   Abdomen: [x ] Soft  [ ] Distended  [ ]  [x ] +BS  [ x] Non tender [ ] Tender  [ ]PEG   [ ] NGT   Last BM:     Genitourinary:  [ x] Normal [ ] Incontinent   [ ] Oliguria/Anuria   [ ] Cortes  Musculoskeletal:  [ ] Normal   [x ] Generalized weakness  [ ] Bedbound   Neurological: [ ] No focal deficits  [ ] Cognitive impairment   left upper extremity has 4/5 strength, improved from yesterday. EVD drain in situ  Skin: [ ] Normal   [ ] Pressure ulcers     Vital Signs Last 24 Hrs  T(C): 37 (12 Oct 2017 08:00), Max: 37 (12 Oct 2017 00:05)  T(F): 98.6 (12 Oct 2017 08:00), Max: 98.6 (12 Oct 2017 00:05)  HR: 66 (12 Oct 2017 11:00) (62 - 100)  BP: 129/99 (11 Oct 2017 14:00) (129/70 - 129/99)  BP(mean): 84 (11 Oct 2017 15:00) (84 - 105)  RR: 14 (12 Oct 2017 11:00) (11 - 19)  SpO2: 100% (12 Oct 2017 11:00) (83% - 100%)    LABS:                        10.1   12.48 )-----------( 234      ( 12 Oct 2017 03:00 )             31.2     10-12    141  |  104  |  7   ----------------------------<  168<H>  3.3<L>   |  22  |  0.46<L>    Ca    7.9<L>      12 Oct 2017 03:00  Phos  1.8     10-12  Mg     1.8     10-12    TPro  7.1  /  Alb  4.0  /  TBili  0.3  /  DBili  x   /  AST  21  /  ALT  13  /  AlkPhos  58  10-10    PT/INR - ( 12 Oct 2017 03:00 )   PT: 11.5 SEC;   INR: 1.03          PTT - ( 12 Oct 2017 03:00 )  PTT:23.7 SEC  Urinalysis Basic - ( 11 Oct 2017 06:46 )    Color: PLYEL / Appearance: CLEAR / S.017 / pH: 6.5  Gluc: NEGATIVE / Ketone: TRACE  / Bili: NEGATIVE / Urobili: NORMAL E.U.   Blood: NEGATIVE / Protein: NEGATIVE / Nitrite: NEGATIVE   Leuk Esterase: NEGATIVE / RBC: 0-2 / WBC 0-2   Sq Epi: OCC / Non Sq Epi: x / Bacteria: x      I&O's Summary    11 Oct 2017 07:01  -  12 Oct 2017 07:00  --------------------------------------------------------  IN: 1200 mL / OUT: 620 mL / NET: 580 mL    12 Oct 2017 07:01  -  12 Oct 2017 12:37  --------------------------------------------------------  IN: 150 mL / OUT: 225 mL / NET: -75 mL        RADIOLOGY & ADDITIONAL STUDIES:

## 2017-10-12 NOTE — PROGRESS NOTE ADULT - SUBJECTIVE AND OBJECTIVE BOX
ANESTHESIA POSTOP CHECK    57y Female POSTOP DAY 1 S/P     Vital Signs Last 24 Hrs  T(C): 37 (12 Oct 2017 08:00), Max: 37 (12 Oct 2017 00:05)  T(F): 98.6 (12 Oct 2017 08:00), Max: 98.6 (12 Oct 2017 00:05)  HR: 65 (12 Oct 2017 10:00) (62 - 100)  BP: 129/99 (11 Oct 2017 14:00) (129/70 - 138/92)  BP(mean): 84 (11 Oct 2017 15:00) (84 - 105)  RR: 15 (12 Oct 2017 10:00) (11 - 19)  SpO2: 100% (12 Oct 2017 10:00) (83% - 100%)  I&O's Summary    11 Oct 2017 07:01  -  12 Oct 2017 07:00  --------------------------------------------------------  IN: 1200 mL / OUT: 620 mL / NET: 580 mL    12 Oct 2017 07:01  -  12 Oct 2017 10:36  --------------------------------------------------------  IN: 150 mL / OUT: 225 mL / NET: -75 mL        [x ] NO APPARENT ANESTHESIA COMPLICATIONS      Comments:

## 2017-10-12 NOTE — PROGRESS NOTE ADULT - ASSESSMENT
This is a 58 yo F POD#1 s/p R crani for resection of recurrent occipital metastasis. Patient is doing very well postoperatively.    q1 neuro checks  MRI brain w/ and w/o contrast  PT/OT  dec 6q6 then begin slow taper tomorrow

## 2017-10-12 NOTE — CONSULT NOTE ADULT - PROBLEM SELECTOR RECOMMENDATION 9
Planned to undergo neurosurgical intervention today.   Oncology consult regarding treatment plan / options
Metastatic breast cancer   - Patient has not been treated since March 2017 and not been seen lately in the office due to multiple surgeries and hospitalization  - As per Dr. Byrd, patient's disease was not responding to therapy and hospice was suggested to the patient.   - Patient's clinical status precludes her from anymore therapy  - Recommend hospice placement once patient agrees  - No plans for further chemotherapy  - Since no chemotherapy is planned, CT scan of the C/A/P is not needed to assess disease state  - MR of the brain/CT scans of the head as per neurosurgery  - Palliative care follow up  - Please call with questions    Sharan Banuelos MD  [facility] - A Division of [facility]   [address] [place] [id number] (T) [phone] (F) [phone]

## 2017-10-12 NOTE — CONSULT NOTE ADULT - SUBJECTIVE AND OBJECTIVE BOX
HPI:  56 YO female with history of breast Ca, brain metastases, s/p multiple craniotomies for resection, s/p GKRT x 2 s/p last chemo in March with Dr. Derian Irizarry at [facility], presents to ED with two weeks of progressively worsening headaches, nausea, vomiting, and left sided weakness, unable to ambulate X 2 weeks (10 Oct 2017 19:52)      PERTINENT PMH REVIEWED:  [ x] YES [ ] NO           SOCIAL HISTORY:  Significant other/partner:  [x ] YES  [ ] NO ex- Scot Deluca           Children:  [x] YES  [ ] NO  daughter Laura Simmons                 Shinto/Spirituality: Presbyterian  Substance hx:  [ ] YES   [x ] NO           Tobacco hx:  [ ] YES  [ x] NO             Alcohol hx: [ ] YES  [ x] NO        Home Opioid hx:  [x ] YES  [] NO   Living Situation: [x ] Home  [ ] Long term care  [ ] Rehab    REFERRALS:   [ ] Chaplaincy  [ ] Hospice  [ ] Child Life  [ ] Social Work  [ ] Case management [ ] Holistic Therapy     FAMILY HISTORY:  No pertinent family history in first degree relatives    [ ] Family history non contributory     BASELINE ADLs (prior to admission): walks with cane or walker at home. Has 2 aides total 84 hrs per week  Independent [ ] moderately [ ] fully   Dependent   [x ] moderately [ ] fully    ADVANCE DIRECTIVES:  [ ] YES [x ] NO   DNR [ ] YES [x ] NO                      MOLST  [ ] YES [ x] NO    Living Will  [ ] YES [ x] NO    Health Care Proxy [ x] YES  [ ] NO      [ ] Surrogate  [x ] HCP  [ ] Guardian:          patient verbally identified her daughter Laura Simmons [phone]                                                       She reports that papers were signed in the past; unclear if this is from a prior visit or with her oncologist.    Allergies    No Known Allergies    Intolerances        MEDICATIONS  (STANDING):  acetaminophen  IVPB. 1000 milliGRAM(s) IV Intermittent once  dexamethasone  Injectable 4 milliGRAM(s) IV Push every 6 hours  influenza   Vaccine 0.5 milliLiter(s) IntraMuscular once  levETIRAcetam  IVPB 500 milliGRAM(s) IV Intermittent <User Schedule>  sodium chloride 0.9%. 1000 milliLiter(s) (75 mL/Hr) IV Continuous <Continuous>    MEDICATIONS  (PRN):      PRESENT SYMPTOMS:  Source: [ x] Patient   [ ] Family   [ ] Team     Pain: [ ] YES [ x] NO  OLDCARTS:     Dyspnea: [ ] YES [x ] NO   Anxiety: [ ] YES [ x] NO  Fatigue: [ ] YES [ x] NO   Nausea: [ ] YES [ x] NO  Loss of appetite: [ ] YES [x ] NO   Constipation: [ ] YES [x ] NO     Other Symptoms: intermittent nausea, vomiting, headache and dizziness that are intermittent and have improved since admission  [ ] All other review of systems negative   [ ] Unable to obtain due to poor mentation     Karnofsky Performance Score/Palliative Performance Status Version 2:    50     %  Protein Calorie Malutrition:  [ ] Mild   [ ] Moderate   [ ] Severe     Vital Signs Last 24 Hrs  T(C): 35.7 (11 Oct 2017 08:00), Max: 37 (10 Oct 2017 22:25)  T(F): 96.3 (11 Oct 2017 08:00), Max: 98.6 (10 Oct 2017 22:25)  HR: 67 (11 Oct 2017 15:00) (62 - 86)  BP: 129/99 (11 Oct 2017 14:00) (99/67 - 138/92)  BP(mean): 84 (11 Oct 2017 15:00) (71 - 105)  RR: 19 (11 Oct 2017 15:00) (12 - 19)  SpO2: 89% (11 Oct 2017 15:00) (83% - 100%)    Physical Exam:    General: [ x] Alert,  A&O x 3    [ ] lethargic   [ ] Agitated   [ ] Cachexia   HEENT: [ x] Normal   [ ] Dry mouth   [ ] ET Tube    [ ] Trach   Lungs: [x ] Clear [ ] Rhonchi  [ ] Crackles [ ] Wheezing [ ] Tachypnea  [ ] Audible excessive secretions   Cardiovascular:  [ x] Regular rate and rhythm  [ ] Irregular [ ] Tachycardia   [ ] Bradycardia   Abdomen: [x ] Soft  [ ] Distended  [ ]  [ x] +BS  [x ] Non tender [ ] Tender  [ ]PEG   [ ] NGT   Last BM:     Genitourinary: [x ] Normal [ ] Incontinent   [ ] Oliguria/Anuria   [ ] Cortes  Musculoskeletal:  [ ] Normal   [x ] Generalized weakness  [ ] Bedbound   Neurological: [ ] No focal deficits  [ ] Cognitive impairment   left eye left sided hemianopsia; left arm wasting and contracture, claw toes of the right foot   Skin: [ x] Normal   [ ] Pressure ulcers [x] bilateral mastectomy    LABS:                        11.6   7.01  )-----------( 228      ( 11 Oct 2017 02:10 )             36.6     1011    140  |  103  |  4<L>  ----------------------------<  144<H>  4.5   |  22  |  0.54    Ca    8.3<L>      11 Oct 2017 02:10  Phos  1.8     10-11  Mg     2.1     10-11    TPro  7.1  /  Alb  4.0  /  TBili  0.3  /  DBili  x   /  AST  21  /  ALT  13  /  AlkPhos  58  10-10    PT/INR - ( 11 Oct 2017 02:10 )   PT: 10.6 SEC;   INR: 0.95          PTT - ( 11 Oct 2017 02:10 )  PTT:27.8 SEC  Urinalysis Basic - ( 11 Oct 2017 06:46 )    Color: PLYEL / Appearance: CLEAR / S.017 / pH: 6.5  Gluc: NEGATIVE / Ketone: TRACE  / Bili: NEGATIVE / Urobili: NORMAL E.U.   Blood: NEGATIVE / Protein: NEGATIVE / Nitrite: NEGATIVE   Leuk Esterase: NEGATIVE / RBC: 0-2 / WBC 0-2   Sq Epi: OCC / Non Sq Epi: x / Bacteria: x      I&O's Summary    10 Oct 2017 07:  -  11 Oct 2017 07:00  --------------------------------------------------------  IN: 600 mL / OUT: 250 mL / NET: 350 mL    11 Oct 2017 07:01  -  11 Oct 2017 15:52  --------------------------------------------------------  IN: 675 mL / OUT: 325 mL / NET: 350 mL        RADIOLOGY & ADDITIONAL STUDIES:
Critical access hospital Hematology/Oncology - Danielito Gutierrez / Der[place] / Vin - [phone]  Primary Outpatient Hematologist/Oncologist: Dr. Derian Byrd [facility]    Chief Complaint:  Recurrence headaches    HPI:  57 year old woman with history of metastatic breast cancer with brain mets s/p multiple craniotomies for resection who presents to the ED with worsening headaches for a few weeks. Also progressive weakness, nausea, vomiting, and inability to ambulate properly.     Pt s/p craniotomy and resection    ROS:  General:  (-)Pain, (-)Decrease appetite (-)Fevers, (-)Chills, (+)Weight Loss, (+)Headaches  Eyes: (-)Blurry Vision, (-)Double Vision, (-)Vision Loss  ENT: (-)Sinus Congestion, (-)Decreased Hearing, (-)Nosebleeds, (-)Sore Throat  Cardiac: (-)Chest Pain, (-)Palpitations, (-)Shortness of breathe on exertion  Respiratory: (-)Cough, (-)hemoptysis, (-)Shortness of breathe  GI: (-)Nausea, (-)Vomiting, (-)Diarrhea, (-)Constipation, (-)Melena, (-)BRBPR  : (-)Hematuria, (-)Dysuria, Polyuria  MSK: (-)Back pain, (-)Joint pain, (-)Stiffness  Dermatology: (-)Rash, (-)Itching  Neurology:  (-)Numbness, (-)Tingling, (-)Difficulty Walking, (-)Tremors, (+)Weakness  Psych: (-)Anxiety, (-)Depression, (-)Memory Loss  Hematology:  (-)Easy Bruising, (-)Easy Bleeding, (-)Night Sweats.     PAST MEDICAL & SURGICAL HISTORY:  Brachial neuritis: left  Breast cancer: left, mets to bone, lung, lymph nodes dx in 2014  mets to brain dx 2017  H/O brain surgery: craniotomy 3/2017  H/O bilateral mastectomy  H/O bilateral mastectomy: 2014    Social History  Tobacco: Denies current use  Alcohol: Denies current use  Drugs:  Denies current use    FAMILY HISTORY:  No pertinent family history in first degree relatives    MEDICATIONS  (STANDING):  dexamethasone  Injectable 4 milliGRAM(s) IV Push every 6 hours  influenza   Vaccine 0.5 milliLiter(s) IntraMuscular once  levETIRAcetam  IVPB 500 milliGRAM(s) IV Intermittent <User Schedule>  pantoprazole    Tablet 40 milliGRAM(s) Oral daily  sodium chloride 0.9%. 1000 milliLiter(s) (75 mL/Hr) IV Continuous <Continuous>    Allergies  No Known Allergies    Vital Signs Last 24 Hrs  T(C): 36.6 (12 Oct 2017 16:00), Max: 37 (12 Oct 2017 00:05)  T(F): 97.9 (12 Oct 2017 16:00), Max: 98.6 (12 Oct 2017 00:05)  HR: 86 (12 Oct 2017 16:00) (65 - 100)  BP: 126/62 (12 Oct 2017 16:00) (126/62 - 152/72)  BP(mean): 78 (12 Oct 2017 16:00) (78 - 92)  RR: 18 (12 Oct 2017 16:00) (11 - 18)  SpO2: 100% (12 Oct 2017 16:00) (100% - 100%)    Physical Exam:  General: AAO x 3. NAD. Lying in bed comfortably. +Drain in craniotomy site  HEENT: clear oropharynx, anicteric sclera, pink conjunctivae, EOMi. PERRLA  Cardiac: S1, S2 present. No audible murmurs. RRR  Lungs: CTA B/L.   Abdomen: Soft, Non-Tender, Non-Distended. No palpable hepatosplenomegaly  Extremities: 2+ pulses. No edema  Skin: No Rashes. No petechiae    Labs:  CBC Full  -  ( 12 Oct 2017 03:00 )  WBC Count : 12.48 K/uL  Hemoglobin : 10.1 g/dL  Hematocrit : 31.2 %  Platelet Count - Automated : 234 K/uL  Mean Cell Volume : 90.7 fL  Mean Cell Hemoglobin : 29.4 pg  Mean Cell Hemoglobin Concentration : 32.4 %  Auto Neutrophil # : x  Auto Lymphocyte # : x  Auto Monocyte # : x  Auto Eosinophil # : x  Auto Basophil # : x  Auto Neutrophil % : x  Auto Lymphocyte % : x  Auto Monocyte % : x  Auto Eosinophil % : x  Auto Basophil % : x    10-12    141  |  104  |  7   ----------------------------<  168<H>  3.3<L>   |  22  |  0.46<L>    Ca    7.9<L>      12 Oct 2017 03:00  Phos  1.8     10-12  Mg     1.8     10-12    TPro  7.1  /  Alb  4.0  /  TBili  0.3  /  DBili  x   /  AST  21  /  ALT  13  /  AlkPhos  58  10-10  PT/INR - ( 12 Oct 2017 03:00 )   PT: 11.5 SEC;   INR: 1.03     PTT - ( 12 Oct 2017 03:00 )  PTT:23.7 SEC    Radiology:  MR of the brain 10/12/2017  IMPRESSION:  Evolving postoperative changes as described, status post resection of   right occipital parietal metastasis.    MR of the brain 10/11/201  IMPRESSION: Narrowing of the distal superior sagittal sinus which is,   nonetheless, patent.  No other evidence of venous sinus thrombosis.
HISTORY OF PRESENT ILLNESS:  57F PMHx breast CA s/p b/l mastectomy (2014) & chemoradiation, brain metastesis s/p multiple craniotomies and radiation, presented to [facility] ED on 10/9/17 complaining of 2 week history of headache, L-sided weakness and difficulty walking. Head CT scan performed in ED was concerning for progression of R parieto-occipital dural metastic disease, vasogenic edema w/ mass effect, effacement of R lateral ventricle & basal cisterns, evidence of subfalcine herniation as well as early transtentorial herniation. Patient was given Mannitol & steroids. She was admitted to Neurosurgery for planned OR today. A stereo tactic brain MRI was performed in preparation for the OR.     SICU was consulted for admission to ICU q1 neurologic checks.         PAST MEDICAL HISTORY: Brachial neuritis  Breast cancer  Breast cancer      PAST SURGICAL HISTORY: H/O brain surgery  H/O bilateral mastectomy  No significant past surgical history  H/O bilateral mastectomy      FAMILY HISTORY: No pertinent family history in first degree relatives      SOCIAL HISTORY: ; denies ever smoking; denies EtOH use.    CODE STATUS: FULL    HOME MEDICATIONS:   Patient Currently Takes Medications as of 29-Aug-2017 15:44 documented in Structured Notes  · 	dexamethasone 4 mg oral tablet: 1 tab(s) orally 2 times a day x 7 days  · 	Keppra 500 mg oral tablet: 1 tab(s) orally 2 times a day  · 	gabapentin 300 mg oral tablet: 300 milligram(s) orally 3 times a day, As Needed MDD:900mg  · 	oxyCODONE 5 mg oral tablet: 1 tab(s) orally every 4 hours, As needed, Moderate Pain (4 - 6) MDD:6  · 	dexamethasone 2 mg oral tablet: 2 tab(s) PO 6H x2 days, then 2 tabsPO Q8H x2 days, then 2 tabs POQ12H x2 days, then2 tabPO daily x2 days, then1 tab podaily x2 days then d/c MDD:16mg  · 	levETIRAcetam 500 mg oral tablet: 1 tab(s) orally 2 times a day MDD:2 tabs  · 	docusate sodium 100 mg oral capsule: 1 cap(s) orally 3 times a day  · 	pantoprazole 40 mg oral delayed release tablet: 1 tab(s) orally once a day (before a meal) MDD:1 tab  · 	venkat 600: 1 tab(s) orally once a day  · 	Vitamin D3 1000 intl units oral capsule: 1 cap(s) orally once a day  · 	Tylenol 500 mg oral tablet: 2 tab(s) orally every 6 hours, As Needed    ALLERGIES: No Known Allergies      VITAL SIGNS:  ICU Vital Signs Last 24 Hrs  T(C): 35.8 (11 Oct 2017 05:00), Max: 37 (10 Oct 2017 22:25)  T(F): 96.4 (11 Oct 2017 05:00), Max: 98.6 (10 Oct 2017 22:25)  HR: 74 (11 Oct 2017 07:00) (64 - 86)  BP: 111/95 (11 Oct 2017 07:00) (103/65 - 138/82)  BP(mean): 98 (11 Oct 2017 07:00) (74 - 98)  ABP: --  ABP(mean): --  RR: 15 (11 Oct 2017 07:00) (12 - 18)  SpO2: 100% (11 Oct 2017 07:00) (99% - 100%)      NEURO  Exam: A&Ox3; speech fluid. 2/5 strength of LUE & LLE  Meds:levETIRAcetam  IVPB 500 milliGRAM(s) IV Intermittent <User Schedule>      RESPIRATORY  Mechanical Ventilation: n/a    Exam: breathing comfortably on RA; b/l breath sounds  Meds: none    CARDIOVASCULAR  VBG - ( 10 Oct 2017 18:12 )  pH: 7.38  /  pCO2: 50    /  pO2: < 24  / HCO3: 26    / Base Excess: 3.9   /  SaO2: 32.2   Lactate: 1.0        Exam: regular, no extra heart sounds  Cardiac Rhythm: regular, sinus on telemetry  Meds: none    GI/NUTRITION  Exam: soft, NT/ND  Diet: NPO  Meds: none    GENITOURINARY/RENAL  Meds:sodium chloride 0.9%. 1000 milliLiter(s) IV Continuous <Continuous>      10-10 @ 07:01  -  10-11 @ 07:00  --------------------------------------------------------  IN:    sodium chloride 0.9%: 600 mL  Total IN: 600 mL    OUT:    Voided: 250 mL  Total OUT: 250 mL    Total NET: 350 mL        Weight (kg): 59.9 (10-11 @ 01:03)  10-11    140  |  103  |  4<L>  ----------------------------<  144<H>  4.5   |  22  |  0.54    Ca    8.3<L>      11 Oct 2017 02:10  Phos  1.8     10-11  Mg     2.1     10-11    TPro  7.1  /  Alb  4.0  /  TBili  0.3  /  DBili  x   /  AST  21  /  ALT  13  /  AlkPhos  58  10-10    [n/a ] Cortes catheter, indication: urine output monitoring in critically ill patient    HEMATOLOGIC  [x ] VTE Prophylaxis: chemical DVTppx on hold for OR                          11.6   7.01  )-----------( 228      ( 11 Oct 2017 02:10 )             36.6     PT/INR - ( 11 Oct 2017 02:10 )   PT: 10.6 SEC;   INR: 0.95          PTT - ( 11 Oct 2017 02:10 )  PTT:27.8 SEC  Transfusion: none past 24hr  [ ] PRBC	[ ] Platelets	[ ] FFP	[ ] Cryoprecipitate      INFECTIOUS DISEASES  Meds:influenza   Vaccine 0.5 milliLiter(s) IntraMuscular once    RECENT CULTURES: none      ENDOCRINE  Meds:dexamethasone  Injectable 4 milliGRAM(s) IV Push every 6 hours    CAPILLARY BLOOD GLUCOSE          PATIENT CARE ACCESS DEVICES:  [x ] Peripheral IV  [ ] Central Venous Line	[ ] R	[ ] L	[ ] IJ	[ ] Fem	[ ] SC	Placed:   [ ] Arterial Line		[ ] R	[ ] L	[ ] Fem	[ ] Rad	[ ] Ax	Placed:   [ ] PICC:					[ ] Mediport  [ ] Urinary Catheter, Date Placed:   [x] Necessity of urinary, arterial, and venous catheters discussed    OTHER MEDICATIONS: none    IMAGING STUDIES:    < from: CT Head w/wo IV Cont (10.10.17 @ 18:46) >  CT BRAIN WAW IC        PROCEDURE DATE:  Oct 10 2017         INTERPRETATION:  CLINICAL INFORMATION: 57-year-old female with breast   cancer and brain metastases presents with headache, nausea, and vomiting..    TECHNIQUE: Axial noncontrast CTimages of the head from the base of the   skull to the vertex. Coronal and sagittal reformats were obtained.    COMPARISON: CT head 8/28/2017. MRI brain 8/29/2017.    FINDINGS:     There is extensive vasogenic edema in the right parieto-occipital region   that is increased compared to 8/29/2017. Edema extends into the deep, and   periventricular white matter with extension to the body of the corpus   callosum on the right. There is a 1.4 cm leftward midline shift. The   right lateral ventricle iseffaced. The suprasellar and interpeduncular   cisterns are effaced. There is no acute intracranial hemorrhage.   Plaque-like enhancement within the dural margin of the right   parieto-occipital region appears increased in size when compared to the   prior. Previously noted punctate focus of enhancement within the right   putamen with better assessed on the prior MRI examination. The previous   tiny focus of enhancement within the right postcentral gyrus was also   better assessed on the prior MRI examination.    The orbits, paranasal sinuses and visualized mastoids are unremarkable.    Patient is status post right parietal craniotomy.    IMPRESSION: Findings compatible with progression of dural metastatic   disease within the right parieto-occipital dural margins.    There is worsening extensive vasogenic edema causing mass effect and   effacement of both the right lateral ventricle and basal cisterns   concerning for a subfalcine herniation and possibly early transtentorial   herniation.    < end of copied text >

## 2017-10-12 NOTE — PROGRESS NOTE ADULT - SUBJECTIVE AND OBJECTIVE BOX
Neurosurgery postop  Resting comfortably  Vital Signs Last 24 Hrs  T(C): 37 (12 Oct 2017 00:05), Max: 37 (12 Oct 2017 00:05)  T(F): 98.6 (12 Oct 2017 00:05), Max: 98.6 (12 Oct 2017 00:05)  HR: 99 (12 Oct 2017 03:00) (62 - 100)  BP: 129/99 (11 Oct 2017 14:00) (99/67 - 138/92)  BP(mean): 84 (11 Oct 2017 15:00) (71 - 105)  RR: 11 (12 Oct 2017 03:00) (11 - 19)  SpO2: 100% (12 Oct 2017 03:00) (83% - 100%)    AAO X 3  PERRLA, EOMI  HSU Left 3/5 with left hand spastic paresis  Right 5/5    MEDICATIONS  (STANDING):  acetaminophen  IVPB. 1000 milliGRAM(s) IV Intermittent once  acetaminophen  IVPB. 1000 milliGRAM(s) IV Intermittent once  ceFAZolin   IVPB 1000 milliGRAM(s) IV Intermittent every 8 hours  dexamethasone  Injectable 4 milliGRAM(s) IV Push every 6 hours  influenza   Vaccine 0.5 milliLiter(s) IntraMuscular once  levETIRAcetam  IVPB 500 milliGRAM(s) IV Intermittent <User Schedule>  sodium chloride 0.9%. 1000 milliLiter(s) (75 mL/Hr) IV Continuous <Continuous>                          11.6   7.01  )-----------( 228      ( 11 Oct 2017 02:10 )             36.6   10-11    140  |  103  |  4<L>  ----------------------------<  144<H>  4.5   |  22  |  0.54    Ca    8.3<L>      11 Oct 2017 02:10  Phos  1.8     10-11  Mg     2.1     10-11    TPro  7.1  /  Alb  4.0  /  TBili  0.3  /  DBili  x   /  AST  21  /  ALT  13  /  AlkPhos  58  10-10      Postop CT: Air, postoperative changes, decreased mass effect and shift

## 2017-10-12 NOTE — PROGRESS NOTE ADULT - PROBLEM SELECTOR PLAN 5
Goal of Care - "To get better", doesn't want chemo anymore and wants to focus on feeling better.  Code Status - Full code  HCP - verbally identified her Daughter, Laura Simmons [phone]. Said paperwork for this was done previously  MOLST - Not done.

## 2017-10-12 NOTE — PROGRESS NOTE ADULT - PROBLEM SELECTOR PLAN 1
Planned to undergo neurosurgical intervention today.   Oncology consult regarding treatment plan / options.

## 2017-10-12 NOTE — PROGRESS NOTE ADULT - SUBJECTIVE AND OBJECTIVE BOX
Visit Summary: Patient seen and evaluated at bedside. She is wide awake and aler this am in good spirits. The strength on her L side has returned to baseline, and her pain is well controlled.    Overnight Events: no acute events o/n    Exam:  T(C): 36.9 (10-12-17 @ 04:00), Max: 37 (10-12-17 @ 00:05)  HR: 92 (10-12-17 @ 06:00) (62 - 100)  BP: 129/99 (10-11-17 @ 14:00) (99/67 - 138/92)  RR: 16 (10-12-17 @ 06:00) (11 - 19)  SpO2: 100% (10-12-17 @ 06:00) (83% - 100%)  Wt(kg): --    AAOx3, EOS, FC  PERRL, R gaze preference  L homonymous hemianopsia  5/5 on R  4-/5 prox LUE, 3/5 distal LUE  4-/5 LLE                        10.1   12.48 )-----------( 234      ( 12 Oct 2017 03:00 )             31.2     10-12    141  |  104  |  7   ----------------------------<  168<H>  3.3<L>   |  22  |  0.46<L>    Ca    7.9<L>      12 Oct 2017 03:00  Phos  1.8     10-12  Mg     1.8     10-12    TPro  7.1  /  Alb  4.0  /  TBili  0.3  /  DBili  x   /  AST  21  /  ALT  13  /  AlkPhos  58  10-10  PT/INR - ( 12 Oct 2017 03:00 )   PT: 11.5 SEC;   INR: 1.03          PTT - ( 12 Oct 2017 03:00 )  PTT:23.7 SEC

## 2017-10-12 NOTE — PROGRESS NOTE ADULT - PROBLEM SELECTOR PLAN 6
Spoke with patient, she discussed what she understood about the plan from her oncologist, and she indicates that she wants to focus on feeling better at this time and not continue with chemotherapy. She does not want hospice involved at this time, even though her oncologist suggested it. She is willing to have discussion about what hospice entails prior to discharge.

## 2017-10-12 NOTE — PROGRESS NOTE ADULT - ASSESSMENT
Ms. Cindy Deluca is a 57 year old lady who presents to the hospital with intractable headaches associated with nausea, vomiting and dizziness. She is known to have metastatic breast cancer since 2009, most recently with brain mets, has had gamma knife radiation as well as multiple craniotomies. Her chemotherapy has not been continued since March due to the multiple surgical procedures.     Palliative consult called to help with goals of care.

## 2017-10-12 NOTE — PROGRESS NOTE ADULT - PROBLEM SELECTOR PLAN 2
No relief with oxycodone at home  Relieved with dexamethasone on admission; will continue current regimen  Planned to undergo resection of recurrent mets; has had multiple similar procedures before.   Improved today s/p procedure

## 2017-10-13 LAB
ALBUMIN SERPL ELPH-MCNC: 3.4 G/DL — SIGNIFICANT CHANGE UP (ref 3.3–5)
ALP SERPL-CCNC: 47 U/L — SIGNIFICANT CHANGE UP (ref 40–120)
ALT FLD-CCNC: 12 U/L — SIGNIFICANT CHANGE UP (ref 4–33)
AST SERPL-CCNC: 15 U/L — SIGNIFICANT CHANGE UP (ref 4–32)
BILIRUB SERPL-MCNC: < 0.2 MG/DL — LOW (ref 0.2–1.2)
BUN SERPL-MCNC: 7 MG/DL — SIGNIFICANT CHANGE UP (ref 7–23)
CALCIUM SERPL-MCNC: 8.4 MG/DL — SIGNIFICANT CHANGE UP (ref 8.4–10.5)
CHLORIDE SERPL-SCNC: 105 MMOL/L — SIGNIFICANT CHANGE UP (ref 98–107)
CO2 SERPL-SCNC: 23 MMOL/L — SIGNIFICANT CHANGE UP (ref 22–31)
CREAT SERPL-MCNC: 0.46 MG/DL — LOW (ref 0.5–1.3)
GLUCOSE SERPL-MCNC: 156 MG/DL — HIGH (ref 70–99)
HCT VFR BLD CALC: 27.9 % — LOW (ref 34.5–45)
HGB BLD-MCNC: 8.8 G/DL — LOW (ref 11.5–15.5)
MAGNESIUM SERPL-MCNC: 2.2 MG/DL — SIGNIFICANT CHANGE UP (ref 1.6–2.6)
MCHC RBC-ENTMCNC: 28.9 PG — SIGNIFICANT CHANGE UP (ref 27–34)
MCHC RBC-ENTMCNC: 31.5 % — LOW (ref 32–36)
MCV RBC AUTO: 91.5 FL — SIGNIFICANT CHANGE UP (ref 80–100)
NRBC # FLD: 0 — SIGNIFICANT CHANGE UP
PHOSPHATE SERPL-MCNC: 1.2 MG/DL — LOW (ref 2.5–4.5)
PLATELET # BLD AUTO: 218 K/UL — SIGNIFICANT CHANGE UP (ref 150–400)
PMV BLD: 9 FL — SIGNIFICANT CHANGE UP (ref 7–13)
POTASSIUM SERPL-MCNC: 4.1 MMOL/L — SIGNIFICANT CHANGE UP (ref 3.5–5.3)
POTASSIUM SERPL-SCNC: 4.1 MMOL/L — SIGNIFICANT CHANGE UP (ref 3.5–5.3)
PROT SERPL-MCNC: 6.2 G/DL — SIGNIFICANT CHANGE UP (ref 6–8.3)
RBC # BLD: 3.05 M/UL — LOW (ref 3.8–5.2)
RBC # FLD: 17.2 % — HIGH (ref 10.3–14.5)
SODIUM SERPL-SCNC: 140 MMOL/L — SIGNIFICANT CHANGE UP (ref 135–145)
WBC # BLD: 10 K/UL — SIGNIFICANT CHANGE UP (ref 3.8–10.5)
WBC # FLD AUTO: 10 K/UL — SIGNIFICANT CHANGE UP (ref 3.8–10.5)

## 2017-10-13 PROCEDURE — 74177 CT ABD & PELVIS W/CONTRAST: CPT | Mod: 26

## 2017-10-13 PROCEDURE — 99233 SBSQ HOSP IP/OBS HIGH 50: CPT

## 2017-10-13 PROCEDURE — 71260 CT THORAX DX C+: CPT | Mod: 26

## 2017-10-13 PROCEDURE — 99233 SBSQ HOSP IP/OBS HIGH 50: CPT | Mod: GC

## 2017-10-13 RX ORDER — DEXAMETHASONE 0.5 MG/5ML
4 ELIXIR ORAL EVERY 8 HOURS
Qty: 0 | Refills: 0 | Status: DISCONTINUED | OUTPATIENT
Start: 2017-10-13 | End: 2017-10-14

## 2017-10-13 RX ADMIN — Medication 63.75 MILLIMOLE(S): at 12:00

## 2017-10-13 RX ADMIN — PANTOPRAZOLE SODIUM 40 MILLIGRAM(S): 20 TABLET, DELAYED RELEASE ORAL at 11:59

## 2017-10-13 RX ADMIN — LEVETIRACETAM 400 MILLIGRAM(S): 250 TABLET, FILM COATED ORAL at 13:27

## 2017-10-13 RX ADMIN — ENOXAPARIN SODIUM 40 MILLIGRAM(S): 100 INJECTION SUBCUTANEOUS at 05:55

## 2017-10-13 RX ADMIN — Medication 4 MILLIGRAM(S): at 14:57

## 2017-10-13 RX ADMIN — LEVETIRACETAM 400 MILLIGRAM(S): 250 TABLET, FILM COATED ORAL at 00:02

## 2017-10-13 RX ADMIN — SODIUM CHLORIDE 10 MILLILITER(S): 9 INJECTION INTRAMUSCULAR; INTRAVENOUS; SUBCUTANEOUS at 11:59

## 2017-10-13 RX ADMIN — Medication 4 MILLIGRAM(S): at 05:55

## 2017-10-13 RX ADMIN — Medication 4 MILLIGRAM(S): at 00:00

## 2017-10-13 RX ADMIN — Medication 4 MILLIGRAM(S): at 21:40

## 2017-10-13 RX ADMIN — Medication 63.75 MILLIMOLE(S): at 05:56

## 2017-10-13 NOTE — PROGRESS NOTE ADULT - PROBLEM SELECTOR PLAN 5
Goal of Care - "To get better", doesn't want chemo anymore and wants to focus on feeling better.  Code Status - Full code  HCP - verbally identified her Daughter, Laura Simmons [phone]. Said paperwork for this was done previously  MOLST - Not done.

## 2017-10-13 NOTE — PROGRESS NOTE ADULT - SUBJECTIVE AND OBJECTIVE BOX
INTERVAL HPI/OVERNIGHT EVENTS: POD 2; doing well, drain out sitting out of bed in chair, anticipate transfer out of surgical ICU.   Bowel movement +; no headache, no nausea or vomiting, no dizziness.   Seen by PT today.   Reports that she doesn't think she can go to [facility] as she is very specific about her dietary habits, is vegan, prefers to use only organically sourced foods etc.; and has been happy with outpatient PT in the past.     Allergies    No Known Allergies    Intolerances        ADVANCE DIRECTIVES:    DNR: [ ] YES [x ] NO           PRESENT SYMPTOMS:   SOURCE:  [ x] Patient   [ ] Family   [ ] Team     Pain:     Dyspnea:  [ ] YES [x ] NO  Anxiety:  [ ] YES [ x] NO  Fatigue: [ ] YES [ x] NO  Nausea: [ ] YES [x ] NO  Loss of Appetite: [ ] YES [x ] NO  Constipation [ ] YES   [x ] No     OTHER SYMPTOMS:  [ x] All other ROS negative     [ ] Unable to obtain due to poor mentation    MEDICATIONS  (STANDING):  dexamethasone  Injectable 4 milliGRAM(s) IV Push every 8 hours  enoxaparin Injectable 40 milliGRAM(s) SubCutaneous every 24 hours  influenza   Vaccine 0.5 milliLiter(s) IntraMuscular once  levETIRAcetam  IVPB 500 milliGRAM(s) IV Intermittent <User Schedule>  pantoprazole    Tablet 40 milliGRAM(s) Oral daily  sodium chloride 0.9%. 1000 milliLiter(s) (10 mL/Hr) IV Continuous <Continuous>    MEDICATIONS  (PRN):      Karnofsky Performance Score/Palliative Performance Status Version 2:         %  Protein Calorie Malnutrition:  [ ] Mild   [ ] Moderate   [ ] Severe     Physical Exam:    General: [x ] Alert,  A&O x   3  [ ] lethargic   [ ] Agitated   [ ] Cachexia   HEENT: [x ] Normal   [ ] Dry mouth   [ ] ET Tube    [ ] Trach   Lungs: [ x] Clear [ ] Rhonchi  [ ] Crackles [ ] Wheezing [ ] Tachypnea  [ ] Audible excessive secretions   Cardiovascular:  [ x] Regular rate and rhythm  [ ] Irregular [ ] Tachycardia   [ ] Bradycardia   Abdomen: [x ] Soft  [ ] Distended  [ ]  [ x] +BS  [ x] Non tender [ ] Tender  [ ]PEG   [ ] NGT   Last BM:     Genitourinary:  [ x] Normal [ ] Incontinent   [ ] Oliguria/Anuria   [ ] Cortes  Musculoskeletal:  [ ] Normal   [x ] Generalized weakness  [ ] Bedbound   Neurological: [ ] No focal deficits  [ ] Cognitive impairment   [x ] left hand weakness  [ x] incision from surgery  Skin: [ ] Normal   [ ] Pressure ulcers     Vital Signs Last 24 Hrs  T(C): 36.4 (13 Oct 2017 12:00), Max: 36.6 (12 Oct 2017 16:00)  T(F): 97.6 (13 Oct 2017 12:00), Max: 97.9 (12 Oct 2017 16:00)  HR: 82 (13 Oct 2017 13:00) (65 - 107)  BP: 110/75 (13 Oct 2017 13:00) (110/75 - 163/95)  BP(mean): 84 (13 Oct 2017 13:00) (74 - 112)  RR: 16 (13 Oct 2017 13:00) (14 - 20)  SpO2: 99% (13 Oct 2017 13:00) (98% - 100%)    LABS:                        8.8    10.00 )-----------( 218      ( 13 Oct 2017 02:35 )             27.9     10-13    140  |  105  |  7   ----------------------------<  156<H>  4.1   |  23  |  0.46<L>    Ca    8.4      13 Oct 2017 02:35  Phos  1.2     10-13  Mg     2.2     10-13    TPro  6.2  /  Alb  3.4  /  TBili  < 0.2<L>  /  DBili  x   /  AST  15  /  ALT  12  /  AlkPhos  47  10-13    PT/INR - ( 12 Oct 2017 03:00 )   PT: 11.5 SEC;   INR: 1.03          PTT - ( 12 Oct 2017 03:00 )  PTT:23.7 SEC    I&O's Summary    12 Oct 2017 07:01  -  13 Oct 2017 07:00  --------------------------------------------------------  IN: 580 mL / OUT: 2355 mL / NET: -1775 mL    13 Oct 2017 07:01  -  13 Oct 2017 14:27  --------------------------------------------------------  IN: 1160 mL / OUT: 650 mL / NET: 510 mL        RADIOLOGY & ADDITIONAL STUDIES:

## 2017-10-13 NOTE — PHYSICAL THERAPY INITIAL EVALUATION ADULT - RANGE OF MOTION EXAMINATION, REHAB EVAL
Right LE ROM was WFL (within functional limits)/Left Upper Extremity shoulder flexion limited to 90 degrees;  decreased ROM of left hand intrinsics/Right UE ROM was WFL (within functional limits)

## 2017-10-13 NOTE — PROGRESS NOTE ADULT - SUBJECTIVE AND OBJECTIVE BOX
No c/o, states headaches and strength improved  ICU Vital Signs Last 24 Hrs  T(C): 36.6 (13 Oct 2017 00:00), Max: 37 (12 Oct 2017 08:00)  T(F): 97.8 (13 Oct 2017 00:00), Max: 98.6 (12 Oct 2017 08:00)  HR: 73 (13 Oct 2017 02:00) (65 - 92)  BP: 148/79 (13 Oct 2017 02:00) (126/62 - 162/82)  BP(mean): 92 (13 Oct 2017 02:00) (78 - 100)  ABP: 112/57 (12 Oct 2017 11:00) (111/55 - 119/65)  ABP(mean): 78 (12 Oct 2017 11:00) (77 - 87)  RR: 16 (13 Oct 2017 02:00) (12 - 20)  SpO2: 100% (13 Oct 2017 02:00) (99% - 100%)    AAO X 3  PERRLA, EOMI  HSU right 5/5          Left UE 4/5 proximal, spastic paresis left hand          Left LE 4/5    MEDICATIONS  (STANDING):  dexamethasone  Injectable 4 milliGRAM(s) IV Push every 6 hours  enoxaparin Injectable 40 milliGRAM(s) SubCutaneous every 24 hours  influenza   Vaccine 0.5 milliLiter(s) IntraMuscular once  levETIRAcetam  IVPB 500 milliGRAM(s) IV Intermittent <User Schedule>  pantoprazole    Tablet 40 milliGRAM(s) Oral daily  sodium chloride 0.9%. 1000 milliLiter(s) (10 mL/Hr) IV Continuous <Continuous>                          8.8    10.00 )-----------( 218      ( 13 Oct 2017 02:35 )             27.9   10-12    141  |  104  |  7   ----------------------------<  168<H>  3.3<L>   |  22  |  0.46<L>    HMV:     Ca    7.9<L>      12 Oct 2017 03:00  Phos  1.8     10-12  Mg     1.8     10-12    Brain MRI: Evolving postoperative changes, status post resection of right  occipital parietal metastasis

## 2017-10-13 NOTE — PROGRESS NOTE ADULT - SUBJECTIVE AND OBJECTIVE BOX
SICU Daily Progress Note  =====================================================  Interval/Overnight Events:     ***    POD #2          	SICU Day #3    HPI:    57F PMHx breast CA s/p b/l mastectomy () & chemoradiation, brain metastesis s/p multiple craniotomies and radiation, presented to [facility] ED on 10/9/17 complaining of 2 week history of headache, L-sided weakness and difficulty walking. Head CT scan performed in ED was concerning for progression of R parieto-occipital dural metastic disease, vasogenic edema w/ mass effect, effacement of R lateral ventricle & basal cisterns, evidence of subfalcine herniation as well as early transtentorial herniation. Patient was given Mannitol & steroids. She was admitted to Neurosurgery for planned OR today. A stereo tactic brain MRI was performed in preparation for the OR.   Allergies: No Known Allergies      MEDICATIONS:   --------------------------------------------------------------------------------------  Neurologic Medications  levETIRAcetam  IVPB 500 milliGRAM(s) IV Intermittent <User Schedule>    Respiratory Medications    Cardiovascular Medications    Gastrointestinal Medications  pantoprazole    Tablet 40 milliGRAM(s) Oral daily  sodium chloride 0.9%. 1000 milliLiter(s) IV Continuous <Continuous>  sodium phosphate IVPB 15 milliMole(s) IV Intermittent once  sodium phosphate IVPB 15 milliMole(s) IV Intermittent once    Genitourinary Medications    Hematologic/Oncologic Medications  enoxaparin Injectable 40 milliGRAM(s) SubCutaneous every 24 hours  influenza   Vaccine 0.5 milliLiter(s) IntraMuscular once    Antimicrobial/Immunologic Medications    Endocrine/Metabolic Medications  dexamethasone  Injectable 4 milliGRAM(s) IV Push every 6 hours    Topical/Other Medications    --------------------------------------------------------------------------------------    VITAL SIGNS, INS/OUTS (last 24 hours):  --------------------------------------------------------------------------------------  T(C): 36.6 (10-13-17 @ 04:00), Max: 37 (10-12-17 @ 08:00)  HR: 65 (10-13-17 @ 04:00) (65 - 92)  BP: 122/58 (10-13-17 @ 04:00) (122/58 - 162/82)  BP(mean): 74 (10-13-17 @ 04:00) (74 - 100)  ABP: 112/57 (10-12-17 @ 11:00) (111/55 - 119/65)  ABP(mean): 78 (10-12-17 @ 11:00) (77 - 87)  RR: 15 (10-13-17 @ 04:00) (12 - 20)  SpO2: 99% (10-13-17 @ 04:00) (98% - 100%)  Wt(kg): --  CVP(mm Hg): --  CI: --  CAPILLARY BLOOD GLUCOSE       N/A      10-11 @ 07:01  -  10-12 @ 07:00  --------------------------------------------------------  IN:    sodium chloride 0.9%.: 1200 mL  Total IN: 1200 mL    OUT:    Drain: 5 mL    Indwelling Catheter - Urethral: 290 mL    Voided: 325 mL  Total OUT: 620 mL    Total NET: 580 mL      10-12 @ 07:01  -  10-13 @ 05:52  --------------------------------------------------------  IN:    sodium chloride 0.9%.: 550 mL  Total IN: 550 mL    OUT:    Drain: 20 mL    Indwelling Catheter - Urethral: 1925 mL  Total OUT: 1945 mL    Total NET: -1395 mL  --------------------------------------------------------------------------------------    EXAM  NEUROLOGY  Exam: A&Ox3; speech fluid. 2/5 strength of LUE & LLE; drain in place    RESPIRATORY  Exam: breathing comfortably on RA; b/l breath sounds    CARDIOVASCULAR  -no active issues  Exam: regular, no extra heart sounds  Cardiac Rhythm: regular, sinus on telemetry    GI/NUTRITION  Exam: soft, NT/ND  Diet: NPO    VASCULAR  Exam: Extremities warm, pink, well-perfused.     MUSCULOSKELETAL  Exam: All extremities moving spontaneously     SKIN  Exam: Good skin turgor, no skin breakdown.      METABOLIC/FLUIDS/ELECTROLYTES  sodium chloride 0.9%. 1000 milliLiter(s) IV Continuous <Continuous>  sodium phosphate IVPB 15 milliMole(s) IV Intermittent once  sodium phosphate IVPB 15 milliMole(s) IV Intermittent once      HEMATOLOGIC  [x] VTE Prophylaxis: enoxaparin Injectable 40 milliGRAM(s) SubCutaneous every 24 hours    Transfusions:	[] PRBC	[] Platelets		[] FFP	[] Cryoprecipitate    INFECTIOUS DISEASE  Antimicrobials/Immunologic Medications:  influenza   Vaccine 0.5 milliLiter(s) IntraMuscular once    Day #      of     ***    Tubes/Lines/Drains  ***  [x] Peripheral IV  [] Central Venous Line     	[] R	[] L	[] IJ	[] Fem	[] SC	Date Placed:   [] Arterial Line		[] R	[] L	[] Fem	[] Rad	[] Ax	Date Placed:   [] PICC		[] Midline		[] Mediport  [] Urinary Catheter		Date Placed:   [x] Necessity of urinary, arterial, and venous catheters discussed    LABS  --------------------------------------------------------------------------------------  CBC (10-13 @ 02:35)                          8.8<L>                   10.00   )--------------(  218        --    % Neuts, --    % Lymphs, ANC: --                              27.9<L>  CBC (10-12 @ :00)                          10.1<L>                   12.48<H>  )--------------(  234        --    % Neuts, --    % Lymphs, ANC: --                              31.2<L>    BMP (10-13 @ 02:35)       140     |  105     |  7     			Ca++ --      Ca 8.4          ---------------------------------( 156<H>		Mg 2.2          4.1     |  23      |  0.46<L>			Ph 1.2<L>  BMP (10-12 @ 03:00)       141     |  104     |  7     			Ca++ --      Ca 7.9<L>       ---------------------------------( 168<H>		Mg 1.8          3.3<L>  |  22      |  0.46<L>			Ph 1.8<L>    LFTs (10-13 @ 02:35)      TPro 6.2 / Alb 3.4 / TBili < 0.2<L> / DBili -- / AST 15 / ALT 12 / AlkPhos 47    Coags (10-12 @ 03:00)  aPTT 23.7<L> / INR 1.03 / PT 11.5      ABG (10-12 @ 03:00)     7.46<H> / 31<L> / 185<H> / 23 / -2.1 / 99.3<H>%     Lactate: 2.0       Urinalysis (10-11 @ 06:46):     Color: PLYEL / Appearance: CLEAR / S.017 / pH: 6.5 / Gluc: NEGATIVE / Ketones: TRACE / Bili: NEGATIVE / Urobili: NORMAL / Protein :NEGATIVE / Nitrites: NEGATIVE / Leuk.Est: NEGATIVE / RBC: 0-2 / WBC: 0-2 / Sq Epi: OCC / Non Sq Epi:  / Bacteria      --------------------------------------------------------------------------------------    OTHER LABORATORY:     IMAGING STUDIES:   CXR:     ASSESSMENT:  57F PMHx Breast CA w/ brain metastasis s/p multiple craniotomies p/w 2wk history of headache, L sided weakness and difficulty walking on 10/10/17, on CT imaging found to have progression of R dural metastatic disease w/ worsening vasogenic edema resulting in mass effect and signs of early herniation. Admitted to SICU for q1 neuro checks in anticipation of OR today.    Neuro: edema, mass effect & evidence of herniation 2/2 metastatic breast CA of brain   -continue keppra and decadron  -continue q1hr neuro checks  -maintain Na>140-150 (per neurosurgery PA); will start NaCl 3% as necessary  -holding home gabapentin and levetiracetam  -HOB elevated at 45deg    Resp: no acute issues  -encourage IS    CV: no acute issues  -permissive HTN post-op    GI: no acute issues  -NPO  - hold home stool softener, colace and protonix    :   -strict I&O w/ campos  -NS @75/h    Heme:   -restart lovenox tomorrow morning for DVT ppx    ID:   -no acute issues    Endo:   -holding home vit D3 and venkat 600 (calcium supplement)    Lines:   -PIV  -brachial A line to be removed  -andres    Dispo:   -FULL code  -con't SICU care pending OR    --------------------------------------------------------------------------------------    Critical Care Diagnoses: metastatic breast cancer, cerebral edema, delirium, respiratory abnormality SICU Daily Progress Note  =====================================================  Interval/Overnight Events: No acute events overnight. Lovenox restarted.    POD #2          	SICU Day #3    HPI:    57F PMHx breast CA s/p b/l mastectomy () & chemoradiation, brain metastesis s/p multiple craniotomies and radiation, presented to [facility] ED on 10/9/17 complaining of 2 week history of headache, L-sided weakness and difficulty walking. Head CT scan performed in ED was concerning for progression of R parieto-occipital dural metastic disease, vasogenic edema w/ mass effect, effacement of R lateral ventricle & basal cisterns, evidence of subfalcine herniation as well as early transtentorial herniation. Patient was given Mannitol & steroids. She was admitted to Neurosurgery for planned OR today. A stereo tactic brain MRI was performed in preparation for the OR.   Allergies: No Known Allergies      MEDICATIONS:   --------------------------------------------------------------------------------------  Neurologic Medications  levETIRAcetam  IVPB 500 milliGRAM(s) IV Intermittent <User Schedule>    Respiratory Medications    Cardiovascular Medications    Gastrointestinal Medications  pantoprazole    Tablet 40 milliGRAM(s) Oral daily  sodium chloride 0.9%. 1000 milliLiter(s) IV Continuous <Continuous>  sodium phosphate IVPB 15 milliMole(s) IV Intermittent once  sodium phosphate IVPB 15 milliMole(s) IV Intermittent once    Genitourinary Medications    Hematologic/Oncologic Medications  enoxaparin Injectable 40 milliGRAM(s) SubCutaneous every 24 hours  influenza   Vaccine 0.5 milliLiter(s) IntraMuscular once    Antimicrobial/Immunologic Medications    Endocrine/Metabolic Medications  dexamethasone  Injectable 4 milliGRAM(s) IV Push every 6 hours    Topical/Other Medications    --------------------------------------------------------------------------------------    VITAL SIGNS, INS/OUTS (last 24 hours):  --------------------------------------------------------------------------------------  T(C): 36.6 (10-13-17 @ 04:00), Max: 37 (10-12-17 @ 08:00)  HR: 65 (10-13-17 @ 04:00) (65 - 92)  BP: 122/58 (10-13-17 @ 04:00) (122/58 - 162/82)  BP(mean): 74 (10-13-17 @ 04:00) (74 - 100)  ABP: 112/57 (10-12-17 @ 11:00) (111/55 - 119/65)  ABP(mean): 78 (10-12-17 @ 11:00) (77 - 87)  RR: 15 (10-13-17 @ 04:00) (12 - 20)  SpO2: 99% (10-13-17 @ 04:00) (98% - 100%)  Wt(kg): --  CVP(mm Hg): --  CI: --  CAPILLARY BLOOD GLUCOSE       N/A      10-11 @ 07:01  -  10-12 @ 07:00  --------------------------------------------------------  IN:    sodium chloride 0.9%.: 1200 mL  Total IN: 1200 mL    OUT:    Drain: 5 mL    Indwelling Catheter - Urethral: 290 mL    Voided: 325 mL  Total OUT: 620 mL    Total NET: 580 mL      10-12 @ 07:01  -  10-13 @ 05:52  --------------------------------------------------------  IN:    sodium chloride 0.9%.: 550 mL  Total IN: 550 mL    OUT:    Drain: 20 mL    Indwelling Catheter - Urethral: 1925 mL  Total OUT: 1945 mL    Total NET: -1395 mL  --------------------------------------------------------------------------------------    EXAM  NEUROLOGY  Exam: A&Ox3; speech fluid. 2/5 strength of LUE & LLE; drain in place    RESPIRATORY  Exam: breathing comfortably on RA; b/l breath sounds    CARDIOVASCULAR  -no active issues  Exam: regular, no extra heart sounds  Cardiac Rhythm: regular, sinus on telemetry    GI/NUTRITION  Exam: soft, NT/ND  Diet: NPO    VASCULAR  Exam: Extremities warm, pink, well-perfused.     MUSCULOSKELETAL  Exam: All extremities moving spontaneously     SKIN  Exam: Good skin turgor, no skin breakdown.      METABOLIC/FLUIDS/ELECTROLYTES  sodium chloride 0.9%. 1000 milliLiter(s) IV Continuous <Continuous>  sodium phosphate IVPB 15 milliMole(s) IV Intermittent once  sodium phosphate IVPB 15 milliMole(s) IV Intermittent once      HEMATOLOGIC  [x] VTE Prophylaxis: enoxaparin Injectable 40 milliGRAM(s) SubCutaneous every 24 hours    Transfusions:	[] PRBC	[] Platelets		[] FFP	[] Cryoprecipitate    INFECTIOUS DISEASE  Antimicrobials/Immunologic Medications:  influenza   Vaccine 0.5 milliLiter(s) IntraMuscular once    Day #      of     ***    Tubes/Lines/Drains  ***  [x] Peripheral IV  [] Central Venous Line     	[] R	[] L	[] IJ	[] Fem	[] SC	Date Placed:   [] Arterial Line		[] R	[] L	[] Fem	[] Rad	[] Ax	Date Placed:   [] PICC		[] Midline		[] Mediport  [] Urinary Catheter		Date Placed:   [x] Necessity of urinary, arterial, and venous catheters discussed    LABS  --------------------------------------------------------------------------------------  CBC (10-13 @ 02:35)                          8.8<L>                   10.00   )--------------(  218        --    % Neuts, --    % Lymphs, ANC: --                              27.9<L>  CBC (10-12 @ 03:00)                          10.1<L>                   12.48<H>  )--------------(  234        --    % Neuts, --    % Lymphs, ANC: --                              31.2<L>    BMP (10-13 @ 02:35)       140     |  105     |  7     			Ca++ --      Ca 8.4          ---------------------------------( 156<H>		Mg 2.2          4.1     |  23      |  0.46<L>			Ph 1.2<L>  BMP (10-12 @ 03:00)       141     |  104     |  7     			Ca++ --      Ca 7.9<L>       ---------------------------------( 168<H>		Mg 1.8          3.3<L>  |  22      |  0.46<L>			Ph 1.8<L>    LFTs (10-13 @ 02:35)      TPro 6.2 / Alb 3.4 / TBili < 0.2<L> / DBili -- / AST 15 / ALT 12 / AlkPhos 47    Coags (10-12 @ 03:00)  aPTT 23.7<L> / INR 1.03 / PT 11.5      ABG (10-12 @ 03:00)     7.46<H> / 31<L> / 185<H> / 23 / -2.1 / 99.3<H>%     Lactate: 2.0       Urinalysis (10-11 @ 06:46):     Color: PLYEL / Appearance: CLEAR / S.017 / pH: 6.5 / Gluc: NEGATIVE / Ketones: TRACE / Bili: NEGATIVE / Urobili: NORMAL / Protein :NEGATIVE / Nitrites: NEGATIVE / Leuk.Est: NEGATIVE / RBC: 0-2 / WBC: 0-2 / Sq Epi: OCC / Non Sq Epi:  / Bacteria      --------------------------------------------------------------------------------------    OTHER LABORATORY:     IMAGING STUDIES:   CXR:     ASSESSMENT:  57F PMHx Breast CA w/ brain metastasis s/p multiple craniotomies p/w 2wk history of headache, L sided weakness and difficulty walking on 10/10/17, on CT imaging found to have progression of R dural metastatic disease w/ worsening vasogenic edema resulting in mass effect and signs of early herniation. Admitted to SICU for q1 neuro checks in anticipation of OR today.    Neuro: edema, mass effect & evidence of herniation 2/2 metastatic breast CA of brain   -continue keppra and decadron  -continue q1hr neuro checks  -maintain Na>140-150 (per neurosurgery PA); will start NaCl 3% as necessary  -holding home gabapentin and levetiracetam  -HOB elevated at 45deg    Resp: no acute issues  -encourage IS    CV: no acute issues  -permissive HTN post-op    GI: no acute issues  - vegan diet  - hold home stool softener, colace and protonix    :   -strict I&O w/ campos  -NS @75/h    Heme:   - lovenox for DVT ppx    ID:   -no acute issues    Endo:   -holding home vit D3 and venkat 600 (calcium supplement)    Lines:   -PIV  -brachial A line to be removed  -andres    Dispo:   -FULL code  -con't SICU care pending OR    --------------------------------------------------------------------------------------    Critical Care Diagnoses: metastatic breast cancer, cerebral edema, delirium, respiratory abnormality SICU Daily Progress Note  =====================================================  Interval/Overnight Events: No acute events overnight. Lovenox restarted.    POD #2          	SICU Day #3    HPI:    57F PMHx breast CA s/p b/l mastectomy () & chemoradiation, brain metastesis s/p multiple craniotomies and radiation, presented to [facility] ED on 10/9/17 complaining of 2 week history of headache, L-sided weakness and difficulty walking. Head CT scan performed in ED was concerning for progression of R parieto-occipital dural metastic disease, vasogenic edema w/ mass effect, effacement of R lateral ventricle & basal cisterns, evidence of subfalcine herniation as well as early transtentorial herniation. Patient was given Mannitol & steroids. She was admitted to Neurosurgery for planned OR today. A stereo tactic brain MRI was performed in preparation for the OR.   Allergies: No Known Allergies      MEDICATIONS:   --------------------------------------------------------------------------------------  Neurologic Medications  levETIRAcetam  IVPB 500 milliGRAM(s) IV Intermittent <User Schedule>    Respiratory Medications    Cardiovascular Medications    Gastrointestinal Medications  pantoprazole    Tablet 40 milliGRAM(s) Oral daily  sodium chloride 0.9%. 1000 milliLiter(s) IV Continuous <Continuous>  sodium phosphate IVPB 15 milliMole(s) IV Intermittent once  sodium phosphate IVPB 15 milliMole(s) IV Intermittent once    Genitourinary Medications    Hematologic/Oncologic Medications  enoxaparin Injectable 40 milliGRAM(s) SubCutaneous every 24 hours  influenza   Vaccine 0.5 milliLiter(s) IntraMuscular once    Antimicrobial/Immunologic Medications    Endocrine/Metabolic Medications  dexamethasone  Injectable 4 milliGRAM(s) IV Push every 6 hours    Topical/Other Medications    --------------------------------------------------------------------------------------    VITAL SIGNS, INS/OUTS (last 24 hours):  --------------------------------------------------------------------------------------  T(C): 36.6 (10-13-17 @ 04:00), Max: 37 (10-12-17 @ 08:00)  HR: 65 (10-13-17 @ 04:00) (65 - 92)  BP: 122/58 (10-13-17 @ 04:00) (122/58 - 162/82)  BP(mean): 74 (10-13-17 @ 04:00) (74 - 100)  ABP: 112/57 (10-12-17 @ 11:00) (111/55 - 119/65)  ABP(mean): 78 (10-12-17 @ 11:00) (77 - 87)  RR: 15 (10-13-17 @ 04:00) (12 - 20)  SpO2: 99% (10-13-17 @ 04:00) (98% - 100%)  Wt(kg): --  CVP(mm Hg): --  CI: --  CAPILLARY BLOOD GLUCOSE       N/A      10-11 @ 07:01  -  10-12 @ 07:00  --------------------------------------------------------  IN:    sodium chloride 0.9%.: 1200 mL  Total IN: 1200 mL    OUT:    Drain: 5 mL    Indwelling Catheter - Urethral: 290 mL    Voided: 325 mL  Total OUT: 620 mL    Total NET: 580 mL      10-12 @ 07:01  -  10-13 @ 05:52  --------------------------------------------------------  IN:    sodium chloride 0.9%.: 550 mL  Total IN: 550 mL    OUT:    Drain: 20 mL    Indwelling Catheter - Urethral: 1925 mL  Total OUT: 1945 mL    Total NET: -1395 mL  --------------------------------------------------------------------------------------    EXAM  NEUROLOGY  Exam: A&Ox3; speech fluid. 2/5 strength of LUE & LLE; drain in place    RESPIRATORY  Exam: breathing comfortably on RA; b/l breath sounds    CARDIOVASCULAR  -no active issues  Exam: regular, no extra heart sounds    GI/NUTRITION  Exam: soft, NT/ND  Diet: regular vegan    VASCULAR  Exam: Extremities warm, pink, well-perfused.     MUSCULOSKELETAL  Exam: All extremities moving spontaneously     SKIN  Exam: Good skin turgor, no skin breakdown.      METABOLIC/FLUIDS/ELECTROLYTES  sodium chloride 0.9%. 1000 milliLiter(s) IV Continuous <Continuous>  sodium phosphate IVPB 15 milliMole(s) IV Intermittent once  sodium phosphate IVPB 15 milliMole(s) IV Intermittent once      HEMATOLOGIC  [x] VTE Prophylaxis: enoxaparin Injectable 40 milliGRAM(s) SubCutaneous every 24 hours    Transfusions:	[] PRBC	[] Platelets		[] FFP	[] Cryoprecipitate    INFECTIOUS DISEASE  Antimicrobials/Immunologic Medications:  influenza   Vaccine 0.5 milliLiter(s) IntraMuscular once      Tubes/Lines/Drains   [x] Peripheral IV  [] Central Venous Line     	[] R	[] L	[] IJ	[] Fem	[] SC	Date Placed:   [] Arterial Line		[] R	[] L	[] Fem	[] Rad	[] Ax	Date Placed:   [] PICC		[] Midline		[] Mediport  [] Urinary Catheter		Date Placed:   [x] Necessity of urinary, arterial, and venous catheters discussed    LABS  --------------------------------------------------------------------------------------  CBC (10-13 @ 02:35)                          8.8<L>                   10.00   )--------------(  218        --    % Neuts, --    % Lymphs, ANC: --                              27.9<L>  CBC (10-12 @ 03:00)                          10.1<L>                   12.48<H>  )--------------(  234        --    % Neuts, --    % Lymphs, ANC: --                              31.2<L>    BMP (10-13 @ 02:35)       140     |  105     |  7     			Ca++ --      Ca 8.4          ---------------------------------( 156<H>		Mg 2.2          4.1     |  23      |  0.46<L>			Ph 1.2<L>  BMP (10-12 @ 03:00)       141     |  104     |  7     			Ca++ --      Ca 7.9<L>       ---------------------------------( 168<H>		Mg 1.8          3.3<L>  |  22      |  0.46<L>			Ph 1.8<L>    LFTs (10-13 @ 02:35)      TPro 6.2 / Alb 3.4 / TBili < 0.2<L> / DBili -- / AST 15 / ALT 12 / AlkPhos 47    Coags (10-12 @ 03:00)  aPTT 23.7<L> / INR 1.03 / PT 11.5      ABG (10-12 @ 03:00)     7.46<H> / 31<L> / 185<H> / 23 / -2.1 / 99.3<H>%     Lactate: 2.0       Urinalysis (10-11 @ 06:46):     Color: PLYEL / Appearance: CLEAR / S.017 / pH: 6.5 / Gluc: NEGATIVE / Ketones: TRACE / Bili: NEGATIVE / Urobili: NORMAL / Protein :NEGATIVE / Nitrites: NEGATIVE / Leuk.Est: NEGATIVE / RBC: 0-2 / WBC: 0-2 / Sq Epi: OCC / Non Sq Epi:  / Bacteria      --------------------------------------------------------------------------------------    OTHER LABORATORY:     IMAGING STUDIES:   CXR:     ASSESSMENT:  57F PMHx Breast CA w/ brain metastasis s/p multiple craniotomies p/w 2wk history of headache, L sided weakness and difficulty walking on 10/10/17, on CT imaging found to have progression of R dural metastatic disease w/ worsening vasogenic edema resulting in mass effect and signs of early herniation. Post op and doing well.    Neuro: edema, mass effect & evidence of herniation 2/2 metastatic breast CA of brain   -continue keppra and decadron  -transition to q4hr neuro checks  -holding home gabapentin and levetiracetam  -HOB elevated at 45deg    Resp: no acute issues  -encourage IS    CV: no acute issues  -permissive HTN post-op    GI: no acute issues  - vegan diet  - hold home stool softener, colace and protonix    :   -strict I&O w/ campos  -NS @75/h    Heme:   - lovenox for DVT ppx    ID:   -no acute issues    Endo:   -holding home vit D3 and venkat 600 (calcium supplement)    Lines:   -PIV  -campos  -drain to be removed by neurosurgery    Dispo:   -FULL code  -to floor today    --------------------------------------------------------------------------------------    Critical Care Diagnoses: metastatic breast cancer, cerebral edema, delirium, respiratory abnormality SICU Daily Progress Note  =====================================================  Interval/Overnight Events: No acute events overnight. Lovenox restarted.    POD #2          	SICU Day #3    HPI:    57F PMHx breast CA s/p b/l mastectomy () & chemoradiation, brain metastesis s/p multiple craniotomies and radiation, presented to [facility] ED on 10/9/17 complaining of 2 week history of headache, L-sided weakness and difficulty walking. Head CT scan performed in ED was concerning for progression of R parieto-occipital dural metastic disease, vasogenic edema w/ mass effect, effacement of R lateral ventricle & basal cisterns, evidence of subfalcine herniation as well as early transtentorial herniation. Patient was given Mannitol & steroids. She was admitted to Neurosurgery for planned OR today. A stereo tactic brain MRI was performed in preparation for the OR.   Allergies: No Known Allergies      MEDICATIONS:   --------------------------------------------------------------------------------------  Neurologic Medications  levETIRAcetam  IVPB 500 milliGRAM(s) IV Intermittent <User Schedule>    Respiratory Medications    Cardiovascular Medications    Gastrointestinal Medications  pantoprazole    Tablet 40 milliGRAM(s) Oral daily  sodium chloride 0.9%. 1000 milliLiter(s) IV Continuous <Continuous>  sodium phosphate IVPB 15 milliMole(s) IV Intermittent once  sodium phosphate IVPB 15 milliMole(s) IV Intermittent once    Genitourinary Medications    Hematologic/Oncologic Medications  enoxaparin Injectable 40 milliGRAM(s) SubCutaneous every 24 hours  influenza   Vaccine 0.5 milliLiter(s) IntraMuscular once    Antimicrobial/Immunologic Medications    Endocrine/Metabolic Medications  dexamethasone  Injectable 4 milliGRAM(s) IV Push every 6 hours    Topical/Other Medications    --------------------------------------------------------------------------------------    VITAL SIGNS, INS/OUTS (last 24 hours):  --------------------------------------------------------------------------------------  T(C): 36.6 (10-13-17 @ 04:00), Max: 37 (10-12-17 @ 08:00)  HR: 65 (10-13-17 @ 04:00) (65 - 92)  BP: 122/58 (10-13-17 @ 04:00) (122/58 - 162/82)  BP(mean): 74 (10-13-17 @ 04:00) (74 - 100)  ABP: 112/57 (10-12-17 @ 11:00) (111/55 - 119/65)  ABP(mean): 78 (10-12-17 @ 11:00) (77 - 87)  RR: 15 (10-13-17 @ 04:00) (12 - 20)  SpO2: 99% (10-13-17 @ 04:00) (98% - 100%)  Wt(kg): --  CVP(mm Hg): --  CI: --  CAPILLARY BLOOD GLUCOSE       N/A      10-11 @ 07:01  -  10-12 @ 07:00  --------------------------------------------------------  IN:    sodium chloride 0.9%.: 1200 mL  Total IN: 1200 mL    OUT:    Drain: 5 mL    Indwelling Catheter - Urethral: 290 mL    Voided: 325 mL  Total OUT: 620 mL    Total NET: 580 mL      10-12 @ 07:01  -  10-13 @ 05:52  --------------------------------------------------------  IN:    sodium chloride 0.9%.: 550 mL  Total IN: 550 mL    OUT:    Drain: 20 mL    Indwelling Catheter - Urethral: 1925 mL  Total OUT: 1945 mL    Total NET: -1395 mL  --------------------------------------------------------------------------------------    EXAM  NEUROLOGY  Exam: A&Ox3; speech fluid. 2/5 strength of LUE & LLE; drain in place    RESPIRATORY  Exam: breathing comfortably on RA; b/l breath sounds    CARDIOVASCULAR  -no active issues  Exam: regular, no extra heart sounds    GI/NUTRITION  Exam: soft, NT/ND  Diet: regular vegan    VASCULAR  Exam: Extremities warm, pink, well-perfused.     MUSCULOSKELETAL  Exam: All extremities moving spontaneously     SKIN  Exam: Good skin turgor, no skin breakdown.      METABOLIC/FLUIDS/ELECTROLYTES  sodium chloride 0.9%. 1000 milliLiter(s) IV Continuous <Continuous>  sodium phosphate IVPB 15 milliMole(s) IV Intermittent once  sodium phosphate IVPB 15 milliMole(s) IV Intermittent once      HEMATOLOGIC  [x] VTE Prophylaxis: enoxaparin Injectable 40 milliGRAM(s) SubCutaneous every 24 hours    Transfusions:	[] PRBC	[] Platelets		[] FFP	[] Cryoprecipitate    INFECTIOUS DISEASE  Antimicrobials/Immunologic Medications:  influenza   Vaccine 0.5 milliLiter(s) IntraMuscular once      Tubes/Lines/Drains   [x] Peripheral IV  [] Central Venous Line     	[] R	[] L	[] IJ	[] Fem	[] SC	Date Placed:   [] Arterial Line		[] R	[] L	[] Fem	[] Rad	[] Ax	Date Placed:   [] PICC		[] Midline		[] Mediport  [] Urinary Catheter		Date Placed:   [x] Necessity of urinary, arterial, and venous catheters discussed    LABS  --------------------------------------------------------------------------------------  CBC (10-13 @ 02:35)                          8.8<L>                   10.00   )--------------(  218        --    % Neuts, --    % Lymphs, ANC: --                              27.9<L>  CBC (10-12 @ 03:00)                          10.1<L>                   12.48<H>  )--------------(  234        --    % Neuts, --    % Lymphs, ANC: --                              31.2<L>    BMP (10-13 @ 02:35)       140     |  105     |  7     			Ca++ --      Ca 8.4          ---------------------------------( 156<H>		Mg 2.2          4.1     |  23      |  0.46<L>			Ph 1.2<L>  BMP (10-12 @ 03:00)       141     |  104     |  7     			Ca++ --      Ca 7.9<L>       ---------------------------------( 168<H>		Mg 1.8          3.3<L>  |  22      |  0.46<L>			Ph 1.8<L>    LFTs (10-13 @ 02:35)      TPro 6.2 / Alb 3.4 / TBili < 0.2<L> / DBili -- / AST 15 / ALT 12 / AlkPhos 47    Coags (10-12 @ 03:00)  aPTT 23.7<L> / INR 1.03 / PT 11.5      ABG (10-12 @ 03:00)     7.46<H> / 31<L> / 185<H> / 23 / -2.1 / 99.3<H>%     Lactate: 2.0       Urinalysis (10-11 @ 06:46):     Color: PLYEL / Appearance: CLEAR / S.017 / pH: 6.5 / Gluc: NEGATIVE / Ketones: TRACE / Bili: NEGATIVE / Urobili: NORMAL / Protein :NEGATIVE / Nitrites: NEGATIVE / Leuk.Est: NEGATIVE / RBC: 0-2 / WBC: 0-2 / Sq Epi: OCC / Non Sq Epi:  / Bacteria      --------------------------------------------------------------------------------------    OTHER LABORATORY:     IMAGING STUDIES:   < from: CT Abdomen and Pelvis w/ IV Cont (10.13.17 @ 09:36) >  EXAM:  CT CHEST IC      EXAM:  CT ABDOMEN AND PELVIS IC        PROCEDURE DATE:  Oct 13 2017         INTERPRETATION:  CLINICAL INFORMATION: Breast cancer staging.    COMPARISON: CT chest from February 15, 2016.    PROCEDURE:   CT of the Chest, Abdomen and Pelvis was performed with intravenous   contrast.   Intravenous contrast: 90 mL Omnipaque 350. 10 mL discarded.  Oral contrast: None.  Sagittal and coronal reformats were performed.    FINDINGS:    CHEST:     LUNGS AND LARGE AIRWAYS: Previouslyseen large bilateral pulmonary   nodules have resolved or decreased in size. For example:  *  Medial right upper lobe (series 2, image 28), measuring 0.6 cm,   previously 1.1 cm  *  Lateral right upper lobe (series 2, image 30), measuring 0.4 cm,   previously 1.0 cm  PLEURA: No pleural effusion.  VESSELS: Right chest wall port with catheter tip terminating superior   cavoatrial junction.   HEART: Heart size is normal. No pericardial effusion.  MEDIASTINUM AND DARNELL: Increased size of right hilar node (series 2, image   29), measuring 3.3 x 2.6 cm, previously 2.1 x 1.4 cm.  CHEST WALL AND LOWER NECK: Bilateral mastectomies. Right chest wall port.    ABDOMEN AND PELVIS:    LIVER: Within normal limits.  BILE DUCTS: Normal caliber.   GALLBLADDER: Within normal limits.  SPLEEN: Within normal limits.  PANCREAS: Within normal limits.  ADRENALS: Within normal limits.  KIDNEYS/URETERS: Within normal limits.     BLADDER: Small amount of fluid and air within the urinary bladder, which   contains a Campos catheter balloon.  REPRODUCTIVE ORGANS: The uterus and adnexa are within normal limits.    BOWEL: No bowel obstruction. Normal appendix.    PERITONEUM: No ascites.  VESSELS:  Within normal limits.  RETROPERITONEUM: No lymphadenopathy.    ABDOMINALWALL: Within normal limits.  BONES: Within normal limits.    IMPRESSION:   1.  Since 2016, decrease size of bilateral pulmonary nodules.  2.  Increased size of right hilar node.    < end of copied text >      ASSESSMENT:  57F PMHx Breast CA w/ brain metastasis s/p multiple craniotomies p/w 2wk history of headache, L sided weakness and difficulty walking on 10/10/17, on CT imaging found to have progression of R dural metastatic disease w/ worsening vasogenic edema resulting in mass effect and signs of early herniation. Post op and doing well.    Neuro: edema, mass effect & evidence of herniation 2/2 metastatic breast CA of brain   -continue keppra and decadron  -transition to q4hr neuro checks  -holding home gabapentin and levetiracetam  -HOB elevated at 45deg    Resp: no acute issues  -encourage IS    CV: no acute issues  -permissive HTN post-op    GI: no acute issues  - vegan diet  - hold home stool softener, colace and protonix    :   -strict I&O w/ campos  -NS @75/h    Heme:   - lovenox for DVT ppx    ID:   -no acute issues    Endo:   -holding home vit D3 and venkat 600 (calcium supplement)    Lines:   -PIV  -campos  -drain to be removed by neurosurgery    Dispo:   -FULL code  -to floor today    --------------------------------------------------------------------------------------    Critical Care Diagnoses: metastatic breast cancer, cerebral edema, delirium, respiratory abnormality

## 2017-10-13 NOTE — PROGRESS NOTE ADULT - ASSESSMENT
58 YO female clinically and symptomatically improved s/p resection of recurrent occipital metastases

## 2017-10-13 NOTE — PROGRESS NOTE ADULT - ATTENDING COMMENTS
57F PMHx Breast CA w/ brain metastasis s/p multiple craniotomies p/w 2wk history of headache, L sided weakness and difficulty walking on 10/10/17, on CT imaging found to have progression of R dural metastatic disease w/ worsening vasogenic edema resulting in mass effect and signs of early herniation. Admitted to SICU for q1 neuro checks in anticipation of OR today.    Neuro: edema, mass effect & evidence of herniation 2/2 metastatic breast CA of brain   -con't keppra and decadron  -pre-op for OR today (T&S, coags, CBC, MRI)  -maintain Na>140-150 (per neurosurgery PA); will start NaCl 3% as necessary  -holding home gabapentin and levetiracetam    Resp: no acute issues  -encourage OOB & IS    CV: no acute issues  -monitor closely for HTN in setting of early herniation    GI: no acute issues  -NPO for OR  -holding home stool softener, colace and protonix    :   -strict I&O  -NS @75/h  -f/u repeat UA    Heme: DVTppx  -holding chemical ppx in setting of OR  -start lovenox when appropriate post-op    ID: no acute issues    Endo:   -holding home vit D3 and venkat 600 (calcium supplement)    Lines:   -PIV    Dispo:   -FULL code  -con't SICU care pending OR      (Please contact [facility] SICU resident [phone], or PA [phone] with questions/concerns.)      --------------------------------------------------------------------------------------    Critical Care Diagnoses:  Delirium  cerebral edema     The patient is a critical care patient with hemodynamic and metabolic instability in SICU.  I have personally interviewed and examined this patient, reviewed labs and x-rays, discussed with other consultants, House staff and PA's.  I spent   55  minutes  in total providing critical care for the diagnoses, assessment and plan above.  These diagnoses are unrelated to the surgical procedure noted above.  I met with family     min to get further history and make care decisions for this patient who is unable to participate due to altered mental status.  Time involved in performance of separately billable procedures was not counted toward my critical care time.  There is no overlap.
57F PMHx Breast CA w/ brain metastasis s/p multiple craniotomies p/w 2wk history of headache, L sided weakness and difficulty walking on 10/10/17, on CT imaging found to have progression of R dural metastatic disease w/ worsening vasogenic edema resulting in mass effect and signs of early herniation. Admitted to SICU for q1 neuro checks in anticipation of OR today.    Neuro: edema, mass effect & evidence of herniation 2/2 metastatic breast CA of brain   -continue keppra and decadron  -continue q1hr neuro checks  -maintain Na>140-150 (per neurosurgery PA); will start NaCl 3% as necessary  -holding home gabapentin and levetiracetam  -HOB elevated at 45deg    Resp: no acute issues  -encourage IS    CV: no acute issues  -permissive HTN post-op    GI: no acute issues  -NPO  - hold home stool softener, colace and protonix    :   -strict I&O w/ campos  -NS @75/h    Heme:   -restart lovenox tomorrow morning for DVT ppx    ID:   -no acute issues    Endo:   -holding home vit D3 and venkat 600 (calcium supplement)    Lines:   -PIV  -brachial A line to be removed  -campos    Dispo:   -FULL code  -con't SICU care pending OR    --------------------------------------------------------------------------------------    Critical Care Diagnoses: metastatic breast cancer, cerebral edema, delirium, respiratory abnormality    The patient is a critical care patient with hemodynamic and metabolic instability in SICU.  I have personally interviewed and examined this patient, reviewed labs and x-rays, discussed with other consultants, House staff and PA's.  I spent  45   minutes  in total providing critical care for the diagnoses, assessment and plan above.  These diagnoses are unrelated to the surgical procedure noted above.  I met with family     min to get further history and make care decisions for this patient who is unable to participate due to altered mental status.  Time involved in performance of separately billable procedures was not counted toward my critical care time.  There is no overlap.
57F PMHx Breast CA w/ brain metastasis s/p multiple craniotomies p/w 2wk history of headache, L sided weakness and difficulty walking on 10/10/17, on CT imaging found to have progression of R dural metastatic disease w/ worsening vasogenic edema resulting in mass effect and signs of early herniation. Post op and doing well.    Neuro: edema, mass effect & evidence of herniation 2/2 metastatic breast CA of brain   -continue keppra and decadron  -transition to q4hr neuro checks  -holding home gabapentin and levetiracetam  -HOB elevated at 45deg    Resp: no acute issues  -encourage IS    CV: no acute issues  -permissive HTN post-op    GI: no acute issues  - vegan diet  - hold home stool softener, colace and protonix    :   -strict I&O w/ campos  -NS @75/h    Heme:   - lovenox for DVT ppx    ID:   -no acute issues    Endo:   -holding home vit D3 and venkat 600 (calcium supplement)    Lines:   -PIV  -campos  -drain to be removed by neurosurgery    Dispo:   -FULL code  -to floor today    --------------------------------------------------------------------------------------    Critical Care Diagnoses: metastatic breast cancer, cerebral edema, delirium, respiratory abnormality    The patient is a critical care patient with hemodynamic and metabolic instability in SICU.  I have personally interviewed and examined this patient, reviewed labs and x-rays, discussed with other consultants, House staff and PA's.  I spent  66   minutes  in total providing critical care for the diagnoses, assessment and plan above.  These diagnoses are unrelated to the surgical procedure noted above.  I met with family     min to get further history and make care decisions for this patient who is unable to participate due to altered mental status.  Time involved in performance of separately billable procedures was not counted toward my critical care time.  There is no overlap.
Pt seen with fellow.  Agree with above.  Goal is home and to follow up with onc as outpatient
Pt seen with fellow.  Agree with above.  s/p surgery.  goal is home with hospice.  will need to speak with ex  and daughter

## 2017-10-13 NOTE — PROGRESS NOTE ADULT - PROBLEM SELECTOR PLAN 1
Underwent neurosurgical intervention this admission  Oncology consult regarding treatment plan / options - likely no further chemo  Patient reports she was told she is a candidate for outpatient radiation treatment

## 2017-10-14 ENCOUNTER — TRANSCRIPTION ENCOUNTER (OUTPATIENT)
Age: 57
End: 2017-10-14

## 2017-10-14 VITALS
SYSTOLIC BLOOD PRESSURE: 134 MMHG | TEMPERATURE: 98 F | HEART RATE: 72 BPM | OXYGEN SATURATION: 100 % | DIASTOLIC BLOOD PRESSURE: 71 MMHG

## 2017-10-14 LAB
BUN SERPL-MCNC: 9 MG/DL — SIGNIFICANT CHANGE UP (ref 7–23)
CALCIUM SERPL-MCNC: 8.7 MG/DL — SIGNIFICANT CHANGE UP (ref 8.4–10.5)
CHLORIDE SERPL-SCNC: 104 MMOL/L — SIGNIFICANT CHANGE UP (ref 98–107)
CO2 SERPL-SCNC: 23 MMOL/L — SIGNIFICANT CHANGE UP (ref 22–31)
CREAT SERPL-MCNC: 0.49 MG/DL — LOW (ref 0.5–1.3)
GLUCOSE SERPL-MCNC: 134 MG/DL — HIGH (ref 70–99)
HCT VFR BLD CALC: 26.6 % — LOW (ref 34.5–45)
HGB BLD-MCNC: 8.2 G/DL — LOW (ref 11.5–15.5)
MAGNESIUM SERPL-MCNC: 2.2 MG/DL — SIGNIFICANT CHANGE UP (ref 1.6–2.6)
MCHC RBC-ENTMCNC: 28.5 PG — SIGNIFICANT CHANGE UP (ref 27–34)
MCHC RBC-ENTMCNC: 30.8 % — LOW (ref 32–36)
MCV RBC AUTO: 92.4 FL — SIGNIFICANT CHANGE UP (ref 80–100)
NRBC # FLD: 0 — SIGNIFICANT CHANGE UP
PHOSPHATE SERPL-MCNC: 2.5 MG/DL — SIGNIFICANT CHANGE UP (ref 2.5–4.5)
PLATELET # BLD AUTO: 234 K/UL — SIGNIFICANT CHANGE UP (ref 150–400)
PMV BLD: 9.2 FL — SIGNIFICANT CHANGE UP (ref 7–13)
POTASSIUM SERPL-MCNC: 4 MMOL/L — SIGNIFICANT CHANGE UP (ref 3.5–5.3)
POTASSIUM SERPL-SCNC: 4 MMOL/L — SIGNIFICANT CHANGE UP (ref 3.5–5.3)
RBC # BLD: 2.88 M/UL — LOW (ref 3.8–5.2)
RBC # FLD: 16.8 % — HIGH (ref 10.3–14.5)
SODIUM SERPL-SCNC: 140 MMOL/L — SIGNIFICANT CHANGE UP (ref 135–145)
WBC # BLD: 7.93 K/UL — SIGNIFICANT CHANGE UP (ref 3.8–10.5)
WBC # FLD AUTO: 7.93 K/UL — SIGNIFICANT CHANGE UP (ref 3.8–10.5)

## 2017-10-14 RX ORDER — PANTOPRAZOLE SODIUM 20 MG/1
1 TABLET, DELAYED RELEASE ORAL
Qty: 30 | Refills: 0 | OUTPATIENT
Start: 2017-10-14 | End: 2017-11-13

## 2017-10-14 RX ORDER — ACETAMINOPHEN 500 MG
2 TABLET ORAL
Qty: 0 | Refills: 0 | COMMUNITY

## 2017-10-14 RX ORDER — LEVETIRACETAM 250 MG/1
1 TABLET, FILM COATED ORAL
Qty: 60 | Refills: 0 | OUTPATIENT
Start: 2017-10-14 | End: 2017-11-13

## 2017-10-14 RX ADMIN — Medication 4 MILLIGRAM(S): at 05:41

## 2017-10-14 RX ADMIN — PANTOPRAZOLE SODIUM 40 MILLIGRAM(S): 20 TABLET, DELAYED RELEASE ORAL at 12:36

## 2017-10-14 RX ADMIN — LEVETIRACETAM 400 MILLIGRAM(S): 250 TABLET, FILM COATED ORAL at 12:36

## 2017-10-14 RX ADMIN — ENOXAPARIN SODIUM 40 MILLIGRAM(S): 100 INJECTION SUBCUTANEOUS at 05:41

## 2017-10-14 RX ADMIN — Medication 4 MILLIGRAM(S): at 14:48

## 2017-10-14 RX ADMIN — LEVETIRACETAM 400 MILLIGRAM(S): 250 TABLET, FILM COATED ORAL at 01:10

## 2017-10-14 NOTE — DISCHARGE NOTE ADULT - CARE PROVIDERS DIRECT ADDRESSES
[email or web address]

## 2017-10-14 NOTE — DISCHARGE NOTE ADULT - INSTRUCTIONS
diet- regular  activity-FWBAT keep the site dry and intact, f/u with md as ordered, notify md if you have any unusual symptoms

## 2017-10-14 NOTE — DISCHARGE NOTE ADULT - CARE PROVIDER_API CALL
Ned Estes (MD), Neurosurgery  General  [address] [address]  Phone: [phone]  Fax: [phone] Ned Estes), Neurosurgery  General  [address] [address]  Phone: [phone]  Fax: [phone]    Poli Capps), Therapeutic Radiology  [address] [address]  Phone: [phone]  Fax: [phone]

## 2017-10-14 NOTE — DISCHARGE NOTE ADULT - MEDICATION SUMMARY - MEDICATIONS TO STOP TAKING
I will STOP taking the medications listed below when I get home from the hospital:    dexamethasone 2 mg oral tablet  -- 2 tab(s) PO 6H x2 days, then 2 tabsPO Q8H x2 days, then 2 tabs POQ12H x2 days, then2 tabPO daily x2 days, then1 tab podaily x2 days then d/c MDD:16mg  -- It is very important that you take or use this exactly as directed.  Do not skip doses or discontinue unless directed by your doctor.  Obtain medical advice before taking any non-prescription drugs as some may affect the action of this medication.  Take with food or milk.    oxyCODONE 5 mg oral tablet  -- 1 tab(s) by mouth every 4 hours, As needed, Moderate Pain (4 - 6) MDD:6    gabapentin 300 mg oral tablet  -- 300 milligram(s) by mouth 3 times a day, As Needed MDD:900mg    dexamethasone 4 mg oral tablet  -- 1 tab(s) by mouth 2 times a day x 7 days  -- It is very important that you take or use this exactly as directed.  Do not skip doses or discontinue unless directed by your doctor.  Obtain medical advice before taking any non-prescription drugs as some may affect the action of this medication.  Take with food or milk.

## 2017-10-14 NOTE — DISCHARGE NOTE ADULT - CONDITIONS AT DISCHARGE
patient alert and orientedx4, ambulating well ans steady, tolerating to diet, voids qs, vss, incision on posterior head with sutures is LOREN dry and intact. d/c instruction given as ordered

## 2017-10-14 NOTE — DISCHARGE NOTE ADULT - PATIENT PORTAL LINK FT
“You can access the Follow[employer] Patient Portal, offered by [facility], by registering with the following website: [email or web address]”

## 2017-10-14 NOTE — DISCHARGE NOTE ADULT - CARE PLAN
Principal Discharge DX:	Brain metastases  Goal:	s/p craniotomy for resection of lesion  Instructions for follow-up, activity and diet:	see above

## 2017-10-14 NOTE — DISCHARGE NOTE ADULT - MEDICATION SUMMARY - MEDICATIONS TO TAKE
I will START or STAY ON the medications listed below when I get home from the hospital:    venkat 600  -- 1 tab(s) by mouth once a day  -- Indication: For Home med    dexamethasone 1 mg oral tablet  -- 3 tab(s) by mouth every 8 hours x 5 days, then 3 tabs PO Q12H x 5 days, then 2 tabs PO Q12 hours MDD:9 tabs  -- It is very important that you take or use this exactly as directed.  Do not skip doses or discontinue unless directed by your doctor.  Obtain medical advice before taking any non-prescription drugs as some may affect the action of this medication.  Take with food or milk.    -- Indication: For edema    Keppra 500 mg oral tablet  -- 1 tab(s) by mouth 2 times a day MDD:2 tabs  -- Check with your doctor before becoming pregnant.  It is very important that you take or use this exactly as directed.  Do not skip doses or discontinue unless directed by your doctor.  May cause drowsiness or dizziness.  Obtain medical advice before taking any non-prescription drugs as some may affect the action of this medication.  Swallow whole.  Do not crush.  This drug may impair the ability to drive or operate machinery.  Use care until you become familiar with its effects.    -- Indication: For Seizure ppx    docusate sodium 100 mg oral capsule  -- 1 cap(s) by mouth 3 times a day  -- Indication: For Bowel regimen    pantoprazole 40 mg oral delayed release tablet  -- 1 tab(s) by mouth once a day (before a meal) MDD:1 tab  -- Indication: For Gi ppx    Vitamin D3 1000 intl units oral capsule  -- 1 cap(s) by mouth once a day  -- Indication: For Home med

## 2017-10-14 NOTE — PROGRESS NOTE ADULT - SUBJECTIVE AND OBJECTIVE BOX
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2     10-13    TPro  6.2  /  Alb  3.4  /  TBili  < 0.2<L>  /  DBili  x   /  AST  15  /  ALT  12  /  AlkPhos  47  10-13

## 2017-10-15 RX ORDER — DEXAMETHASONE 0.5 MG/5ML
3 ELIXIR ORAL
Qty: 189 | Refills: 0 | OUTPATIENT
Start: 2017-10-15 | End: 2017-11-05

## 2017-10-24 ENCOUNTER — APPOINTMENT (OUTPATIENT)
Dept: NEUROSURGERY | Facility: CLINIC | Age: 57
End: 2017-10-24
Payer: MEDICAID

## 2017-10-24 VITALS
BODY MASS INDEX: 25.52 KG/M2 | DIASTOLIC BLOOD PRESSURE: 72 MMHG | HEART RATE: 111 BPM | WEIGHT: 130 LBS | HEIGHT: 60 IN | SYSTOLIC BLOOD PRESSURE: 108 MMHG

## 2017-10-24 DIAGNOSIS — C79.31 SECONDARY MALIGNANT NEOPLASM OF BRAIN: ICD-10-CM

## 2017-10-24 DIAGNOSIS — Z98.890 OTHER SPECIFIED POSTPROCEDURAL STATES: ICD-10-CM

## 2017-10-24 DIAGNOSIS — C79.31 MALIGNANT NEOPLASM OF UNSPECIFIED SITE OF UNSPECIFIED FEMALE BREAST: ICD-10-CM

## 2017-10-24 DIAGNOSIS — C50.919 MALIGNANT NEOPLASM OF UNSPECIFIED SITE OF UNSPECIFIED FEMALE BREAST: ICD-10-CM

## 2017-10-24 PROCEDURE — 99024 POSTOP FOLLOW-UP VISIT: CPT

## 2017-10-25 PROBLEM — C79.31 BRAIN METASTASES: Status: ACTIVE | Noted: 2017-03-28

## 2017-10-25 PROBLEM — C50.919 MALIGNANT NEOPLASM OF BREAST METASTATIC TO BRAIN: Status: ACTIVE | Noted: 2017-10-25

## 2017-10-25 PROBLEM — Z98.890 S/P CRANIOTOMY: Status: ACTIVE | Noted: 2017-07-05

## 2017-10-26 ENCOUNTER — APPOINTMENT (OUTPATIENT)
Dept: RADIATION ONCOLOGY | Facility: CLINIC | Age: 57
End: 2017-10-26
Payer: MEDICAID

## 2017-10-26 ENCOUNTER — OUTPATIENT (OUTPATIENT)
Dept: OUTPATIENT SERVICES | Facility: HOSPITAL | Age: 57
LOS: 1 days | Discharge: HOME | End: 2017-10-26

## 2017-10-26 VITALS
HEIGHT: 60 IN | DIASTOLIC BLOOD PRESSURE: 74 MMHG | HEART RATE: 75 BPM | WEIGHT: 129.63 LBS | OXYGEN SATURATION: 98 % | TEMPERATURE: 97.8 F | SYSTOLIC BLOOD PRESSURE: 107 MMHG | BODY MASS INDEX: 25.45 KG/M2 | RESPIRATION RATE: 16 BRPM

## 2017-10-26 DIAGNOSIS — Z98.890 OTHER SPECIFIED POSTPROCEDURAL STATES: Chronic | ICD-10-CM

## 2017-10-26 DIAGNOSIS — Z90.13 ACQUIRED ABSENCE OF BILATERAL BREASTS AND NIPPLES: Chronic | ICD-10-CM

## 2017-10-26 PROCEDURE — 99214 OFFICE O/P EST MOD 30 MIN: CPT

## 2017-11-13 NOTE — ED PROVIDER NOTE - CPE EDP GASTRO NORM
- increased amlodipine to 10mg daily on 11/7   - cont atenolol  - cont losartan  - cont to monitor BP/HR, adjust meds prn    Vitals:    11/12/17 0700 11/12/17 1100 11/12/17 1850 11/13/17 0700   BP: (!) 179/74 138/66 (!) 161/67 (!) 169/74   BP Location: Left arm Left arm Left arm Left arm   Patient Position: Sitting Sitting Sitting Lying   Pulse: 65  66 69   Resp: 16  17 18   Temp: 97.9 °F (36.6 °C)  98.9 °F (37.2 °C) 98 °F (36.7 °C)   TempSrc: Oral  Oral Oral   SpO2: (!) 94%  95% 97%   Weight:       Height:            normal...

## 2017-12-11 ENCOUNTER — APPOINTMENT (OUTPATIENT)
Dept: NEUROSURGERY | Facility: CLINIC | Age: 57
End: 2017-12-11

## 2018-01-01 ENCOUNTER — OUTPATIENT (OUTPATIENT)
Dept: OUTPATIENT SERVICES | Facility: HOSPITAL | Age: 58
LOS: 1 days | End: 2018-01-01
Payer: MEDICAID

## 2018-01-01 DIAGNOSIS — Z90.13 ACQUIRED ABSENCE OF BILATERAL BREASTS AND NIPPLES: Chronic | ICD-10-CM

## 2018-01-01 DIAGNOSIS — Z98.890 OTHER SPECIFIED POSTPROCEDURAL STATES: Chronic | ICD-10-CM

## 2018-01-02 ENCOUNTER — APPOINTMENT (OUTPATIENT)
Dept: CT IMAGING | Facility: CLINIC | Age: 58
End: 2018-01-02

## 2018-01-02 ENCOUNTER — APPOINTMENT (OUTPATIENT)
Dept: MRI IMAGING | Facility: CLINIC | Age: 58
End: 2018-01-02

## 2018-01-02 ENCOUNTER — APPOINTMENT (OUTPATIENT)
Dept: NEUROSURGERY | Facility: CLINIC | Age: 58
End: 2018-01-02

## 2018-01-11 ENCOUNTER — INPATIENT (INPATIENT)
Facility: HOSPITAL | Age: 58
LOS: 3 days | Discharge: HOME HEALTH | End: 2018-01-15
Attending: HOSPITALIST | Admitting: HOSPITALIST
Payer: MEDICAID

## 2018-01-11 VITALS
RESPIRATION RATE: 18 BRPM | TEMPERATURE: 100 F | SYSTOLIC BLOOD PRESSURE: 103 MMHG | HEART RATE: 104 BPM | OXYGEN SATURATION: 100 % | DIASTOLIC BLOOD PRESSURE: 71 MMHG

## 2018-01-11 DIAGNOSIS — I63.8 OTHER CEREBRAL INFARCTION: ICD-10-CM

## 2018-01-11 DIAGNOSIS — Z90.13 ACQUIRED ABSENCE OF BILATERAL BREASTS AND NIPPLES: Chronic | ICD-10-CM

## 2018-01-11 DIAGNOSIS — C79.31 SECONDARY MALIGNANT NEOPLASM OF BRAIN: ICD-10-CM

## 2018-01-11 DIAGNOSIS — Z98.890 OTHER SPECIFIED POSTPROCEDURAL STATES: Chronic | ICD-10-CM

## 2018-01-11 LAB
ALBUMIN SERPL ELPH-MCNC: 2.9 G/DL — LOW (ref 3.3–5)
ALP SERPL-CCNC: 97 U/L — SIGNIFICANT CHANGE UP (ref 40–120)
ALT FLD-CCNC: 5 U/L — SIGNIFICANT CHANGE UP (ref 4–33)
APTT BLD: 30.9 SEC — SIGNIFICANT CHANGE UP (ref 27.5–37.4)
AST SERPL-CCNC: 10 U/L — SIGNIFICANT CHANGE UP (ref 4–32)
B PERT DNA SPEC QL NAA+PROBE: SIGNIFICANT CHANGE UP
BASE EXCESS BLDV CALC-SCNC: 3.8 MMOL/L — SIGNIFICANT CHANGE UP
BASOPHILS # BLD AUTO: 0.01 K/UL — SIGNIFICANT CHANGE UP (ref 0–0.2)
BASOPHILS NFR BLD AUTO: 0.1 % — SIGNIFICANT CHANGE UP (ref 0–2)
BILIRUB SERPL-MCNC: 0.2 MG/DL — SIGNIFICANT CHANGE UP (ref 0.2–1.2)
BLOOD GAS VENOUS - CREATININE: < 0.36 MG/DL — LOW (ref 0.5–1.3)
BUN SERPL-MCNC: 10 MG/DL — SIGNIFICANT CHANGE UP (ref 7–23)
C PNEUM DNA SPEC QL NAA+PROBE: NOT DETECTED — SIGNIFICANT CHANGE UP
CALCIUM SERPL-MCNC: 8.4 MG/DL — SIGNIFICANT CHANGE UP (ref 8.4–10.5)
CHLORIDE BLDV-SCNC: 104 MMOL/L — SIGNIFICANT CHANGE UP (ref 96–108)
CHLORIDE SERPL-SCNC: 98 MMOL/L — SIGNIFICANT CHANGE UP (ref 98–107)
CK MB BLD-MCNC: 1 NG/ML — SIGNIFICANT CHANGE UP (ref 1–4.7)
CK SERPL-CCNC: 52 U/L — SIGNIFICANT CHANGE UP (ref 25–170)
CO2 SERPL-SCNC: 26 MMOL/L — SIGNIFICANT CHANGE UP (ref 22–31)
CREAT SERPL-MCNC: 0.36 MG/DL — LOW (ref 0.5–1.3)
EOSINOPHIL # BLD AUTO: 0 K/UL — SIGNIFICANT CHANGE UP (ref 0–0.5)
EOSINOPHIL NFR BLD AUTO: 0 % — SIGNIFICANT CHANGE UP (ref 0–6)
FLUAV H1 2009 PAND RNA SPEC QL NAA+PROBE: NOT DETECTED — SIGNIFICANT CHANGE UP
FLUAV H1 RNA SPEC QL NAA+PROBE: NOT DETECTED — SIGNIFICANT CHANGE UP
FLUAV H3 RNA SPEC QL NAA+PROBE: NOT DETECTED — SIGNIFICANT CHANGE UP
FLUAV SUBTYP SPEC NAA+PROBE: SIGNIFICANT CHANGE UP
FLUBV RNA SPEC QL NAA+PROBE: NOT DETECTED — SIGNIFICANT CHANGE UP
GAS PNL BLDV: 132 MMOL/L — LOW (ref 136–146)
GLUCOSE BLDV-MCNC: 115 — HIGH (ref 70–99)
GLUCOSE SERPL-MCNC: 113 MG/DL — HIGH (ref 70–99)
HADV DNA SPEC QL NAA+PROBE: NOT DETECTED — SIGNIFICANT CHANGE UP
HCO3 BLDV-SCNC: 26 MMOL/L — SIGNIFICANT CHANGE UP (ref 20–27)
HCOV 229E RNA SPEC QL NAA+PROBE: NOT DETECTED — SIGNIFICANT CHANGE UP
HCOV HKU1 RNA SPEC QL NAA+PROBE: NOT DETECTED — SIGNIFICANT CHANGE UP
HCOV NL63 RNA SPEC QL NAA+PROBE: NOT DETECTED — SIGNIFICANT CHANGE UP
HCOV OC43 RNA SPEC QL NAA+PROBE: NOT DETECTED — SIGNIFICANT CHANGE UP
HCT VFR BLD CALC: 32.5 % — LOW (ref 34.5–45)
HCT VFR BLDV CALC: 45.6 % — HIGH (ref 34.5–45)
HGB BLD-MCNC: 10.2 G/DL — LOW (ref 11.5–15.5)
HGB BLDV-MCNC: 14.9 G/DL — SIGNIFICANT CHANGE UP (ref 11.5–15.5)
HMPV RNA SPEC QL NAA+PROBE: NOT DETECTED — SIGNIFICANT CHANGE UP
HPIV1 RNA SPEC QL NAA+PROBE: NOT DETECTED — SIGNIFICANT CHANGE UP
HPIV2 RNA SPEC QL NAA+PROBE: NOT DETECTED — SIGNIFICANT CHANGE UP
HPIV3 RNA SPEC QL NAA+PROBE: NOT DETECTED — SIGNIFICANT CHANGE UP
HPIV4 RNA SPEC QL NAA+PROBE: NOT DETECTED — SIGNIFICANT CHANGE UP
IMM GRANULOCYTES # BLD AUTO: 0.06 # — SIGNIFICANT CHANGE UP
IMM GRANULOCYTES NFR BLD AUTO: 0.5 % — SIGNIFICANT CHANGE UP (ref 0–1.5)
INR BLD: 1.04 — SIGNIFICANT CHANGE UP (ref 0.88–1.17)
LACTATE BLDV-MCNC: 1.5 MMOL/L — SIGNIFICANT CHANGE UP (ref 0.5–2)
LYMPHOCYTES # BLD AUTO: 0.78 K/UL — LOW (ref 1–3.3)
LYMPHOCYTES # BLD AUTO: 7.1 % — LOW (ref 13–44)
M PNEUMO DNA SPEC QL NAA+PROBE: NOT DETECTED — SIGNIFICANT CHANGE UP
MCHC RBC-ENTMCNC: 27.1 PG — SIGNIFICANT CHANGE UP (ref 27–34)
MCHC RBC-ENTMCNC: 31.4 % — LOW (ref 32–36)
MCV RBC AUTO: 86.4 FL — SIGNIFICANT CHANGE UP (ref 80–100)
MONOCYTES # BLD AUTO: 0.6 K/UL — SIGNIFICANT CHANGE UP (ref 0–0.9)
MONOCYTES NFR BLD AUTO: 5.5 % — SIGNIFICANT CHANGE UP (ref 2–14)
NEUTROPHILS # BLD AUTO: 9.49 K/UL — HIGH (ref 1.8–7.4)
NEUTROPHILS NFR BLD AUTO: 86.8 % — HIGH (ref 43–77)
NRBC # FLD: 0 — SIGNIFICANT CHANGE UP
PCO2 BLDV: 47 MMHG — SIGNIFICANT CHANGE UP (ref 41–51)
PH BLDV: 7.4 PH — SIGNIFICANT CHANGE UP (ref 7.32–7.43)
PLATELET # BLD AUTO: 483 K/UL — HIGH (ref 150–400)
PMV BLD: 9.4 FL — SIGNIFICANT CHANGE UP (ref 7–13)
PO2 BLDV: 28 MMHG — LOW (ref 35–40)
POTASSIUM BLDV-SCNC: 3.6 MMOL/L — SIGNIFICANT CHANGE UP (ref 3.4–4.5)
POTASSIUM SERPL-MCNC: 3.9 MMOL/L — SIGNIFICANT CHANGE UP (ref 3.5–5.3)
POTASSIUM SERPL-SCNC: 3.9 MMOL/L — SIGNIFICANT CHANGE UP (ref 3.5–5.3)
PROT SERPL-MCNC: 6.6 G/DL — SIGNIFICANT CHANGE UP (ref 6–8.3)
PROTHROM AB SERPL-ACNC: 12 SEC — SIGNIFICANT CHANGE UP (ref 9.8–13.1)
RBC # BLD: 3.76 M/UL — LOW (ref 3.8–5.2)
RBC # FLD: 18.9 % — HIGH (ref 10.3–14.5)
RSV RNA SPEC QL NAA+PROBE: NOT DETECTED — SIGNIFICANT CHANGE UP
RV+EV RNA SPEC QL NAA+PROBE: NOT DETECTED — SIGNIFICANT CHANGE UP
SAO2 % BLDV: 40.7 % — LOW (ref 60–85)
SODIUM SERPL-SCNC: 136 MMOL/L — SIGNIFICANT CHANGE UP (ref 135–145)
TROPONIN T SERPL-MCNC: < 0.06 NG/ML — SIGNIFICANT CHANGE UP (ref 0–0.06)
WBC # BLD: 10.94 K/UL — HIGH (ref 3.8–10.5)
WBC # FLD AUTO: 10.94 K/UL — HIGH (ref 3.8–10.5)

## 2018-01-11 PROCEDURE — 71045 X-RAY EXAM CHEST 1 VIEW: CPT | Mod: 26

## 2018-01-11 PROCEDURE — 70450 CT HEAD/BRAIN W/O DYE: CPT | Mod: 26

## 2018-01-11 PROCEDURE — 72129 CT CHEST SPINE W/DYE: CPT | Mod: 26

## 2018-01-11 PROCEDURE — 71275 CT ANGIOGRAPHY CHEST: CPT | Mod: 26

## 2018-01-11 PROCEDURE — 74177 CT ABD & PELVIS W/CONTRAST: CPT | Mod: 26

## 2018-01-11 PROCEDURE — 72132 CT LUMBAR SPINE W/DYE: CPT | Mod: 26

## 2018-01-11 RX ORDER — KETAMINE HYDROCHLORIDE 100 MG/ML
16 INJECTION INTRAMUSCULAR; INTRAVENOUS ONCE
Qty: 0 | Refills: 0 | Status: DISCONTINUED | OUTPATIENT
Start: 2018-01-11 | End: 2018-01-11

## 2018-01-11 RX ORDER — DEXAMETHASONE 0.5 MG/5ML
4 ELIXIR ORAL EVERY 6 HOURS
Qty: 0 | Refills: 0 | Status: DISCONTINUED | OUTPATIENT
Start: 2018-01-11 | End: 2018-01-14

## 2018-01-11 RX ORDER — OXYCODONE HYDROCHLORIDE 5 MG/1
5 TABLET ORAL ONCE
Qty: 0 | Refills: 0 | Status: DISCONTINUED | OUTPATIENT
Start: 2018-01-11 | End: 2018-01-11

## 2018-01-11 RX ORDER — ACETAMINOPHEN 500 MG
1000 TABLET ORAL ONCE
Qty: 0 | Refills: 0 | Status: COMPLETED | OUTPATIENT
Start: 2018-01-11 | End: 2018-01-11

## 2018-01-11 RX ADMIN — KETAMINE HYDROCHLORIDE 16 MILLIGRAM(S): 100 INJECTION INTRAMUSCULAR; INTRAVENOUS at 15:57

## 2018-01-11 RX ADMIN — Medication 1000 MILLIGRAM(S): at 14:06

## 2018-01-11 RX ADMIN — Medication 400 MILLIGRAM(S): at 13:36

## 2018-01-11 RX ADMIN — Medication 4 MILLIGRAM(S): at 21:14

## 2018-01-11 RX ADMIN — OXYCODONE HYDROCHLORIDE 5 MILLIGRAM(S): 5 TABLET ORAL at 18:30

## 2018-01-11 RX ADMIN — OXYCODONE HYDROCHLORIDE 5 MILLIGRAM(S): 5 TABLET ORAL at 19:54

## 2018-01-11 NOTE — CONSULT NOTE ADULT - PROBLEM SELECTOR RECOMMENDATION 9
Admit to medicine  Start dexamethasone 4Mg/Q6H  Brain MRI with contrast  Radiation oncology consult for whole brain RT

## 2018-01-11 NOTE — ED PROVIDER NOTE - MEDICAL DECISION MAKING DETAILS
57F with h/o breast ca, mets to brain status post multiple craniotomies presents with worsening headache, abdominal pain, cough, chest pain, shortness of breath.  Concern for metastatic progression vs. CVA vs. PE vs. obstruction vs. flu.  Will get labs, ct head, chest, abdomen, t&L, c-spine.  Neur cs, nsgy cs.

## 2018-01-11 NOTE — ED PROVIDER NOTE - PROGRESS NOTE DETAILS
Consulted Neuro and NSGY Pt was seen by Neurosurgery, recommended admission for steroid and further imaging. Pt has been admitted to medicine.

## 2018-01-11 NOTE — CONSULT NOTE ADULT - CONSULT REASON
Headaches, right occipital soft tissue mass, hx breast Ca with brain metastases, s/p multiple craniotomies

## 2018-01-11 NOTE — CONSULT NOTE ADULT - SUBJECTIVE AND OBJECTIVE BOX
Neurology Consult    Reason for consult: Headache and CT head finding    HPI: Patient is a 57 year old female presenting to the ED with headache. Patient has PMH of breast cancer (mets to brain s/p gamma knife x2 2016 and craniotomy 10/17), L brachial neuritis. Patient states she has this persistent pain at site of craniotomy where a bump developed. Denies any numbness, tingling, weakness, changes in vision. Is no longer on keppra or decadron.    REVIEW OF SYSTEMS:  Constitutional: No fever, chills, fatigue, weakness.  Eyes: No eye pain, visual disturbances, or discharge.  ENT:  No difficulty hearing, tinnitus, vertigo. No sinus or throat pain.  Neck: No pain or stiffness.  Respiratory: No cough, dyspnea, wheezing.  Cardiovascular: No chest pain, palpitations.  Gastrointestinal: No abdominal pain. No nausea, vomiting, diarrhea, or constipation.   Genitourinary: No dysuria, frequency, hematuria or incontinence.  Neurological: Headaches. No lightheadedness, vertigo, numbness or tremors.  Psychiatric: No depression, anxiety, mood swings, or difficulty sleeping.  Musculoskeletal: No joint pain or swelling. No muscle, back, or extremity pain.  Skin: No itching, burning, rashes or lesions.   Lymph Nodes: No enlarged glands  Endocrine: No heat or cold intolerance, no hair loss.  Allergy and Immunologic: No hives or eczema.    MEDICATIONS      PMH: Brachial neuritis  Breast cancer  Breast cancer    PSH: H/O brain surgery  H/O bilateral mastectomy  No significant past surgical history  H/O bilateral mastectomy    FAMILY HISTORY:  No pertinent family history in first degree relatives  No history of dementia    SOCIAL HISTORY:  No history of tobacco or alcohol use     Allergies  No Known Allergies    Vital Signs Last 24 Hrs  T(C): 37.1 (11 Jan 2018 14:48), Max: 37.6 (11 Jan 2018 11:35)  T(F): 98.8 (11 Jan 2018 14:48), Max: 99.6 (11 Jan 2018 11:35)  HR: 105 (11 Jan 2018 14:48) (103 - 105)  BP: 111/68 (11 Jan 2018 14:48) (103/71 - 111/68)  RR: 19 (11 Jan 2018 14:48) (18 - 19)  SpO2: 100% (11 Jan 2018 14:48) (100% - 100%)    Neurological Examination:    Mental Status: Patient is alert, awake, oriented X3. Patient is fluent, no dysarthria, no aphasia. Follows commands well and able to answer questions appropriately. Mood and affect normal.    Cranial Nerves: PERRL, EOMI, visual field intact, V1-V3 intact, no gross facial asymmetry, tongue/uvula midline    Motor Exam: No drift  Right upper extremity: 5/5  Left upper extremity: contracted hand  Right lower extremity: 5/5  Left lower extremity: 5/5    Normal bulk/tone    Sensory: Intact to light touch bilaterally. No extinction    Coordination: Finger to nose intact bilaterally     Plantar: Up bilateral    GENERAL: No acute distress  HEENT:  Cystic structure in right parietal area, atraumatic  EXTREMITIES: No edema, clubbing, cyanosis  MUSCULOSKELETAL: Normal range of motion  SKIN: No rashes    LABS:  CBC Full  -  ( 11 Jan 2018 12:50 )  WBC Count : 10.94 K/uL  Hemoglobin : 10.2 g/dL  Hematocrit : 32.5 %  Platelet Count - Automated : 483 K/uL  Mean Cell Volume : 86.4 fL  Mean Cell Hemoglobin : 27.1 pg  Mean Cell Hemoglobin Concentration : 31.4 %  Auto Neutrophil # : 9.49 K/uL  Auto Lymphocyte # : 0.78 K/uL  Auto Monocyte # : 0.60 K/uL  Auto Eosinophil # : 0.00 K/uL  Auto Basophil # : 0.01 K/uL  Auto Neutrophil % : 86.8 %  Auto Lymphocyte % : 7.1 %  Auto Monocyte % : 5.5 %  Auto Eosinophil % : 0.0 %  Auto Basophil % : 0.1 %    01-11    136  |  98  |  10  ----------------------------<  113<H>  3.9   |  26  |  0.36<L>    Ca    8.4      11 Jan 2018 12:50    TPro  6.6  /  Alb  2.9<L>  /  TBili  0.2  /  DBili  x   /  AST  10  /  ALT  5   /  AlkPhos  97  01-11    LIVER FUNCTIONS - ( 11 Jan 2018 12:50 )  Alb: 2.9 g/dL / Pro: 6.6 g/dL / ALK PHOS: 97 u/L / ALT: 5 u/L / AST: 10 u/L / GGT: x           PT/INR - ( 11 Jan 2018 12:50 )   PT: 12.0 SEC;   INR: 1.04       PTT - ( 11 Jan 2018 12:50 )  PTT:30.9 SEC

## 2018-01-11 NOTE — ED PROVIDER NOTE - OBJECTIVE STATEMENT
58 YO female with history of breast Ca, brain metastases, s/p multiple craniotomies for resection, s/p GKRT presents to ED 56 YO female with history of breast Ca, brain metastases, s/p multiple craniotomies for resection, s/p GKRT presents to ED with headache x 1 week, keeps her up at night.  Headache is localized to cranial irregularity on parietal scalp.  Feels like sharp, knife grinding.  Taking tylenol which initially helped pain but no longer does.  3 weeks ago, developed itchiness in throat with cough productive of clear sputum.  A/w shortness of breath and chest pain when coughing.  Also complaining of 1 week h/o LBP described as 10/10.  Also developed diffuse 10/10 abdominal cramping since taking 1 dose of oxycodone 3d ago for headache.  Abd pain worse with eating.  Last bowel movement was this am and was normal. 56 YO female with history of breast Ca, brain metastases, s/p multiple craniotomies for resection, s/p GKRT presents to ED with headache x 1 week, keeps her up at night.  Headache is localized to cranial irregularity from craniotomy on R parietal scalp.  Feels like sharp, knife grinding.  Taking tylenol which initially helped pain but no longer does.  3 weeks ago, developed itchiness in throat with cough productive of clear sputum.  A/w shortness of breath and chest pain when coughing.  Also complaining of 1 week h/o LBP described as 10/10, sharp, constant, non-radiating.  Also developed diffuse 10/10 abdominal cramping since taking 1 dose of oxycodone 3d ago for headache.  Abd pain worse with eating.  Last bowel movement was this am and was normal.  Denies fever, chills, n/v, diarrhea, constipation, dysuria.

## 2018-01-11 NOTE — ED ADULT NURSE NOTE - OBJECTIVE STATEMENT
56 y/o female pt, rcvd in room 20, accompanied by dtr, c/o pain "all over her body" - head, chest, lower back, abdomen x 3 days, also with productive cough with whitish sputum, gets chest pain and nausea and SOB when coughing. Pt denies fevers, chills. MD velazco done, 20G placed on R AC, labs sent, VS as noted, NAD. Pt double mastectomy-dtr reports R arm may be used for IV placement.

## 2018-01-11 NOTE — CONSULT NOTE ADULT - ATTENDING COMMENTS
Ms. Deluca is a 57 year old female presenting to the ED with headache. Patient has PMH of breast cancer (mets to brain s/p gamma knife x2 2016 and craniotomy 10/17), L brachial neuritis. She has left sided arm weakness which is chronic from brachial neuritis. She has pain on the lump on her head which has rapidly grown from the site of prior craniotomy to resect the mass. On examination, she has a dense left visual field cut. She has weakness in her left arm with a benediction sign in her left hand. She has slight left leg weakness as well. Her right arm and leg are 5/5, and the rest of her CNs are normal. Reflexes are symmetric. MRI shows multiple new metastases throughout the brain, personally reviewed and reviewed with radiology.    A/P: Lump on head is possible a met per radiology. It is in the ddx. She has brain metastases throughout the brain, agree with plan per neurosurgery. No further recommendations, please call with questions.

## 2018-01-11 NOTE — CONSULT NOTE ADULT - SUBJECTIVE AND OBJECTIVE BOX
58 YO female with history of breast Ca, brain metastases, s/p multiple craniotomies for resection, s/p GKRT presents to ED with headache x 1 week, keeps her up at night.  Headache is localized to soft tissue mass at craniotomy site on R parietal scalp.     WDWN female in NAD  Vital Signs Last 24 Hrs  T(C): 37.1 (11 Jan 2018 14:48), Max: 37.6 (11 Jan 2018 11:35)  T(F): 98.8 (11 Jan 2018 14:48), Max: 99.6 (11 Jan 2018 11:35)  HR: 102 (11 Jan 2018 18:34) (102 - 109)  BP: 105/75 (11 Jan 2018 18:34) (103/71 - 111/68)  BP(mean): --  RR: 22 (11 Jan 2018 18:34) (18 - 22)  SpO2: 99% (11 Jan 2018 18:34) (99% - 100%)    Neuro: AAO X 3  PERRLA, EOMI  Left hand contracted, otherwise HSU with good strength    HEENT: 2CM firm, mobile, mildly tender soft tissue mass right occipital scalp over craniotomy site      Brain CT: New low-attenuation in the left frontal lobe. This finding corresponds to a   small enhancing lesion seen on the prior contrast-enhanced MRI. New   well-circumscribed lesion in the medial right temporal lobe measuring 1.5 x   1.5 x 1.5 cm. This was not present on prior exam performed 10/11/2017. Given   the patient's history, this likely represents neoplasm rather than   infarction.     Right parietal occipital temporal resection with right parietal occipital   craniotomy is reidentified. There is vasogenic edema with 0.4 cm midline   shift (12:14) which has decreased from 0.7 cm on the prior exam.

## 2018-01-11 NOTE — ED PROVIDER NOTE - ATTENDING CONTRIBUTION TO CARE
I performed a face to face bedside interview with patient regarding history of present illness, review of symptoms and past medical history. I completed an independent physical exam.  I have discussed patient's plan of care.   I agree with note as stated above, having amended the EMR as needed to reflect my findings. I have discussed the assessment and plan of care.  This includes during the time I functioned as the attending physician for this patient.  Attending Contribution to Care: agree with plan of resident. pt p/w severe pain, lower lumbar pain. headache, cp. pt requiring pain meds, pan scan for recon of vertebrae. pain managed. requiring admission for pain management. I performed a face to face bedside interview with patient regarding history of present illness, review of symptoms and past medical history. I completed an independent physical exam.  I have discussed patient's plan of care.   I agree with note as stated above, having amended the EMR as needed to reflect my findings. I have discussed the assessment and plan of care.  This includes during the time I functioned as the attending physician for this patient.  Attending Contribution to Care: agree with plan of resident. pt p/w severe pain, lower lumbar pain. headache, cp. pt requiring pain meds, pan scan for recon of vertebrae. pain managed. requiring admission for pain management. pt p/w stroke on ct. currently with no neurological symptoms. admission as per neuro

## 2018-01-11 NOTE — ED PROVIDER NOTE - NEUROLOGICAL, MLM
Alert and oriented, no focal deficits, no motor or sensory deficits. Alert and oriented, no focal deficits, sensory deficits.  Weakness in L UE.

## 2018-01-11 NOTE — ED ADULT TRIAGE NOTE - CHIEF COMPLAINT QUOTE
Pt c/o n/v and worsening chronic back, chest, abdominal and head pain X2days. Also c/o cold symptoms with productive cough. PMH breast Ca with mets to bone, lungs, lymph nodes, brain

## 2018-01-12 DIAGNOSIS — C50.919 MALIGNANT NEOPLASM OF UNSPECIFIED SITE OF UNSPECIFIED FEMALE BREAST: ICD-10-CM

## 2018-01-12 DIAGNOSIS — D47.3 ESSENTIAL (HEMORRHAGIC) THROMBOCYTHEMIA: ICD-10-CM

## 2018-01-12 DIAGNOSIS — Z29.9 ENCOUNTER FOR PROPHYLACTIC MEASURES, UNSPECIFIED: ICD-10-CM

## 2018-01-12 LAB
ALBUMIN SERPL ELPH-MCNC: 2.7 G/DL — LOW (ref 3.3–5)
ALP SERPL-CCNC: 92 U/L — SIGNIFICANT CHANGE UP (ref 40–120)
ALT FLD-CCNC: 5 U/L — SIGNIFICANT CHANGE UP (ref 4–33)
AST SERPL-CCNC: 7 U/L — SIGNIFICANT CHANGE UP (ref 4–32)
BASOPHILS # BLD AUTO: 0.01 K/UL — SIGNIFICANT CHANGE UP (ref 0–0.2)
BASOPHILS NFR BLD AUTO: 0.1 % — SIGNIFICANT CHANGE UP (ref 0–2)
BILIRUB SERPL-MCNC: 0.2 MG/DL — SIGNIFICANT CHANGE UP (ref 0.2–1.2)
BUN SERPL-MCNC: 7 MG/DL — SIGNIFICANT CHANGE UP (ref 7–23)
CALCIUM SERPL-MCNC: 8.1 MG/DL — LOW (ref 8.4–10.5)
CHLORIDE SERPL-SCNC: 97 MMOL/L — LOW (ref 98–107)
CO2 SERPL-SCNC: 23 MMOL/L — SIGNIFICANT CHANGE UP (ref 22–31)
CREAT SERPL-MCNC: 0.33 MG/DL — LOW (ref 0.5–1.3)
EOSINOPHIL # BLD AUTO: 0 K/UL — SIGNIFICANT CHANGE UP (ref 0–0.5)
EOSINOPHIL NFR BLD AUTO: 0 % — SIGNIFICANT CHANGE UP (ref 0–6)
GLUCOSE SERPL-MCNC: 133 MG/DL — HIGH (ref 70–99)
HCT VFR BLD CALC: 31.1 % — LOW (ref 34.5–45)
HGB BLD-MCNC: 9.8 G/DL — LOW (ref 11.5–15.5)
IMM GRANULOCYTES # BLD AUTO: 0.08 # — SIGNIFICANT CHANGE UP
IMM GRANULOCYTES NFR BLD AUTO: 0.6 % — SIGNIFICANT CHANGE UP (ref 0–1.5)
LYMPHOCYTES # BLD AUTO: 0.91 K/UL — LOW (ref 1–3.3)
LYMPHOCYTES # BLD AUTO: 7.4 % — LOW (ref 13–44)
MAGNESIUM SERPL-MCNC: 2.1 MG/DL — SIGNIFICANT CHANGE UP (ref 1.6–2.6)
MCHC RBC-ENTMCNC: 26.9 PG — LOW (ref 27–34)
MCHC RBC-ENTMCNC: 31.5 % — LOW (ref 32–36)
MCV RBC AUTO: 85.4 FL — SIGNIFICANT CHANGE UP (ref 80–100)
MONOCYTES # BLD AUTO: 0.13 K/UL — SIGNIFICANT CHANGE UP (ref 0–0.9)
MONOCYTES NFR BLD AUTO: 1.1 % — LOW (ref 2–14)
NEUTROPHILS # BLD AUTO: 11.21 K/UL — HIGH (ref 1.8–7.4)
NEUTROPHILS NFR BLD AUTO: 90.8 % — HIGH (ref 43–77)
NRBC # FLD: 0 — SIGNIFICANT CHANGE UP
PHOSPHATE SERPL-MCNC: 2.5 MG/DL — SIGNIFICANT CHANGE UP (ref 2.5–4.5)
PLATELET # BLD AUTO: 459 K/UL — HIGH (ref 150–400)
PMV BLD: 9.4 FL — SIGNIFICANT CHANGE UP (ref 7–13)
POTASSIUM SERPL-MCNC: 4.7 MMOL/L — SIGNIFICANT CHANGE UP (ref 3.5–5.3)
POTASSIUM SERPL-SCNC: 4.7 MMOL/L — SIGNIFICANT CHANGE UP (ref 3.5–5.3)
PROT SERPL-MCNC: 6.3 G/DL — SIGNIFICANT CHANGE UP (ref 6–8.3)
RBC # BLD: 3.64 M/UL — LOW (ref 3.8–5.2)
RBC # FLD: 19.5 % — HIGH (ref 10.3–14.5)
SODIUM SERPL-SCNC: 135 MMOL/L — SIGNIFICANT CHANGE UP (ref 135–145)
WBC # BLD: 12.34 K/UL — HIGH (ref 3.8–10.5)
WBC # FLD AUTO: 12.34 K/UL — HIGH (ref 3.8–10.5)

## 2018-01-12 PROCEDURE — 12345: CPT | Mod: NC

## 2018-01-12 PROCEDURE — 99232 SBSQ HOSP IP/OBS MODERATE 35: CPT | Mod: GC

## 2018-01-12 PROCEDURE — 70553 MRI BRAIN STEM W/O & W/DYE: CPT | Mod: 26

## 2018-01-12 PROCEDURE — 99223 1ST HOSP IP/OBS HIGH 75: CPT | Mod: GC

## 2018-01-12 RX ORDER — MORPHINE SULFATE 50 MG/1
2 CAPSULE, EXTENDED RELEASE ORAL EVERY 4 HOURS
Qty: 0 | Refills: 0 | Status: DISCONTINUED | OUTPATIENT
Start: 2018-01-12 | End: 2018-01-13

## 2018-01-12 RX ORDER — OXYCODONE HYDROCHLORIDE 5 MG/1
5 TABLET ORAL EVERY 4 HOURS
Qty: 0 | Refills: 0 | Status: DISCONTINUED | OUTPATIENT
Start: 2018-01-12 | End: 2018-01-12

## 2018-01-12 RX ORDER — DOCUSATE SODIUM 100 MG
100 CAPSULE ORAL THREE TIMES A DAY
Qty: 0 | Refills: 0 | Status: DISCONTINUED | OUTPATIENT
Start: 2018-01-12 | End: 2018-01-15

## 2018-01-12 RX ORDER — CHOLECALCIFEROL (VITAMIN D3) 125 MCG
1 CAPSULE ORAL
Qty: 0 | Refills: 0 | COMMUNITY

## 2018-01-12 RX ORDER — MORPHINE SULFATE 50 MG/1
4 CAPSULE, EXTENDED RELEASE ORAL EVERY 6 HOURS
Qty: 0 | Refills: 0 | Status: DISCONTINUED | OUTPATIENT
Start: 2018-01-12 | End: 2018-01-12

## 2018-01-12 RX ORDER — MORPHINE SULFATE 50 MG/1
2 CAPSULE, EXTENDED RELEASE ORAL ONCE
Qty: 0 | Refills: 0 | Status: DISCONTINUED | OUTPATIENT
Start: 2018-01-12 | End: 2018-01-12

## 2018-01-12 RX ORDER — OXYCODONE HYDROCHLORIDE 5 MG/1
5 TABLET ORAL EVERY 4 HOURS
Qty: 0 | Refills: 0 | Status: DISCONTINUED | OUTPATIENT
Start: 2018-01-12 | End: 2018-01-13

## 2018-01-12 RX ORDER — POLYETHYLENE GLYCOL 3350 17 G/17G
17 POWDER, FOR SOLUTION ORAL DAILY
Qty: 0 | Refills: 0 | Status: DISCONTINUED | OUTPATIENT
Start: 2018-01-12 | End: 2018-01-15

## 2018-01-12 RX ORDER — MORPHINE SULFATE 50 MG/1
4 CAPSULE, EXTENDED RELEASE ORAL ONCE
Qty: 0 | Refills: 0 | Status: DISCONTINUED | OUTPATIENT
Start: 2018-01-12 | End: 2018-01-12

## 2018-01-12 RX ORDER — PANTOPRAZOLE SODIUM 20 MG/1
40 TABLET, DELAYED RELEASE ORAL
Qty: 0 | Refills: 0 | Status: DISCONTINUED | OUTPATIENT
Start: 2018-01-12 | End: 2018-01-15

## 2018-01-12 RX ORDER — ACETAMINOPHEN 500 MG
650 TABLET ORAL EVERY 6 HOURS
Qty: 0 | Refills: 0 | Status: DISCONTINUED | OUTPATIENT
Start: 2018-01-12 | End: 2018-01-13

## 2018-01-12 RX ORDER — SENNA PLUS 8.6 MG/1
2 TABLET ORAL AT BEDTIME
Qty: 0 | Refills: 0 | Status: DISCONTINUED | OUTPATIENT
Start: 2018-01-12 | End: 2018-01-15

## 2018-01-12 RX ORDER — OXYCODONE HYDROCHLORIDE 5 MG/1
5 TABLET ORAL ONCE
Qty: 0 | Refills: 0 | Status: DISCONTINUED | OUTPATIENT
Start: 2018-01-12 | End: 2018-01-12

## 2018-01-12 RX ADMIN — OXYCODONE HYDROCHLORIDE 5 MILLIGRAM(S): 5 TABLET ORAL at 02:30

## 2018-01-12 RX ADMIN — Medication 200 MILLIGRAM(S): at 09:02

## 2018-01-12 RX ADMIN — OXYCODONE HYDROCHLORIDE 5 MILLIGRAM(S): 5 TABLET ORAL at 06:40

## 2018-01-12 RX ADMIN — Medication 4 MILLIGRAM(S): at 23:07

## 2018-01-12 RX ADMIN — Medication 4 MILLIGRAM(S): at 18:52

## 2018-01-12 RX ADMIN — Medication 200 MILLIGRAM(S): at 01:47

## 2018-01-12 RX ADMIN — MORPHINE SULFATE 4 MILLIGRAM(S): 50 CAPSULE, EXTENDED RELEASE ORAL at 13:34

## 2018-01-12 RX ADMIN — OXYCODONE HYDROCHLORIDE 5 MILLIGRAM(S): 5 TABLET ORAL at 11:00

## 2018-01-12 RX ADMIN — Medication 100 MILLIGRAM(S): at 22:04

## 2018-01-12 RX ADMIN — Medication 100 MILLIGRAM(S): at 13:34

## 2018-01-12 RX ADMIN — Medication 100 MILLIGRAM(S): at 05:42

## 2018-01-12 RX ADMIN — Medication 4 MILLIGRAM(S): at 05:42

## 2018-01-12 RX ADMIN — Medication 200 MILLIGRAM(S): at 22:29

## 2018-01-12 RX ADMIN — OXYCODONE HYDROCHLORIDE 5 MILLIGRAM(S): 5 TABLET ORAL at 11:50

## 2018-01-12 RX ADMIN — OXYCODONE HYDROCHLORIDE 5 MILLIGRAM(S): 5 TABLET ORAL at 22:04

## 2018-01-12 RX ADMIN — OXYCODONE HYDROCHLORIDE 5 MILLIGRAM(S): 5 TABLET ORAL at 05:42

## 2018-01-12 RX ADMIN — POLYETHYLENE GLYCOL 3350 17 GRAM(S): 17 POWDER, FOR SOLUTION ORAL at 19:16

## 2018-01-12 RX ADMIN — OXYCODONE HYDROCHLORIDE 5 MILLIGRAM(S): 5 TABLET ORAL at 01:46

## 2018-01-12 RX ADMIN — MORPHINE SULFATE 4 MILLIGRAM(S): 50 CAPSULE, EXTENDED RELEASE ORAL at 13:49

## 2018-01-12 RX ADMIN — Medication 4 MILLIGRAM(S): at 01:04

## 2018-01-12 RX ADMIN — Medication 4 MILLIGRAM(S): at 11:00

## 2018-01-12 RX ADMIN — SENNA PLUS 2 TABLET(S): 8.6 TABLET ORAL at 22:04

## 2018-01-12 RX ADMIN — OXYCODONE HYDROCHLORIDE 5 MILLIGRAM(S): 5 TABLET ORAL at 23:01

## 2018-01-12 NOTE — PROGRESS NOTE ADULT - PROBLEM SELECTOR PLAN 2
- Widely metastatic as above, and also with enlargement of right lung mass with invasion of hilum as well as right adrenal metastasis on today's imaging. Though pt appears to have good performance status  - Will notify patient's Oncologist (Dr. William Byrd) of her admission in the AM

## 2018-01-12 NOTE — PROGRESS NOTE ADULT - ASSESSMENT
57F with breast cancer w/ mets to bone, lung, brain, and lymph nodes (Dx 3/2014) s/p b/l mastectomy w/ LN dissection, chemotherapy (last in 3/2017), radiation therapy (gamma knife x 2), and 5 craniotomies (last in 10/2017 - Rt occipital) who presents with worsening headache, found to have new brain metastases in the left frontal lobe and right temporal lobe.

## 2018-01-12 NOTE — CONSULT NOTE ADULT - ASSESSMENT
56 YO female with breast Ca, multiple brain metastases, new soft tissue scalp mass
57 year old female presenting to the ED with headache. Patient has PMH of breast cancer (mets to brain s/p gamma knife x2 2016 and craniotomy 10/17), L brachial neuritis. Patient states she has this persistent pain at site of craniotomy where a bump developed. Denies any numbness, tingling, weakness, changes in vision. Is no longer on keppra or decadron.  On neuro exam, patient has chronic contracted left hand. Bilateral babinski. Cystic mass right parietal area tender to palpation.  CT head on my review with new low attenuation in left frontal lobe    Recommend:  Neurosurgery evaluation of cystic mass on right parietal area  MRI brain with and without contrast
Resident note appreciated.    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  Her current burden of disease would make her not amenable to focal treatment.      My recommendation was to proceed with whole brain radiation therapy.    Risks, benefits, and alternatives were discussed in detail.  Informed consent was signed.

## 2018-01-12 NOTE — CONSULT NOTE ADULT - SUBJECTIVE AND OBJECTIVE BOX
HPI:  Patient is a 56 y/o F w/ a PMHx of breast cancer w/ mets to bone, lung, brain, and lymph nodes (Dx 3/2014) s/p b/l mastectomy w/ LN dissection, chemotherapy (last in 3/2017), radiation therapy, and 5 craniotomies (last in 10/2017 - Rt occipital) who presents to the ED with worsening headache over the past week. Patient was in her usual state of health until 1 week ago when she started to develop a sharp headache. Pain is described as "sharp" and it is located primarily in her right occipital area. Pain initially waxed and waned, but it eventually became constant, making it difficult for patient to sleep at night. Patient's pain is located near the site of her prior craniotomy. She explains that a few weeks after the procedure, she developed a bump near the incision site which has slowly increased in size over the past month. Patient's pain initially resolved with Tylenol, but this eventually became ineffective. Since patient's pain was unrelenting, she came to the ED for further evaluation. On ROS, patient endorses some chest pain when she coughs and occasional back pain, but denies any recent fever, chills, dizziness/lightheadedness, vision changes, nausea, vomiting, diarrhea, abdominal pain, or dysuria. No recent falls.     In the ED, pt's VS were: T = 99.4F, HR = 104, BP = 103/71, RR = 18, O2 Sat = 100% on RA. Labs were significant for thrombocytosis (platelet count = 483), negative Josemanuel x1, a lactate of 1.5, and a negative RVP. Patient was given IV Tylenol 1g x1, PO Oxycodone IR 5mg x1, and IV Ketamine 16mg x1. Patient was then admitted to Medicine for further management. (12 Jan 2018 01:39)      Allergies    No Known Allergies    Intolerances        ROS: [  ] Fever  [  ] Chills  [  ]Chest Pain [  ] SOB  [  ]Cough [  ] N/V  [  ] Diarrhea [  ]Constipation [  ]Other ROS:  [  ] ROS otherwise negative    PAST MEDICAL & SURGICAL HISTORY:  Brachial neuritis: left  Breast cancer  Breast cancer: left, mets to bone, lung, lymph nodes dx in 2014  mets to brain dx 2017  H/O brain surgery: craniotomy 3/2017  H/O bilateral mastectomy  No significant past surgical history  H/O bilateral mastectomy: 2014      FAMILY HISTORY:  No pertinent family history in first degree relatives      MEDICATIONS  (STANDING):  dexamethasone  Injectable 4 milliGRAM(s) IV Push every 6 hours  docusate sodium 100 milliGRAM(s) Oral three times a day  pantoprazole    Tablet 40 milliGRAM(s) Oral before breakfast  polyethylene glycol 3350 17 Gram(s) Oral daily  senna 2 Tablet(s) Oral at bedtime    MEDICATIONS  (PRN):  acetaminophen   Tablet. 650 milliGRAM(s) Oral every 6 hours PRN Mild and moderate pain  guaiFENesin   Syrup  (Sugar-Free) 200 milliGRAM(s) Oral every 6 hours PRN Cough  morphine  - Injectable 2 milliGRAM(s) IV Push every 4 hours PRN Severe Pain (7 - 10)  oxyCODONE    IR 5 milliGRAM(s) Oral every 4 hours PRN Moderate Pain (4 - 6)      PHYSICAL EXAM  Vital Signs Last 24 Hrs  T(C): 37.1 (13 Jan 2018 05:35), Max: 37.1 (13 Jan 2018 05:35)  T(F): 98.7 (13 Jan 2018 05:35), Max: 98.7 (13 Jan 2018 05:35)  HR: 87 (13 Jan 2018 05:35) (80 - 101)  BP: 115/79 (13 Jan 2018 05:35) (113/77 - 116/86)  BP(mean): --  RR: 18 (13 Jan 2018 05:35) (16 - 18)  SpO2: 98% (13 Jan 2018 05:35) (98% - 99%)    General: Well nourished, well developed, no acute distress  HEENT: NC/AT; EOMI, PERRL, sclera nonicteric; external ears normal; no rhinorrhea or epistaxis; mucous membranes moist; oropharynx clear and without erythema  CV: NR, RR; no appreciable r/m/g  Lungs: CTAB, no increased work of breathing  Abodmen: Bowel sounds present; soft, NTND  MSK: Vertebral spine non-tender to palpation  Neuro: AAOx3; cranial nerves II-XII intact; strength 5/5 in upper and lower extremities; sensation to light touch in tact bilaterally.  Psych: Full affect; mood congruent  Skin: no visible rashes on limited examination    IMAGING/LABS/PATHOLOGY: I have personally reviewed the relevant labs, pathology, and imaging as noted in the HPI.  In addition,      ASSESSMENT/PLAN    LUCY RODRIGUEZ is a 57y woman with     We discussed the use of palliative radiation in this setting, namely to improve quality of life through the reduction of symptoms.  We talked about the risks, benefits, acute and long term side effects, as well as expected treatment outcomes.  He/She was given the opportunity to ask questions, which were answered to his/her apparent satisfaction.  He/She provided written consent to proceed with radiation therapy. We will arrange for inpatient/outpatient treatment. HPI:  Mrs. 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 She presents with headaches secondary to progression of her dural-based disease. In total, she is s/p right occipital craniotomy on 3/15/17 and 10/11/17 and is s/p Gamma Knife SRS on 4/10/17, 6/12/17, and 7/31/17 (receiving 20 Gy x 1 to the right occipital resection cavity, left motor strip, two separate left frontal metastases, and a right parietal metastatic lesion)    Her oncologic history dates back to March of 2014 when she was diagnosed with triple negative breast cancer. She underwent neoadjuvant chemotherapy, bilateral mastectomy, and adjuvant radiation (35 fractions by her report to the [facility] and nodes at [place], Dr. Brown). She currently wears a conformed wrap to the left hand/arm due to brachial neuritis from a prior injection she states. Uses a walker to ambulate longer distances. Her sister is present today with her. CT chest with contrast 2/15/17 showed multiple mediastinal nodes and bilateral pulmonary nodules. On 2/17/17, PET/CT revealed extensive hypermetabolic disease in the right supraclavicular, mediastinal, bilateral hilar, left axillary, bilateral lung, right paraspinal, left ischial and right rib regions. On 3/10/17, core biopsy of the right supraclavicular node was positive for metastatic adenocarcinoma (ER neg, Her 2 neg). Last chemotherapy was Xeloda (3/12/17). She presented to [facility] with headaches x 4 days, 7/10 in intensity, associated with mild blurry vision. She has chronic paresthesias of the feet from chemotherapy but did not develop worsening sensory loss, weakness in her extremities. MRI on 3/14/17 of the brain with contrast revealed a 2.4 cm x 1.9 cm x 2.4 cm enhancing mass in the right occipital region. There was a second mass in the left high frontal region measuring 1 x 0.7 x 0.7 cm. On 3/15/17, she underwent right craniotomy for occipital brain tumor resection with Dr. Estes. Pathology returned as metastatic carcinoma favoring breast origin (ER neg, Her2 neg) although lung or GI primaries could not be excluded. On 3/16/17, post-op MRI of the brain with contrast revealed foci of enhancement (nonspecific) along the resection cavity. THere was also a 1 cm parafalcine lesion and 5 mm focus abutting the left frontal dural margin. She was referred to our service for consideration of GK SRS. On 10/10/17, MRI brain showed a dural based multinodular soft tissue mass is seen within the right occipital lobe Contrast enhancement was seen within the right parieto-occipital sulci compatible with leptomeningeal tumor spread. On 10/12/17, she underwent an MRI brain which showed multiple enhancing metastases are again visualized involving the right cerebellum, right subinsular region, anterior left frontal lobe, left posterior frontal perisylvian, right parietal corona radiata, and right posterior frontal and parietal vertex.    She presented to the ED on 1/12/18 with worsening headache over the past week. Patient was in her usual state of health until 1 week ago when she started to develop a sharp headache. Pain is described as "sharp" and it is located primarily in her right occipital area. Pain initially waxed and waned, but it eventually became constant, making it difficult for patient to sleep at night. Patient's pain is located near the site of her prior craniotomy. She explains that a few weeks after the procedure, she developed a bump near the incision site which has slowly increased in size over the past month. Patient's pain initially resolved with Tylenol, but this eventually became ineffective. Since patient's pain was unrelenting, she came to the ED for further evaluation.     On ROS, patient endorses some chest pain when she coughs and occasional back pain, but denies any recent fever, chills, dizziness/lightheadedness, nausea, vomiting, diarrhea, abdominal pain, or dysuria. No recent falls. She has been having difficulty with vision with her left visual field cut, worsening right vision. Ambulation is affected by left sided weakness (not new) and visual deficits. She has two home health aids that help her throughout the day to safely move around her house.     MRI w/ contrast on 1/12/18 shows increased parenchymal metastatic disease, progression of right tentorial dural metastatic disease which is now seen indicating the right transverse sinus, torcula, and superior sagittal sinus, and extruding through the craniotomy to involve the overlying scalp.     Allergies    No Known Allergies    Intolerances    ROS: 12 point review of systems is otherwise negative unless stated in the HPI    PAST MEDICAL & SURGICAL HISTORY:  Brachial neuritis: left  Breast cancer  Breast cancer: left, mets to bone, lung, lymph nodes dx in 2014  mets to brain dx 2017  H/O brain surgery: craniotomy 3/2017  H/O bilateral mastectomy  No significant past surgical history  H/O bilateral mastectomy: 2014      FAMILY HISTORY:  No pertinent family history in first degree relatives      MEDICATIONS  (STANDING):  dexamethasone  Injectable 4 milliGRAM(s) IV Push every 6 hours  docusate sodium 100 milliGRAM(s) Oral three times a day  pantoprazole    Tablet 40 milliGRAM(s) Oral before breakfast  polyethylene glycol 3350 17 Gram(s) Oral daily  senna 2 Tablet(s) Oral at bedtime    PHYSICAL EXAM  Vital Signs Last 24 Hrs  T(C): 37.1 (13 Jan 2018 05:35), Max: 37.1 (13 Jan 2018 05:35)  T(F): 98.7 (13 Jan 2018 05:35), Max: 98.7 (13 Jan 2018 05:35)  HR: 87 (13 Jan 2018 05:35) (80 - 101)  BP: 115/79 (13 Jan 2018 05:35) (113/77 - 116/86)  BP(mean): --  RR: 18 (13 Jan 2018 05:35) (16 - 18)  SpO2: 98% (13 Jan 2018 05:35) (98% - 99%)    Constitutional: NAD  HEENT: Cyst-like structure near patient's occiput which is TTP. No overlying erythema seen. No discharge  Neck: Supple  Respiratory: CTAB; No wheeze appreciated, Respirations unlabored  Cardiovascular: RRR; +S1/S2  Gastrointestinal: +BS, Soft, NT, No rigidity  Extremities: No peripheral edema  Neurological: A+Ox3, Answers questions and follows commands appropriately, Moves all extremities, L hand is contracted (chronic), left visual field deficit   Skin: Warm and dry, Cyst-like structure near patient's occiput as noted above  Psychiatric: Normal mood and affect    IMAGING/LABS/PATHOLOGY: I have personally reviewed the relevant labs, pathology, and imaging as noted in the HPI.     ASSESSMENT/PLAN    Mrs. 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 She presents with headaches secondary to progression of her dural-based disease seen on her recent MRI w. contrast on 1/12/18. KPS 40    In total, she is s/p right occipital craniotomy on 3/15/17 and 10/11/17 and is s/p Gamma Knife SRS on 4/10/17, 6/12/17, and 7/31/17 (receiving 20 Gy x 1 to the right occipital resection cavity, left motor strip, two separate left frontal metastases, and a right parietal metastatic lesion). In October, there was discussion at multidisciplinary tumor board that imaging may demonstrate leptomeningeal spread and whole brain radiation treatment was recommended. Ms. Deluca was initially hesitant, but upon discussions with her daughter and second opinions is more amenable to treatment. She has been having worsening headaches at home but has felt they have resolved with dexamethasone and opiod analgesics. Based on her recent imaging and extensive dural metastases we feel whole brain radiation would be the best option for her care and have reviewed treatment with Ms. Deluca.    We discussed the use of palliative radiation in this setting, namely to improve quality of life through the reduction of symptoms.  We talked about the risks, benefits, acute and long term side effects, as well as expected treatment outcomes.  She was given the opportunity to ask questions, which were answered to his/her apparent satisfaction.  She provided written consent to proceed with radiation therapy. We will arrange for outpatient treatment.

## 2018-01-12 NOTE — H&P ADULT - PROBLEM SELECTOR PLAN 2
- Initially Dx in 3/2014 w/ extensive metastatic disease to bone, lung, and brain s/p b/l mastectomy w/ LN dissection, chemotherapy (last in 3/2017), radiation therapy, and 5 craniotomies. CT C/A/P in the ED showed progression of disease which likely explains patient's different pains.  - Will notify patient's Oncologist (Dr. William Byrd) of her admission in the AM  - Pain control as noted above

## 2018-01-12 NOTE — H&P ADULT - PROBLEM SELECTOR PLAN 1
- Patient p/w worsening right occipital headache near the site of a prior craniotomy in 10/2017. On exam, there is a tender cyst-like structure near patient's occiput. Head CT in the ED found new low attenuation lesions in the L frontal lobe and the R temporal lobe which likely represent metastatic disease. Patient evaluated by both Neurology and Neurosurgery in the ED who started patient on IV Decadron 4mg Q6H. Patient recommended to get a Brain MRI w/wo contrast as well as a Rad Onc consult for whole brain radiation.  - Brain MRI pending  - Will consult Rad Onc in the AM  - Follow-up Neuro/NeuroSx recs  - Neuro checks Q4H  - C/w IV Decadron. Will start Oxycodone IR PRN for pain control

## 2018-01-12 NOTE — H&P ADULT - HISTORY OF PRESENT ILLNESS
Patient is a 56 y/o F w/ a PMHx of breast cancer w/ mets to bone, lung, brain, and lymph nodes (Dx 3/2014) s/p b/l mastectomy w/ LN dissection, chemotherapy (last in 3/2017), radiation therapy, and 5 craniotomies (last in 10/2017 - Rt occipital) who presents to the ED with worsening headache over the past week. Patient was in her usual state of health until 1 week ago when she started to develop a sharp headache. Pain is described as "sharp" and it is located primarily in her right occipital area. Pain initially waxed and waned, but it eventually became constant, making it difficult for patient to sleep at night. Patient's pain is located near the site of her prior craniotomy. She explains that a few weeks after the procedure, she developed a bump near the incision site which has slowly increased in size over the past month. Patient's pain initially resolved with Tylenol, but this eventually became ineffective. Since patient's pain was unrelenting, she came to the ED for further evaluation. On ROS, patient endorses some chest pain when she coughs and occasional back pain, but denies any recent fever, chills, dizziness/lightheadedness, vision changes, nausea, vomiting, diarrhea, abdominal pain, or dysuria.     In the ED, pt's VS were: T = 99.4F, HR = 104, BP = 103/71, RR = 18, O2 Sat = 100% on RA. Labs were significant for thrombocytosis (platelet count = 483), negative Josemanuel x1, a lactate of 1.5, and a negative RVP. Patient was given IV Tylenol 1g x1, PO Oxycodone IR 5mg x1, and IV Ketamine 16mg x1. Patient was then admitted to Medicine for further management. Patient is a 56 y/o F w/ a PMHx of breast cancer w/ mets to bone, lung, brain, and lymph nodes (Dx 3/2014) s/p b/l mastectomy w/ LN dissection, chemotherapy (last in 3/2017), radiation therapy, and 5 craniotomies (last in 10/2017 - Rt occipital) who presents to the ED with worsening headache over the past week. Patient was in her usual state of health until 1 week ago when she started to develop a sharp headache. Pain is described as "sharp" and it is located primarily in her right occipital area. Pain initially waxed and waned, but it eventually became constant, making it difficult for patient to sleep at night. Patient's pain is located near the site of her prior craniotomy. She explains that a few weeks after the procedure, she developed a bump near the incision site which has slowly increased in size over the past month. Patient's pain initially resolved with Tylenol, but this eventually became ineffective. Since patient's pain was unrelenting, she came to the ED for further evaluation. On ROS, patient endorses some chest pain when she coughs and occasional back pain, but denies any recent fever, chills, dizziness/lightheadedness, vision changes, nausea, vomiting, diarrhea, abdominal pain, or dysuria. No recent falls.     In the ED, pt's VS were: T = 99.4F, HR = 104, BP = 103/71, RR = 18, O2 Sat = 100% on RA. Labs were significant for thrombocytosis (platelet count = 483), negative Josemanuel x1, a lactate of 1.5, and a negative RVP. Patient was given IV Tylenol 1g x1, PO Oxycodone IR 5mg x1, and IV Ketamine 16mg x1. Patient was then admitted to Medicine for further management.

## 2018-01-12 NOTE — H&P ADULT - NSHPLABSRESULTS_GEN_ALL_CORE
CBC Full  -  ( 11 Jan 2018 12:50 )  WBC Count : 10.94 K/uL  Hemoglobin : 10.2 g/dL  Hematocrit : 32.5 %  Platelet Count - Automated : 483 K/uL  Mean Cell Volume : 86.4 fL  Mean Cell Hemoglobin : 27.1 pg  Mean Cell Hemoglobin Concentration : 31.4 %  Auto Neutrophil # : 9.49 K/uL  Auto Lymphocyte # : 0.78 K/uL  Auto Monocyte # : 0.60 K/uL  Auto Eosinophil # : 0.00 K/uL  Auto Basophil # : 0.01 K/uL  Auto Neutrophil % : 86.8 %  Auto Lymphocyte % : 7.1 %  Auto Monocyte % : 5.5 %  Auto Eosinophil % : 0.0 %  Auto Basophil % : 0.1 %    01-11    136  |  98  |  10  ----------------------------<  113<H>  3.9   |  26  |  0.36<L>    Ca    8.4      11 Jan 2018 12:50    TPro  6.6  /  Alb  2.9<L>  /  TBili  0.2  /  DBili  x   /  AST  10  /  ALT  5   /  AlkPhos  97  01-11    PT/INR - ( 11 Jan 2018 12:50 )   PT: 12.0 SEC;   INR: 1.04          PTT - ( 11 Jan 2018 12:50 )  PTT:30.9 SEC    CARDIAC MARKERS ( 11 Jan 2018 12:50 )  x     / < 0.06 ng/mL / 52 u/L / 1.00 ng/mL / x        EKG: Sinus tachycardia (Rate = 105bpm), NL axis, No acute ST changes, QT/QTc = 330/436    Head CT: New low attenuation lesions in the left frontal lobe and the right temporal lobe.  Given the patient's history, this likely represents neoplasm rather than infarction.    CT T-spine: Well-defined sclerotic density seen involving the T7 vertebral body.    CT L-spine: Normal.    CT C/A/P: No pulmonary embolus. The right upper lobe mass has increased in size and likely invades the hilum with enlarged, malignant mediastinal lymph nodes are malignant nodules in the right upper lobe. Upper abdominal lymph nodes are malignant. Right adrenal metastasis.

## 2018-01-12 NOTE — H&P ADULT - NSHPPHYSICALEXAM_GEN_ALL_CORE
Vital Signs Last 24 Hrs  T(C): 37.1 (12 Jan 2018 00:00), Max: 37.6 (11 Jan 2018 11:35)  T(F): 98.8 (12 Jan 2018 00:00), Max: 99.6 (11 Jan 2018 11:35)  HR: 99 (12 Jan 2018 00:00) (94 - 109)  BP: 110/74 (12 Jan 2018 00:00) (103/71 - 116/84)  BP(mean): --  RR: 16 (12 Jan 2018 00:00) (16 - 22)  SpO2: 98% (12 Jan 2018 00:00) (98% - 100%)    PHYSICAL EXAM:  Constitutional:  Eyes:  ENMT:  Neck:  Breasts:  Back:  Respiratory:  Cardiovascular:  Gastrointestinal:  Genitourinary:  Rectal:  Extremities:  Vascular:  Neurological:  Skin:  Lymph Nodes:  Musculoskeletal:  Psychiatric: Vital Signs Last 24 Hrs  T(C): 37.1 (12 Jan 2018 00:00), Max: 37.6 (11 Jan 2018 11:35)  T(F): 98.8 (12 Jan 2018 00:00), Max: 99.6 (11 Jan 2018 11:35)  HR: 99 (12 Jan 2018 00:00) (94 - 109)  BP: 110/74 (12 Jan 2018 00:00) (103/71 - 116/84)  BP(mean): --  RR: 16 (12 Jan 2018 00:00) (16 - 22)  SpO2: 98% (12 Jan 2018 00:00) (98% - 100%)    PHYSICAL EXAM:  Constitutional: NAD  HEENT: Cyst-like structure near patient's occiput which is TTP. No overlying erythema seen. No discharge  Neck: Supple  Respiratory: CTAB; No wheeze appreciated, Respirations unlabored  Cardiovascular: RRR; +S1/S2  Gastrointestinal: +BS, Soft, NT, No rigidity  Genitourinary: No Cortes, No suprapubic TTP  Extremities: No peripheral edema  Neurological: A+Ox3, Answers questions and follows commands appropriately, Moves all extremities, L hand is contracted (chronic), PERRLA  Skin: Warm and dry, Cyst-like structure near patient's occiput as noted above  Psychiatric: Normal mood and affect

## 2018-01-12 NOTE — H&P ADULT - NSHPSOCIALHISTORY_GEN_ALL_CORE
No history of EtOH, smoking, or illicit drug use  Lives at home with daughter  Ambulates with a walker at baseline  Works as a teacher

## 2018-01-12 NOTE — PROGRESS NOTE ADULT - SUBJECTIVE AND OBJECTIVE BOX
Patient is a 57y old  Female who presents with a chief complaint of Headache (12 Jan 2018 01:39)        SUBJECTIVE / OVERNIGHT EVENTS: Pt states she has minimal headache at this time. Chest discomfort has resolved. No other complaint.       MEDICATIONS  (STANDING):  dexamethasone  Injectable 4 milliGRAM(s) IV Push every 6 hours  docusate sodium 100 milliGRAM(s) Oral three times a day  senna 2 Tablet(s) Oral at bedtime    MEDICATIONS  (PRN):  acetaminophen   Tablet. 650 milliGRAM(s) Oral every 6 hours PRN Mild and moderate pain  guaiFENesin   Syrup  (Sugar-Free) 200 milliGRAM(s) Oral every 6 hours PRN Cough  oxyCODONE    IR 5 milliGRAM(s) Oral every 4 hours PRN Severe Pain (7 - 10)      Vital Signs Last 24 Hrs  T(C): 36.9 (12 Jan 2018 05:40), Max: 37.1 (11 Jan 2018 14:48)  T(F): 98.4 (12 Jan 2018 05:40), Max: 98.8 (11 Jan 2018 14:48)  HR: 99 (12 Jan 2018 05:40) (94 - 109)  BP: 109/78 (12 Jan 2018 05:40) (105/75 - 116/84)  BP(mean): --  RR: 16 (12 Jan 2018 05:40) (16 - 22)  SpO2: 98% (12 Jan 2018 05:40) (98% - 100%)  CAPILLARY BLOOD GLUCOSE        I&O's Summary        PHYSICAL EXAM  GENERAL: NAD, well-developed  HEAD:  Atraumatic, Normocephalic  EYES: EOMI, PERRLA, conjunctiva and sclera clear  NECK: Supple, No JVD  CHEST/LUNG: Clear to auscultation bilaterally; No wheeze  HEART: Regular rate and rhythm; No murmurs, rubs, or gallops  ABDOMEN: Soft, Nontender, Nondistended; Bowel sounds present  EXTREMITIES:  2+ Peripheral Pulses, No clubbing, cyanosis, or edema  PSYCH: AAOx3  SKIN: No rashes or lesions    LABS:                        9.8    12.34 )-----------( 459      ( 12 Jan 2018 05:30 )             31.1     01-12    135  |  97<L>  |  7   ----------------------------<  133<H>  4.7   |  23  |  0.33<L>    Ca    8.1<L>      12 Jan 2018 05:30  Phos  2.5     01-12  Mg     2.1     01-12    TPro  6.3  /  Alb  2.7<L>  /  TBili  0.2  /  DBili  x   /  AST  7   /  ALT  5   /  AlkPhos  92  01-12    PT/INR - ( 11 Jan 2018 12:50 )   PT: 12.0 SEC;   INR: 1.04          PTT - ( 11 Jan 2018 12:50 )  PTT:30.9 SEC  CARDIAC MARKERS ( 11 Jan 2018 12:50 )  x     / < 0.06 ng/mL / 52 u/L / 1.00 ng/mL / x

## 2018-01-12 NOTE — H&P ADULT - ASSESSMENT
Patient is a 56 y/o F w/ a PMHx of breast cancer w/ mets to bone, lung, brain, and lymph nodes (Dx 3/2014) s/p b/l mastectomy w/ LN dissection, chemotherapy (last in 3/2017), radiation therapy, and 5 craniotomies (last in 10/2017 - Rt occipital) who presents to the ED with worsening headache over the past week, found to have new lesions in the left frontal lobe and right temporal lobe on head CT likely 2/2 progression of underlying breast cancer.

## 2018-01-12 NOTE — H&P ADULT - PROBLEM SELECTOR PLAN 3
- Patient found to have a platelet count of 483 on presentation. Suspect this is likely reactionary from underlying progression of disease. Patient without signs or symptoms of infection currently. Will monitor patient off antibiotics.  - Monitor CBC

## 2018-01-12 NOTE — H&P ADULT - NSHPREVIEWOFSYSTEMS_GEN_ALL_CORE
REVIEW OF SYSTEMS  General:	  Skin/Breast:  Ophthalmologic:  ENMT:	  Respiratory and Thorax:  Cardiovascular:	  Gastrointestinal:	  Genitourinary:	  Musculoskeletal:	  Neurological: REVIEW OF SYSTEMS  General: No fever or chills  Skin/Breast: + Bump on R occiput, No rash  Ophthalmologic: No eye pain or vision changes  ENMT: No sore throat or nasal congestion  Respiratory and Thorax: + Cough, No shortness of breath  Cardiovascular: + Chest pain with coughing, No palpitations  Gastrointestinal: No nausea, vomiting, diarrhea, or abdominal pain  Genitourinary: No dysuria or hematuria  Musculoskeletal: + Occasional back pain, No joint pain  Neurological: + Headache, No confusion

## 2018-01-13 DIAGNOSIS — R51 HEADACHE: ICD-10-CM

## 2018-01-13 PROCEDURE — 99233 SBSQ HOSP IP/OBS HIGH 50: CPT

## 2018-01-13 RX ORDER — OXYCODONE HYDROCHLORIDE 5 MG/1
10 TABLET ORAL EVERY 6 HOURS
Qty: 0 | Refills: 0 | Status: DISCONTINUED | OUTPATIENT
Start: 2018-01-13 | End: 2018-01-14

## 2018-01-13 RX ORDER — OXYCODONE AND ACETAMINOPHEN 5; 325 MG/1; MG/1
2 TABLET ORAL EVERY 6 HOURS
Qty: 0 | Refills: 0 | Status: DISCONTINUED | OUTPATIENT
Start: 2018-01-13 | End: 2018-01-13

## 2018-01-13 RX ORDER — MORPHINE SULFATE 50 MG/1
2 CAPSULE, EXTENDED RELEASE ORAL EVERY 4 HOURS
Qty: 0 | Refills: 0 | Status: DISCONTINUED | OUTPATIENT
Start: 2018-01-13 | End: 2018-01-14

## 2018-01-13 RX ORDER — LACTULOSE 10 G/15ML
10 SOLUTION ORAL EVERY 6 HOURS
Qty: 0 | Refills: 0 | Status: DISCONTINUED | OUTPATIENT
Start: 2018-01-13 | End: 2018-01-14

## 2018-01-13 RX ORDER — ACETAMINOPHEN 500 MG
650 TABLET ORAL EVERY 6 HOURS
Qty: 0 | Refills: 0 | Status: DISCONTINUED | OUTPATIENT
Start: 2018-01-13 | End: 2018-01-15

## 2018-01-13 RX ADMIN — Medication 200 MILLIGRAM(S): at 22:35

## 2018-01-13 RX ADMIN — Medication 200 MILLIGRAM(S): at 05:28

## 2018-01-13 RX ADMIN — Medication 100 MILLIGRAM(S): at 16:34

## 2018-01-13 RX ADMIN — MORPHINE SULFATE 2 MILLIGRAM(S): 50 CAPSULE, EXTENDED RELEASE ORAL at 10:44

## 2018-01-13 RX ADMIN — LACTULOSE 10 GRAM(S): 10 SOLUTION ORAL at 11:23

## 2018-01-13 RX ADMIN — MORPHINE SULFATE 2 MILLIGRAM(S): 50 CAPSULE, EXTENDED RELEASE ORAL at 00:34

## 2018-01-13 RX ADMIN — MORPHINE SULFATE 2 MILLIGRAM(S): 50 CAPSULE, EXTENDED RELEASE ORAL at 05:30

## 2018-01-13 RX ADMIN — POLYETHYLENE GLYCOL 3350 17 GRAM(S): 17 POWDER, FOR SOLUTION ORAL at 16:34

## 2018-01-13 RX ADMIN — Medication 200 MILLIGRAM(S): at 16:34

## 2018-01-13 RX ADMIN — Medication 4 MILLIGRAM(S): at 18:54

## 2018-01-13 RX ADMIN — OXYCODONE HYDROCHLORIDE 10 MILLIGRAM(S): 5 TABLET ORAL at 22:34

## 2018-01-13 RX ADMIN — OXYCODONE HYDROCHLORIDE 10 MILLIGRAM(S): 5 TABLET ORAL at 23:11

## 2018-01-13 RX ADMIN — Medication 4 MILLIGRAM(S): at 05:14

## 2018-01-13 RX ADMIN — PANTOPRAZOLE SODIUM 40 MILLIGRAM(S): 20 TABLET, DELAYED RELEASE ORAL at 05:29

## 2018-01-13 RX ADMIN — Medication 4 MILLIGRAM(S): at 11:48

## 2018-01-13 RX ADMIN — Medication 100 MILLIGRAM(S): at 05:14

## 2018-01-13 RX ADMIN — OXYCODONE HYDROCHLORIDE 10 MILLIGRAM(S): 5 TABLET ORAL at 17:25

## 2018-01-13 RX ADMIN — OXYCODONE HYDROCHLORIDE 10 MILLIGRAM(S): 5 TABLET ORAL at 16:34

## 2018-01-13 RX ADMIN — MORPHINE SULFATE 2 MILLIGRAM(S): 50 CAPSULE, EXTENDED RELEASE ORAL at 10:59

## 2018-01-13 RX ADMIN — MORPHINE SULFATE 2 MILLIGRAM(S): 50 CAPSULE, EXTENDED RELEASE ORAL at 00:19

## 2018-01-13 RX ADMIN — MORPHINE SULFATE 2 MILLIGRAM(S): 50 CAPSULE, EXTENDED RELEASE ORAL at 05:14

## 2018-01-13 RX ADMIN — Medication 4 MILLIGRAM(S): at 23:10

## 2018-01-13 NOTE — PHYSICAL THERAPY INITIAL EVALUATION ADULT - GENERAL OBSERVATIONS, REHAB EVAL
Patient found semi reclined in bed in NAD; patient reports left eye no peripheral vision; requires assist with left hand to maintain on walker.

## 2018-01-13 NOTE — PROGRESS NOTE ADULT - ASSESSMENT
57F with breast cancer w/ mets to bone, lung, brain, and lymph nodes (Dx 3/2014) s/p b/l mastectomy w/ LN dissection, chemotherapy (last in 3/2017), radiation therapy (gamma knife x 2), and 5 craniotomies (last in 10/2017 - Rt occipital) who presents with worsening headache, found to have new brain metastases in the left frontal lobe and right temporal lobe. 57F with breast cancer w/ mets to bone, lung, brain, and lymph nodes (Dx 3/2014) s/p b/l mastectomy w/ LN dissection, chemotherapy (last in 3/2017), radiation therapy (gamma knife x 2), and 5 craniotomies (last in 10/2017 - Rt occipital) who presents with worsening headache, found to have new brain metastases in the left frontal lobe and right temporal lobe.  6/10 HA.  Constipation.  Planning for OUT PATIENT RT

## 2018-01-13 NOTE — PHYSICAL THERAPY INITIAL EVALUATION ADULT - PERTINENT HX OF CURRENT PROBLEM, REHAB EVAL
Patient is a 58 y/o F w/ a PMHx of breast cancer w/ mets to bone, lung, brain, and lymph nodes (Dx 3/2014) s/p b/l mastectomy w/ LN dissection, chemo (last in 3/2017), rx therapy, and 5 craniotomies (last in 10/2017 - Rt occipital) who presents to the ED with worsening headache over the past week.

## 2018-01-13 NOTE — PHYSICAL THERAPY INITIAL EVALUATION ADULT - PLANNED THERAPY INTERVENTIONS, PT EVAL
bed mobility training/transfer training/Patient left semi reclined in bed in NAD; call bell in reach; YARON De La Torre aware/balance training/strengthening/gait training

## 2018-01-13 NOTE — PHYSICAL THERAPY INITIAL EVALUATION ADULT - GAIT DISTANCE, PT EVAL
Patient requires vcs for directions with walker due to no left eye peripheral vision and assist to maintain left hand on walker/75 feet

## 2018-01-13 NOTE — PROGRESS NOTE ADULT - PROBLEM SELECTOR PLAN 2
- Widely metastatic as above, and also with enlargement of right lung mass with invasion of hilum as well as right adrenal metastasis on today's imaging. Though pt appears to have good performance status  - Will notify patient's Oncologist (Dr. William Byrd) of her admission in the AM - Widely metastatic as above, and also with enlargement of right lung mass with invasion of hilum as well as right adrenal metastasis on today's imaging. Though pt appears to have good performance status

## 2018-01-13 NOTE — PROGRESS NOTE ADULT - ATTENDING COMMENTS
Rt _onc-rec   discussed the use of palliative radiation in this setting, namely to improve quality of life through the reduction of symptoms.  We talked about the risks, benefits, acute and long term side effects, as well as expected treatment outcomes.  She was given the opportunity to ask questions, which were answered to his/her apparent satisfaction.  She provided written consent to proceed with radiation therapy. We will arrange for outpatient treatment. Rt _onc-rec   discussed the use of palliative radiation in this setting, namely to improve quality of life through the reduction of symptoms.  We talked about the risks, benefits, acute and long term side effects, as well as expected treatment outcomes.  She was given the opportunity to ask questions, which were answered to his/her apparent satisfaction.  She provided written consent to proceed with radiation therapy. We will arrange for outpatient treatment.    HA- start percocet prn.  Iv morp for break through.  senna/colace /miralax.  pt eval Rt _onc-rec   discussed the use of palliative radiation in this setting, namely to improve quality of life through the reduction of symptoms.  We talked about the risks, benefits, acute and long term side effects, as well as expected treatment outcomes.  She was given the opportunity to ask questions, which were answered to his/her apparent satisfaction.  She provided written consent to proceed with radiation therapy. We will arrange for outpatient treatment.    HA- start percocet prn., use Morophine iv for breakthrough pain  Iv morp for break through.  senna/colace /miralax.- already ordered- start lactulose untill BM. Stat lactulose 10 cc q6 untill BM  pt eval

## 2018-01-13 NOTE — PROGRESS NOTE ADULT - SUBJECTIVE AND OBJECTIVE BOX
Patient is a 57y old  Female who presents with a chief complaint of Headache (12 Jan 2018 01:39)      SUBJECTIVE / OVERNIGHT EVENTS:    MEDICATIONS  (STANDING):  dexamethasone  Injectable 4 milliGRAM(s) IV Push every 6 hours  docusate sodium 100 milliGRAM(s) Oral three times a day  pantoprazole    Tablet 40 milliGRAM(s) Oral before breakfast  polyethylene glycol 3350 17 Gram(s) Oral daily  senna 2 Tablet(s) Oral at bedtime    MEDICATIONS  (PRN):  acetaminophen   Tablet. 650 milliGRAM(s) Oral every 6 hours PRN Mild and moderate pain  guaiFENesin   Syrup  (Sugar-Free) 200 milliGRAM(s) Oral every 6 hours PRN Cough  morphine  - Injectable 2 milliGRAM(s) IV Push every 4 hours PRN Severe Pain (7 - 10)  oxyCODONE    IR 5 milliGRAM(s) Oral every 4 hours PRN Moderate Pain (4 - 6)    PHYSICAL EXAM:  Vital Signs Last 24 Hrs  T(C): 37.1 (13 Jan 2018 05:35), Max: 37.1 (13 Jan 2018 05:35)  T(F): 98.7 (13 Jan 2018 05:35), Max: 98.7 (13 Jan 2018 05:35)  HR: 87 (13 Jan 2018 05:35) (80 - 101)  BP: 115/79 (13 Jan 2018 05:35) (113/77 - 116/86)  BP(mean): --  RR: 18 (13 Jan 2018 05:35) (16 - 18)  SpO2: 98% (13 Jan 2018 05:35) (98% - 99%)  GENERAL: NAD, well-developed  HEAD:  Atraumatic, Normocephalic  EYES: EOMI, PERRLA, conjunctiva and sclera clear  NECK: Supple, No JVD  CHEST/LUNG: Clear to auscultation bilaterally; No wheeze  HEART: Regular rate and rhythm; No murmurs, rubs, or gallops  ABDOMEN: Soft, Nontender, Nondistended; Bowel sounds present  EXTREMITIES:  2+ Peripheral Pulses, No clubbing, cyanosis, or edema  PSYCH: AAOx3  NEUROLOGY: non-focal  SKIN: No rashes or lesions    LABS:                        9.8    12.34 )-----------( 459      ( 12 Jan 2018 05:30 )             31.1     01-12    135  |  97<L>  |  7   ----------------------------<  133<H>  4.7   |  23  |  0.33<L>    Ca    8.1<L>      12 Jan 2018 05:30  Phos  2.5     01-12  Mg     2.1     01-12    TPro  6.3  /  Alb  2.7<L>  /  TBili  0.2  /  DBili  x   /  AST  7   /  ALT  5   /  AlkPhos  92  01-12    PT/INR - ( 11 Jan 2018 12:50 )   PT: 12.0 SEC;   INR: 1.04          PTT - ( 11 Jan 2018 12:50 )  PTT:30.9 SEC  CARDIAC MARKERS ( 11 Jan 2018 12:50 )  x     / < 0.06 ng/mL / 52 u/L / 1.00 ng/mL / x              RADIOLOGY & ADDITIONAL TESTS:    Imaging Personally Reviewed:    Consultant(s) Notes Reviewed:      Care Discussed with Consultants/Other Providers: Patient is a 57y old  Female who presents with a chief complaint of Headache (12 Jan 2018 01:39)      SUBJECTIVE / OVERNIGHT EVENTS:  Patient notes Ha 6/10 - not new- had x over 5 days.  No n/v. or diplopia.  No BM x 3days.  Informed of plans for out patient RT.        MEDICATIONS  (STANDING):  dexamethasone  Injectable 4 milliGRAM(s) IV Push every 6 hours  docusate sodium 100 milliGRAM(s) Oral three times a day  pantoprazole    Tablet 40 milliGRAM(s) Oral before breakfast  polyethylene glycol 3350 17 Gram(s) Oral daily  senna 2 Tablet(s) Oral at bedtime    MEDICATIONS  (PRN):  acetaminophen   Tablet. 650 milliGRAM(s) Oral every 6 hours PRN Mild and moderate pain  guaiFENesin   Syrup  (Sugar-Free) 200 milliGRAM(s) Oral every 6 hours PRN Cough  morphine  - Injectable 2 milliGRAM(s) IV Push every 4 hours PRN Severe Pain (7 - 10)  oxyCODONE    IR 5 milliGRAM(s) Oral every 4 hours PRN Moderate Pain (4 - 6)    PHYSICAL EXAM:  Vital Signs Last 24 Hrs  T(C): 37.1 (13 Jan 2018 05:35), Max: 37.1 (13 Jan 2018 05:35)  T(F): 98.7 (13 Jan 2018 05:35), Max: 98.7 (13 Jan 2018 05:35)  HR: 87 (13 Jan 2018 05:35) (80 - 101)  BP: 115/79 (13 Jan 2018 05:35) (113/77 - 116/86)  BP(mean): --  RR: 18 (13 Jan 2018 05:35) (16 - 18)  SpO2: 98% (13 Jan 2018 05:35) (98% - 99%)  GENERAL: NAD, well-developed  HEAD:  Atraumatic, Normocephalic  EYES: EOMI, PERRLA, conjunctiva and sclera clear  NECK: Supple, No JVD  CHEST/LUNG: Clear to auscultation bilaterally; No wheeze  HEART: Regular rate and rhythm; No murmurs, rubs, or gallops  ABDOMEN: Soft, Nontender, Nondistended; Bowel sounds present  EXTREMITIES:  2+ Peripheral Pulses, No clubbing, cyanosis, or edema  PSYCH: AAOx3  NEUROLOGY: non-focal  SKIN: No rashes or lesions    LABS:                        9.8    12.34 )-----------( 459      ( 12 Jan 2018 05:30 )             31.1     01-12    135  |  97<L>  |  7   ----------------------------<  133<H>  4.7   |  23  |  0.33<L>    Ca    8.1<L>      12 Jan 2018 05:30  Phos  2.5     01-12  Mg     2.1     01-12    TPro  6.3  /  Alb  2.7<L>  /  TBili  0.2  /  DBili  x   /  AST  7   /  ALT  5   /  AlkPhos  92  01-12    PT/INR - ( 11 Jan 2018 12:50 )   PT: 12.0 SEC;   INR: 1.04          PTT - ( 11 Jan 2018 12:50 )  PTT:30.9 SEC  CARDIAC MARKERS ( 11 Jan 2018 12:50 )  x     / < 0.06 ng/mL / 52 u/L / 1.00 ng/mL / x              RADIOLOGY & ADDITIONAL TESTS:    Imaging Personally Reviewed:    Consultant(s) Notes Reviewed:      Care Discussed with Consultants/Other Providers:

## 2018-01-14 ENCOUNTER — TRANSCRIPTION ENCOUNTER (OUTPATIENT)
Age: 58
End: 2018-01-14

## 2018-01-14 PROCEDURE — 99233 SBSQ HOSP IP/OBS HIGH 50: CPT

## 2018-01-14 RX ORDER — MORPHINE SULFATE 50 MG/1
1 CAPSULE, EXTENDED RELEASE ORAL ONCE
Qty: 0 | Refills: 0 | Status: DISCONTINUED | OUTPATIENT
Start: 2018-01-14 | End: 2018-01-14

## 2018-01-14 RX ORDER — OXYCODONE HYDROCHLORIDE 5 MG/1
10 TABLET ORAL EVERY 4 HOURS
Qty: 0 | Refills: 0 | Status: DISCONTINUED | OUTPATIENT
Start: 2018-01-14 | End: 2018-01-15

## 2018-01-14 RX ORDER — DEXAMETHASONE 0.5 MG/5ML
4 ELIXIR ORAL EVERY 8 HOURS
Qty: 0 | Refills: 0 | Status: DISCONTINUED | OUTPATIENT
Start: 2018-01-14 | End: 2018-01-15

## 2018-01-14 RX ADMIN — MORPHINE SULFATE 1 MILLIGRAM(S): 50 CAPSULE, EXTENDED RELEASE ORAL at 22:24

## 2018-01-14 RX ADMIN — MORPHINE SULFATE 1 MILLIGRAM(S): 50 CAPSULE, EXTENDED RELEASE ORAL at 22:09

## 2018-01-14 RX ADMIN — Medication 200 MILLIGRAM(S): at 20:06

## 2018-01-14 RX ADMIN — OXYCODONE HYDROCHLORIDE 10 MILLIGRAM(S): 5 TABLET ORAL at 11:31

## 2018-01-14 RX ADMIN — Medication 200 MILLIGRAM(S): at 06:41

## 2018-01-14 RX ADMIN — OXYCODONE HYDROCHLORIDE 10 MILLIGRAM(S): 5 TABLET ORAL at 21:00

## 2018-01-14 RX ADMIN — Medication 100 MILLIGRAM(S): at 20:06

## 2018-01-14 RX ADMIN — Medication 100 MILLIGRAM(S): at 06:39

## 2018-01-14 RX ADMIN — Medication 4 MILLIGRAM(S): at 14:18

## 2018-01-14 RX ADMIN — OXYCODONE HYDROCHLORIDE 10 MILLIGRAM(S): 5 TABLET ORAL at 20:05

## 2018-01-14 RX ADMIN — SENNA PLUS 2 TABLET(S): 8.6 TABLET ORAL at 20:05

## 2018-01-14 RX ADMIN — POLYETHYLENE GLYCOL 3350 17 GRAM(S): 17 POWDER, FOR SOLUTION ORAL at 14:18

## 2018-01-14 RX ADMIN — Medication 4 MILLIGRAM(S): at 20:06

## 2018-01-14 RX ADMIN — Medication 4 MILLIGRAM(S): at 06:39

## 2018-01-14 RX ADMIN — OXYCODONE HYDROCHLORIDE 10 MILLIGRAM(S): 5 TABLET ORAL at 07:20

## 2018-01-14 RX ADMIN — OXYCODONE HYDROCHLORIDE 10 MILLIGRAM(S): 5 TABLET ORAL at 06:39

## 2018-01-14 RX ADMIN — OXYCODONE HYDROCHLORIDE 10 MILLIGRAM(S): 5 TABLET ORAL at 12:31

## 2018-01-14 RX ADMIN — Medication 100 MILLIGRAM(S): at 14:18

## 2018-01-14 RX ADMIN — PANTOPRAZOLE SODIUM 40 MILLIGRAM(S): 20 TABLET, DELAYED RELEASE ORAL at 06:39

## 2018-01-14 NOTE — DISCHARGE NOTE ADULT - PATIENT PORTAL LINK FT
“You can access the Follow[employer] Patient Portal, offered by [facility], by registering with the following website: [email or web address]”

## 2018-01-14 NOTE — DISCHARGE NOTE ADULT - CARE PROVIDER_API CALL
Poli Capps), Therapeutic Radiology  [address] [place] [address]  Phone: [phone]  Fax: [phone]    Derian Byrd (MD), Internal Medicine; Medical Oncology  [address] [address]  Phone: [phone]  Fax: [phone]    Ned Estes), Neurosurgery  General  [address] [place] [id number]  Phone: [phone]  Fax: [phone]

## 2018-01-14 NOTE — DISCHARGE NOTE ADULT - MEDICATION SUMMARY - MEDICATIONS TO TAKE
I will START or STAY ON the medications listed below when I get home from the hospital:    dexamethasone 4 mg oral tablet  -- 1 tab(s) by mouth every 8 hours  -- Indication: For Brain metastases    oxyCODONE 10 mg oral tablet  -- 1 tab(s) by mouth every 4 hours, As needed, mod and severe pain MDD:6 tabs  -- Indication: For Breast cancer    guaiFENesin 100 mg/5 mL oral liquid  -- 10 milliliter(s) by mouth every 6 hours, As needed, Cough  -- Indication: For cough    senna oral tablet  -- 2 tab(s) by mouth once a day (at bedtime)  -- Indication: For Need for prophylactic measure    docusate sodium 100 mg oral capsule  -- 1 cap(s) by mouth 3 times a day  -- Indication: For Need for prophylactic measure    pantoprazole 40 mg oral delayed release tablet  -- 1 tab(s) by mouth once a day (before a meal)  -- Indication: For stomach protectant due to decardon I will START or STAY ON the medications listed below when I get home from the hospital:    dexamethasone 4 mg oral tablet  -- 1 tab(s) by mouth every 8 hours  -- Indication: For Brain metastases    oxyCODONE 10 mg oral tablet  -- 1 tab(s) by mouth every 4 hours, As needed, mod and severe pain MDD:6 tabs  -- Indication: For Breast cancer    guaiFENesin 100 mg/5 mL oral liquid  -- 10 milliliter(s) by mouth every 6 hours, As needed, Cough  -- Indication: For cough    senna oral tablet  -- 2 tab(s) by mouth once a day (at bedtime)  -- Indication: For constipation    docusate sodium 100 mg oral capsule  -- 1 cap(s) by mouth 3 times a day  -- Indication: For Need for prophylactic measure    pantoprazole 40 mg oral delayed release tablet  -- 1 tab(s) by mouth once a day (before a meal)  -- Indication: For Need for prophylactic measure I will START or STAY ON the medications listed below when I get home from the hospital:    dexamethasone 4 mg oral tablet  -- 1 tab(s) by mouth every 8 hours  -- Indication: For Brain metastases    oxyCODONE 10 mg oral tablet  -- 1 tab(s) by mouth every 4 hours, As needed, mod and severe pain MDD:6 tabs  -- Indication: For Breast cancer    guaiFENesin 100 mg/5 mL oral liquid  -- 10 milliliter(s) by mouth every 6 hours, As needed, Cough  -- Indication: For cough- over the counte    senna oral tablet  -- 2 tab(s) by mouth once a day (at bedtime)  -- Indication: For constipation    docusate sodium 100 mg oral capsule  -- 1 cap(s) by mouth 3 times a day  -- Indication: For Need for prophylactic measure    omeprazole 20 mg oral delayed release tablet  -- 1 tab(s) by mouth once a day   -- Obtain medical advice before taking any non-prescription drugs as some may affect the action of this medication.  Swallow whole.  Do not crush.    -- Indication: For Need for prophylactic measure

## 2018-01-14 NOTE — DISCHARGE NOTE ADULT - HOSPITAL COURSE
Patient is a 56 y/o F w/ a PMHx of breast cancer w/ mets to bone, lung, brain, and lymph nodes (Dx 3/2014) s/p b/l mastectomy w/ LN dissection, chemotherapy (last in 3/2017), radiation therapy, and 5 craniotomies (last in 10/2017 - Rt occipital) who presents to the ED with worsening headache over the past week. Patient was in her usual state of health until 1 week ago when she started to develop a sharp headache. Pain is described as "sharp" and it is located primarily in her right occipital area. Pain initially waxed and waned, but it eventually became constant, making it difficult for patient to sleep at night. Patient's pain is located near the site of her prior craniotomy. She explains that a few weeks after the procedure, she developed a bump near the incision site which has slowly increased in size over the past month. Patient's pain initially resolved with Tylenol, but this eventually became ineffective. Since patient's pain was unrelenting, she came to the ED for further evaluation. On ROS, patient endorses some chest pain when she coughs and occasional back pain, but denies any recent fever, chills, dizziness/lightheadedness, vision changes, nausea, vomiting, diarrhea, abdominal pain, or dysuria. No recent falls.     In the ED, pt's VS were: T = 99.4F, HR = 104, BP = 103/71, RR = 18, O2 Sat = 100% on RA. Labs were significant for thrombocytosis (platelet count = 483), negative Josemanuel x1, a lactate of 1.5, and a negative RVP. Patient was given IV Tylenol 1g x1, PO Oxycodone IR 5mg x1, and IV Ketamine 16mg x1. Patient was then admitted to Medicine for further management.  HOSPITAL COURSE:  patient was seen by RT- Onc with rec for out patient RT.  she noted HA and her Oxy Ir was inc from 5 to 10 mg q6 prn with good pain control.  Pt rec - home with home PT.  D/c home on 1/14/18 with out patient f/u with Onc and Rt- Onc Patient is a 56 y/o F w/ a PMHx of breast cancer w/ mets to bone, lung, brain, and lymph nodes (Dx 3/2014) s/p b/l mastectomy w/ LN dissection, chemotherapy (last in 3/2017), radiation therapy, and 5 craniotomies (last in 10/2017 - Rt occipital) who presents to the ED with worsening headache over the past week. Patient was in her usual state of health until 1 week ago when she started to develop a sharp headache. Pain is described as "sharp" and it is located primarily in her right occipital area. Pain initially waxed and waned, but it eventually became constant, making it difficult for patient to sleep at night. Patient's pain is located near the site of her prior craniotomy. She explains that a few weeks after the procedure, she developed a bump near the incision site which has slowly increased in size over the past month. Patient's pain initially resolved with Tylenol, but this eventually became ineffective. Since patient's pain was unrelenting, she came to the ED for further evaluation. On ROS, patient endorses some chest pain when she coughs and occasional back pain, but denies any recent fever, chills, dizziness/lightheadedness, vision changes, nausea, vomiting, diarrhea, abdominal pain, or dysuria. No recent falls.     In the ED, pt's VS were: T = 99.4F, HR = 104, BP = 103/71, RR = 18, O2 Sat = 100% on RA. Labs were significant for thrombocytosis (platelet count = 483), negative Josemanuel x1, a lactate of 1.5, and a negative RVP. Patient was given IV Tylenol 1g x1, PO Oxycodone IR 5mg x1, and IV Ketamine 16mg x1. Patient was then admitted to Medicine for further management.  HOSPITAL COURSE:  patient was seen by RT- Onc with rec for out patient RT.  she noted HA and her Oxy Ir was inc from 5 to 10 mg q6 prn with good pain control.  Pt rec - home with home PT.  D/c home on 1/15/18 with out patient f/u with Onc and Rt- Onc

## 2018-01-14 NOTE — DISCHARGE NOTE ADULT - REASON FOR ADMISSION
Headache sec to met Headache sec to metastatic breast Ca to Brain. Headache sec to metastatic breast Ca to Brain.- Out patient RT   Acute neoplastic pain

## 2018-01-14 NOTE — PROGRESS NOTE ADULT - ATTENDING COMMENTS
Patient is ready for home.  Pt rec- Home with home PT. Rolling walking.  Patient already has rolling walker.  Medications sent to [facility] - decadron/ protonix/oxy ir 10 mg q6 .

## 2018-01-14 NOTE — PROGRESS NOTE ADULT - ASSESSMENT
57F with breast cancer w/ mets to bone, lung, brain, and lymph nodes (Dx 3/2014) s/p b/l mastectomy w/ LN dissection, chemotherapy (last in 3/2017), radiation therapy (gamma knife x 2), and 5 craniotomies (last in 10/2017 - Rt occipital) who presents with worsening headache, found to have new brain metastases in the left frontal lobe and right temporal lobe.  6/10 HA.  Pain improved on oxy ir 10 mg q6 prn. Pain goes down to 2/10 after medication.  Constipation- resolved  Planning for OUT PATIENT RT

## 2018-01-14 NOTE — DISCHARGE NOTE ADULT - CARE PROVIDERS DIRECT ADDRESSES
[email or web address]

## 2018-01-14 NOTE — PROGRESS NOTE ADULT - SUBJECTIVE AND OBJECTIVE BOX
Patient is a 57y old  Female who presents with a chief complaint of Headache (12 Jan 2018 01:39)      SUBJECTIVE / OVERNIGHT EVENTS:  Patient notes pain is controlled on oxy ir 10 mg qd.  Aware of plan for out patient RT    MEDICATIONS  (STANDING):  dexamethasone  Injectable 4 milliGRAM(s) IV Push every 6 hours  docusate sodium 100 milliGRAM(s) Oral three times a day  lactulose Syrup 10 Gram(s) Oral every 6 hours  pantoprazole    Tablet 40 milliGRAM(s) Oral before breakfast  polyethylene glycol 3350 17 Gram(s) Oral daily  senna 2 Tablet(s) Oral at bedtime    MEDICATIONS  (PRN):  acetaminophen   Tablet. 650 milliGRAM(s) Oral every 6 hours PRN Mild Pain (1 - 3)  guaiFENesin   Syrup  (Sugar-Free) 200 milliGRAM(s) Oral every 6 hours PRN Cough  oxyCODONE    IR 10 milliGRAM(s) Oral every 6 hours PRN moderate and Severe pain      PHYSICAL EXAM:  Vital Signs Last 24 Hrs  T(C): 36.4 (14 Jan 2018 06:42), Max: 36.6 (13 Jan 2018 14:18)  T(F): 97.6 (14 Jan 2018 06:42), Max: 97.9 (13 Jan 2018 14:18)  HR: 76 (14 Jan 2018 06:42) (75 - 102)  BP: 113/78 (14 Jan 2018 06:42) (106/71 - 124/82)  BP(mean): --  RR: 18 (14 Jan 2018 06:42) (18 - 18)  SpO2: 100% (14 Jan 2018 06:42) (99% - 100%)  GENERAL: NAD, well-developed  HEAD:  Atraumatic, Normocephalic  EYES: EOMI, PERRLA, conjunctiva and sclera clear  NECK: Supple, No JVD  CHEST/LUNG: Clear to auscultation bilaterally; No wheeze  HEART: Regular rate and rhythm; No murmurs, rubs, or gallops  ABDOMEN: Soft, Nontender, Nondistended; Bowel sounds present  EXTREMITIES:  2+ Peripheral Pulses, No clubbing, cyanosis, or edema  PSYCH: AAOx3  NEUROLOGY: non-focal  SKIN: No rashes or lesions    LABS:        RADIOLOGY & ADDITIONAL TESTS:    Imaging Personally Reviewed:    Consultant(s) Notes Reviewed:      Care Discussed with Consultants/Other Providers:  oncology./ NP

## 2018-01-14 NOTE — DIETITIAN INITIAL EVALUATION ADULT. - OTHER INFO
Nutrition consult received on 1/12/18 for nutrition support . Pt. alert, oriented , reports decreased PO in hospital as it is not her type of food , but eating some of it and taking PO supplement , noted pt. is being discharged home today and states she will be ok . Pt. on bowel regimen 2/2 pain meds  w/ constipation

## 2018-01-14 NOTE — DISCHARGE NOTE ADULT - CARE PLAN
Principal Discharge DX:	Brain metastases  Goal:	Treat  Instructions for follow-up, activity and diet:	Please keep your appointment with RT for Brain mets RT  Secondary Diagnosis:	Breast cancer  Instructions for follow-up, activity and diet:	Please f/u with [facility]  Secondary Diagnosis:	Head ache  Goal:	treat  Instructions for follow-up, activity and diet:	RT -Oncology for radiation OUT PATIENT.  Oxy ir prn HA Principal Discharge DX:	Brain metastases  Goal:	Treat  Instructions for follow-up, activity and diet:	Please keep your appointment with RT for Brain mets RT. Radiation oncology will taper your decardon dose  Secondary Diagnosis:	Breast cancer  Instructions for follow-up, activity and diet:	Please f/u with [facility]  Secondary Diagnosis:	Head ache  Goal:	treat  Instructions for follow-up, activity and diet:	RT -Oncology for radiation OUT PATIENT.  Oxy ir prn HA Principal Discharge DX:	Brain metastases  Goal:	Treat  Assessment and plan of treatment:	Please keep your appointment with RT for Brain mets RT. Radiation oncology will taper your decardon dose  Secondary Diagnosis:	Breast cancer  Assessment and plan of treatment:	Please f/u with [facility]  Secondary Diagnosis:	Head ache  Goal:	treat  Assessment and plan of treatment:	RT -Oncology for radiation OUT PATIENT.  Oxy ir prn HA

## 2018-01-14 NOTE — PROGRESS NOTE ADULT - PROBLEM SELECTOR PLAN 2
- Widely metastatic as above, and also with enlargement of right lung mass with invasion of hilum as well as right adrenal metastasis on today's imaging. Though pt appears to have good performance status

## 2018-01-14 NOTE — DISCHARGE NOTE ADULT - PLAN OF CARE
Treat Please keep your appointment with RT for Brain mets RT Please f/u with [facility] treat RT -Oncology for radiation OUT PATIENT.  Oxy ir prn HA Please keep your appointment with RT for Brain mets RT. Radiation oncology will taper your decardon dose

## 2018-01-15 VITALS
RESPIRATION RATE: 18 BRPM | OXYGEN SATURATION: 100 % | HEART RATE: 91 BPM | TEMPERATURE: 98 F | SYSTOLIC BLOOD PRESSURE: 101 MMHG | DIASTOLIC BLOOD PRESSURE: 67 MMHG

## 2018-01-15 LAB
BASOPHILS # BLD AUTO: 0 K/UL — SIGNIFICANT CHANGE UP (ref 0–0.2)
BASOPHILS NFR BLD AUTO: 0 % — SIGNIFICANT CHANGE UP (ref 0–2)
BUN SERPL-MCNC: 7 MG/DL — SIGNIFICANT CHANGE UP (ref 7–23)
CALCIUM SERPL-MCNC: 8.3 MG/DL — LOW (ref 8.4–10.5)
CHLORIDE SERPL-SCNC: 94 MMOL/L — LOW (ref 98–107)
CO2 SERPL-SCNC: 24 MMOL/L — SIGNIFICANT CHANGE UP (ref 22–31)
CREAT SERPL-MCNC: 0.35 MG/DL — LOW (ref 0.5–1.3)
EOSINOPHIL # BLD AUTO: 0 K/UL — SIGNIFICANT CHANGE UP (ref 0–0.5)
EOSINOPHIL NFR BLD AUTO: 0 % — SIGNIFICANT CHANGE UP (ref 0–6)
GLUCOSE SERPL-MCNC: 158 MG/DL — HIGH (ref 70–99)
HCT VFR BLD CALC: 29.4 % — LOW (ref 34.5–45)
HGB BLD-MCNC: 9.1 G/DL — LOW (ref 11.5–15.5)
IMM GRANULOCYTES # BLD AUTO: 0.05 # — SIGNIFICANT CHANGE UP
IMM GRANULOCYTES NFR BLD AUTO: 0.7 % — SIGNIFICANT CHANGE UP (ref 0–1.5)
LYMPHOCYTES # BLD AUTO: 0.83 K/UL — LOW (ref 1–3.3)
LYMPHOCYTES # BLD AUTO: 11.9 % — LOW (ref 13–44)
MAGNESIUM SERPL-MCNC: 2 MG/DL — SIGNIFICANT CHANGE UP (ref 1.6–2.6)
MCHC RBC-ENTMCNC: 26 PG — LOW (ref 27–34)
MCHC RBC-ENTMCNC: 31 % — LOW (ref 32–36)
MCV RBC AUTO: 84 FL — SIGNIFICANT CHANGE UP (ref 80–100)
MONOCYTES # BLD AUTO: 0.34 K/UL — SIGNIFICANT CHANGE UP (ref 0–0.9)
MONOCYTES NFR BLD AUTO: 4.9 % — SIGNIFICANT CHANGE UP (ref 2–14)
NEUTROPHILS # BLD AUTO: 5.76 K/UL — SIGNIFICANT CHANGE UP (ref 1.8–7.4)
NEUTROPHILS NFR BLD AUTO: 82.5 % — HIGH (ref 43–77)
NRBC # FLD: 0.02 — SIGNIFICANT CHANGE UP
PHOSPHATE SERPL-MCNC: 3.3 MG/DL — SIGNIFICANT CHANGE UP (ref 2.5–4.5)
PLATELET # BLD AUTO: 379 K/UL — SIGNIFICANT CHANGE UP (ref 150–400)
PMV BLD: 9.8 FL — SIGNIFICANT CHANGE UP (ref 7–13)
POTASSIUM SERPL-MCNC: 4.8 MMOL/L — SIGNIFICANT CHANGE UP (ref 3.5–5.3)
POTASSIUM SERPL-SCNC: 4.8 MMOL/L — SIGNIFICANT CHANGE UP (ref 3.5–5.3)
RBC # BLD: 3.5 M/UL — LOW (ref 3.8–5.2)
RBC # FLD: 19.1 % — HIGH (ref 10.3–14.5)
SODIUM SERPL-SCNC: 133 MMOL/L — LOW (ref 135–145)
WBC # BLD: 6.98 K/UL — SIGNIFICANT CHANGE UP (ref 3.8–10.5)
WBC # FLD AUTO: 6.98 K/UL — SIGNIFICANT CHANGE UP (ref 3.8–10.5)

## 2018-01-15 PROCEDURE — 99239 HOSP IP/OBS DSCHRG MGMT >30: CPT

## 2018-01-15 RX ORDER — OMEPRAZOLE 10 MG/1
1 CAPSULE, DELAYED RELEASE ORAL
Qty: 30 | Refills: 0 | OUTPATIENT
Start: 2018-01-15 | End: 2018-02-13

## 2018-01-15 RX ORDER — OXYCODONE HYDROCHLORIDE 5 MG/1
1 TABLET ORAL
Qty: 42 | Refills: 0 | OUTPATIENT
Start: 2018-01-15 | End: 2018-01-21

## 2018-01-15 RX ORDER — DEXAMETHASONE 0.5 MG/5ML
1 ELIXIR ORAL
Qty: 30 | Refills: 0 | OUTPATIENT
Start: 2018-01-15 | End: 2018-01-24

## 2018-01-15 RX ORDER — PANTOPRAZOLE SODIUM 20 MG/1
1 TABLET, DELAYED RELEASE ORAL
Qty: 30 | Refills: 0 | OUTPATIENT
Start: 2018-01-15 | End: 2018-02-13

## 2018-01-15 RX ORDER — SENNA PLUS 8.6 MG/1
2 TABLET ORAL
Qty: 20 | Refills: 0 | OUTPATIENT
Start: 2018-01-15 | End: 2018-01-24

## 2018-01-15 RX ADMIN — POLYETHYLENE GLYCOL 3350 17 GRAM(S): 17 POWDER, FOR SOLUTION ORAL at 13:06

## 2018-01-15 RX ADMIN — Medication 100 MILLIGRAM(S): at 05:26

## 2018-01-15 RX ADMIN — OXYCODONE HYDROCHLORIDE 10 MILLIGRAM(S): 5 TABLET ORAL at 19:03

## 2018-01-15 RX ADMIN — Medication 100 MILLIGRAM(S): at 13:06

## 2018-01-15 RX ADMIN — OXYCODONE HYDROCHLORIDE 10 MILLIGRAM(S): 5 TABLET ORAL at 05:26

## 2018-01-15 RX ADMIN — Medication 200 MILLIGRAM(S): at 20:17

## 2018-01-15 RX ADMIN — Medication 4 MILLIGRAM(S): at 14:57

## 2018-01-15 RX ADMIN — OXYCODONE HYDROCHLORIDE 10 MILLIGRAM(S): 5 TABLET ORAL at 00:37

## 2018-01-15 RX ADMIN — Medication 200 MILLIGRAM(S): at 05:26

## 2018-01-15 RX ADMIN — OXYCODONE HYDROCHLORIDE 10 MILLIGRAM(S): 5 TABLET ORAL at 09:33

## 2018-01-15 RX ADMIN — OXYCODONE HYDROCHLORIDE 10 MILLIGRAM(S): 5 TABLET ORAL at 06:20

## 2018-01-15 RX ADMIN — OXYCODONE HYDROCHLORIDE 10 MILLIGRAM(S): 5 TABLET ORAL at 01:32

## 2018-01-15 RX ADMIN — OXYCODONE HYDROCHLORIDE 10 MILLIGRAM(S): 5 TABLET ORAL at 20:03

## 2018-01-15 RX ADMIN — OXYCODONE HYDROCHLORIDE 10 MILLIGRAM(S): 5 TABLET ORAL at 14:06

## 2018-01-15 RX ADMIN — OXYCODONE HYDROCHLORIDE 10 MILLIGRAM(S): 5 TABLET ORAL at 10:33

## 2018-01-15 RX ADMIN — OXYCODONE HYDROCHLORIDE 10 MILLIGRAM(S): 5 TABLET ORAL at 13:06

## 2018-01-15 RX ADMIN — Medication 4 MILLIGRAM(S): at 05:26

## 2018-01-15 RX ADMIN — PANTOPRAZOLE SODIUM 40 MILLIGRAM(S): 20 TABLET, DELAYED RELEASE ORAL at 06:34

## 2018-01-15 NOTE — PROGRESS NOTE ADULT - PROBLEM SELECTOR PLAN 1
- CT in the ED found new low attenuation lesions in the L frontal lobe and the R temporal lobe which likely represent metastatic disease.   - Continue decadron 4mg q6h  - F/u brain MRI read  - Request Radiation Medicine consult for possible whole brain RT
- CT in the ED found new low attenuation lesions in the L frontal lobe and the R temporal lobe which likely represent metastatic disease.   - Continue decadron 4mg q6h  - F/u brain MRI read  - Request Radiation Medicine consult for possible whole brain RT
- CT in the ED found new low attenuation lesions in the L frontal lobe and the R temporal lobe which likely represent metastatic disease.   - Continue decadron 4mg q6h but dec to 4 mg q8 and out aptient f/u  - F/u brain MRI read  - Request Radiation Medicine consult for possible whole brain RT
- CT in the ED found new low attenuation lesions in the L frontal lobe and the R temporal lobe which likely represent metastatic disease.   - Continue decadron 4mg q6h but dec to 4 mg q8 and out patient f/u On  - Radiation Medicine consult for possible whole brain RT- Out patient RT

## 2018-01-15 NOTE — PROGRESS NOTE ADULT - PROBLEM SELECTOR PLAN 5
improved with oxy ir at q4 prn
improved with oxy ir.
6/10. c/w Iv morohine.  Start Percocet 2 tabs q 6 prn mod to severe pain with iv morphine for breakthrough pain

## 2018-01-15 NOTE — PROGRESS NOTE ADULT - PROBLEM SELECTOR PLAN 4
- IMPROVE = 2 (active cancer). Holding pharmacologic VTE ppx due to brain metastases and risk of intracranial hemorrhage.

## 2018-01-15 NOTE — PROGRESS NOTE ADULT - PROBLEM SELECTOR PLAN 3
- Patient found to have a platelet count of 483 on presentation. Suspect this is likely reactionary from underlying progression of disease. Patient without signs or symptoms of infection currently. Will monitor patient off antibiotics.  - Monitor CBC
- stable

## 2018-01-15 NOTE — PROGRESS NOTE ADULT - ATTENDING COMMENTS
Now comfortable on Oxy ir 10 mg q4.  Ready for home with home PT. patient already has rolling walker.  CM to reinstate home services- AID at home.  d/c home today with out patient f/u Rt- Onc, Onc and Neuro-surgery.

## 2018-01-15 NOTE — PROGRESS NOTE ADULT - ASSESSMENT
57F with breast cancer w/ mets to bone, lung, brain, and lymph nodes (Dx 3/2014) s/p b/l mastectomy w/ LN dissection, chemotherapy (last in 3/2017), radiation therapy (gamma knife x 2), and 5 craniotomies (last in 10/2017 - Rt occipital) who presents with worsening headache, found to have new brain metastases in the left frontal lobe and right temporal lobe.  6/10 HA.  Pain improved on oxy ir 10 mg q64prn. Pain goes down to 2/10 after medication. Patient is comfortable.  Constipation- resolved  Planning for OUT PATIENT RT

## 2018-01-15 NOTE — PROGRESS NOTE ADULT - SUBJECTIVE AND OBJECTIVE BOX
Patient is a 57y old  Female who presents with a chief complaint of Headache sec to metastatic breast Ca to Brain. (14 Jan 2018 10:41)      SUBJECTIVE / OVERNIGHT EVENTS:  Patient notes Oxy Ir q4 hrs help her pain to be controlled as she is asking for the pain medication before the pain is >6/10 and she is more comfortable    MEDICATIONS  (STANDING):  dexamethasone     Tablet 4 milliGRAM(s) Oral every 8 hours  docusate sodium 100 milliGRAM(s) Oral three times a day  pantoprazole    Tablet 40 milliGRAM(s) Oral before breakfast  polyethylene glycol 3350 17 Gram(s) Oral daily  senna 2 Tablet(s) Oral at bedtime    MEDICATIONS  (PRN):  acetaminophen   Tablet. 650 milliGRAM(s) Oral every 6 hours PRN Mild Pain (1 - 3)  guaiFENesin   Syrup  (Sugar-Free) 200 milliGRAM(s) Oral every 6 hours PRN Cough  oxyCODONE    IR 10 milliGRAM(s) Oral every 4 hours PRN mod and severe pain    PHYSICAL EXAM:  Vital Signs Last 24 Hrs  T(C): 37.1 (15 Wili 2018 05:50), Max: 37.3 (14 Jan 2018 14:47)  T(F): 98.7 (15 Wili 2018 05:50), Max: 99.1 (14 Jan 2018 14:47)  HR: 69 (15 Wili 2018 05:50) (69 - 100)  BP: 115/74 (15 Wili 2018 05:50) (104/71 - 115/74)  BP(mean): --  RR: 18 (15 Wili 2018 05:50) (18 - 19)  SpO2: 98% (15 Wili 2018 05:50) (98% - 99%)  GENERAL: NAD, well-developed  HEAD:  Atraumatic, Normocephalic  EYES: EOMI, PERRLA, conjunctiva and sclera clear  NECK: Supple, No JVD  CHEST/LUNG: Clear to auscultation bilaterally; No wheeze  HEART: Regular rate and rhythm; No murmurs, rubs, or gallops  ABDOMEN: Soft, Nontender, Nondistended; Bowel sounds present  EXTREMITIES:  2+ Peripheral Pulses, No clubbing, cyanosis, or edema  PSYCH: AAOx3  NEUROLOGY: non-focal  SKIN: No rashes or lesions    LABS:                        9.1    6.98  )-----------( 379      ( 15 Wili 2018 05:45 )             29.4     01-15    133<L>  |  94<L>  |  7   ----------------------------<  158<H>  4.8   |  24  |  0.35<L>    Ca    8.3<L>      15 Wili 2018 05:45  Phos  3.3     01-15  Mg     2.0     01-15        RADIOLOGY & ADDITIONAL TESTS:    Imaging Personally Reviewed:    Consultant(s) Notes Reviewed:      Care Discussed with Consultants/Other Providers:  Onc, Rt- Onc

## 2018-01-19 ENCOUNTER — OUTPATIENT (OUTPATIENT)
Dept: OUTPATIENT SERVICES | Facility: HOSPITAL | Age: 58
LOS: 1 days | Discharge: HOME | End: 2018-01-19
Payer: MEDICAID

## 2018-01-19 DIAGNOSIS — Z90.13 ACQUIRED ABSENCE OF BILATERAL BREASTS AND NIPPLES: Chronic | ICD-10-CM

## 2018-01-19 DIAGNOSIS — Z98.890 OTHER SPECIFIED POSTPROCEDURAL STATES: Chronic | ICD-10-CM

## 2018-01-19 PROCEDURE — 77290 THER RAD SIMULAJ FIELD CPLX: CPT | Mod: 26

## 2018-01-19 PROCEDURE — 77334 RADIATION TREATMENT AID(S): CPT | Mod: 26

## 2018-01-19 RX ORDER — POLYETHYLENE GLYCOL 3350 17 G/17G
17 POWDER, FOR SOLUTION ORAL
Qty: 1 | Refills: 0 | Status: ACTIVE | COMMUNITY
Start: 2018-01-19 | End: 1900-01-01

## 2018-01-19 RX ORDER — OXYCODONE HYDROCHLORIDE 20 MG/1
20 TABLET, FILM COATED, EXTENDED RELEASE ORAL
Qty: 60 | Refills: 0 | Status: ACTIVE | COMMUNITY
Start: 2018-01-19

## 2018-01-22 PROCEDURE — 77307 TELETHX ISODOSE PLAN CPLX: CPT | Mod: 26

## 2018-01-22 PROCEDURE — 77263 THER RADIOLOGY TX PLNG CPLX: CPT

## 2018-01-22 PROCEDURE — 77334 RADIATION TREATMENT AID(S): CPT | Mod: 26

## 2018-01-24 PROCEDURE — 77280 THER RAD SIMULAJ FIELD SMPL: CPT | Mod: 26

## 2018-01-26 RX ORDER — DEXAMETHASONE 4 MG/1
4 TABLET ORAL EVERY 8 HOURS
Qty: 30 | Refills: 0 | Status: ACTIVE | COMMUNITY
Start: 2018-01-26 | End: 1900-01-01

## 2018-01-29 ENCOUNTER — INPATIENT (INPATIENT)
Facility: HOSPITAL | Age: 58
LOS: 4 days | Discharge: HOSPICE | End: 2018-02-03
Attending: INTERNAL MEDICINE | Admitting: INTERNAL MEDICINE
Payer: MEDICAID

## 2018-01-29 VITALS
OXYGEN SATURATION: 98 % | TEMPERATURE: 99 F | HEART RATE: 106 BPM | SYSTOLIC BLOOD PRESSURE: 125 MMHG | DIASTOLIC BLOOD PRESSURE: 79 MMHG | RESPIRATION RATE: 18 BRPM

## 2018-01-29 VITALS
RESPIRATION RATE: 13 BRPM | SYSTOLIC BLOOD PRESSURE: 109 MMHG | TEMPERATURE: 97.88 F | DIASTOLIC BLOOD PRESSURE: 74 MMHG | HEART RATE: 106 BPM | OXYGEN SATURATION: 94 %

## 2018-01-29 DIAGNOSIS — Z90.13 ACQUIRED ABSENCE OF BILATERAL BREASTS AND NIPPLES: Chronic | ICD-10-CM

## 2018-01-29 DIAGNOSIS — Z98.890 OTHER SPECIFIED POSTPROCEDURAL STATES: Chronic | ICD-10-CM

## 2018-01-29 LAB
ALBUMIN SERPL ELPH-MCNC: 3.5 G/DL — SIGNIFICANT CHANGE UP (ref 3.3–5)
ALP SERPL-CCNC: 100 U/L — SIGNIFICANT CHANGE UP (ref 40–120)
ALT FLD-CCNC: 23 U/L — SIGNIFICANT CHANGE UP (ref 4–33)
AST SERPL-CCNC: 19 U/L — SIGNIFICANT CHANGE UP (ref 4–32)
BASOPHILS # BLD AUTO: 0.01 K/UL — SIGNIFICANT CHANGE UP (ref 0–0.2)
BASOPHILS NFR BLD AUTO: 0.1 % — SIGNIFICANT CHANGE UP (ref 0–2)
BILIRUB SERPL-MCNC: 0.8 MG/DL — SIGNIFICANT CHANGE UP (ref 0.2–1.2)
BUN SERPL-MCNC: 19 MG/DL — SIGNIFICANT CHANGE UP (ref 7–23)
CA-I BLD-SCNC: 1.05 MMOL/L — SIGNIFICANT CHANGE UP (ref 1.03–1.23)
CALCIUM SERPL-MCNC: 9 MG/DL — SIGNIFICANT CHANGE UP (ref 8.4–10.5)
CHLORIDE SERPL-SCNC: 91 MMOL/L — LOW (ref 98–107)
CO2 SERPL-SCNC: 21 MMOL/L — LOW (ref 22–31)
CREAT SERPL-MCNC: 0.33 MG/DL — LOW (ref 0.5–1.3)
EOSINOPHIL # BLD AUTO: 0 K/UL — SIGNIFICANT CHANGE UP (ref 0–0.5)
EOSINOPHIL NFR BLD AUTO: 0 % — SIGNIFICANT CHANGE UP (ref 0–6)
GLUCOSE SERPL-MCNC: 121 MG/DL — HIGH (ref 70–99)
HCT VFR BLD CALC: 38.4 % — SIGNIFICANT CHANGE UP (ref 34.5–45)
HGB BLD-MCNC: 12 G/DL — SIGNIFICANT CHANGE UP (ref 11.5–15.5)
IMM GRANULOCYTES # BLD AUTO: 0.11 # — SIGNIFICANT CHANGE UP
IMM GRANULOCYTES NFR BLD AUTO: 0.9 % — SIGNIFICANT CHANGE UP (ref 0–1.5)
LYMPHOCYTES # BLD AUTO: 0.77 K/UL — LOW (ref 1–3.3)
LYMPHOCYTES # BLD AUTO: 6.3 % — LOW (ref 13–44)
MCHC RBC-ENTMCNC: 26.2 PG — LOW (ref 27–34)
MCHC RBC-ENTMCNC: 31.3 % — LOW (ref 32–36)
MCV RBC AUTO: 83.8 FL — SIGNIFICANT CHANGE UP (ref 80–100)
MONOCYTES # BLD AUTO: 0.56 K/UL — SIGNIFICANT CHANGE UP (ref 0–0.9)
MONOCYTES NFR BLD AUTO: 4.6 % — SIGNIFICANT CHANGE UP (ref 2–14)
NEUTROPHILS # BLD AUTO: 10.71 K/UL — HIGH (ref 1.8–7.4)
NEUTROPHILS NFR BLD AUTO: 88.1 % — HIGH (ref 43–77)
NRBC # FLD: 0 — SIGNIFICANT CHANGE UP
PLATELET # BLD AUTO: 266 K/UL — SIGNIFICANT CHANGE UP (ref 150–400)
PMV BLD: 9.4 FL — SIGNIFICANT CHANGE UP (ref 7–13)
POTASSIUM SERPL-MCNC: 4.7 MMOL/L — SIGNIFICANT CHANGE UP (ref 3.5–5.3)
POTASSIUM SERPL-SCNC: 4.7 MMOL/L — SIGNIFICANT CHANGE UP (ref 3.5–5.3)
PROT SERPL-MCNC: 7.2 G/DL — SIGNIFICANT CHANGE UP (ref 6–8.3)
RBC # BLD: 4.58 M/UL — SIGNIFICANT CHANGE UP (ref 3.8–5.2)
RBC # FLD: 19.9 % — HIGH (ref 10.3–14.5)
SODIUM SERPL-SCNC: 131 MMOL/L — LOW (ref 135–145)
WBC # BLD: 12.16 K/UL — HIGH (ref 3.8–10.5)
WBC # FLD AUTO: 12.16 K/UL — HIGH (ref 3.8–10.5)

## 2018-01-29 PROCEDURE — 74019 RADEX ABDOMEN 2 VIEWS: CPT | Mod: 26

## 2018-01-29 PROCEDURE — 71046 X-RAY EXAM CHEST 2 VIEWS: CPT | Mod: 26

## 2018-01-29 RX ORDER — SENNA PLUS 8.6 MG/1
1 TABLET ORAL ONCE
Qty: 0 | Refills: 0 | Status: COMPLETED | OUTPATIENT
Start: 2018-01-29 | End: 2018-01-29

## 2018-01-29 RX ORDER — SODIUM CHLORIDE 9 MG/ML
1000 INJECTION INTRAMUSCULAR; INTRAVENOUS; SUBCUTANEOUS ONCE
Qty: 0 | Refills: 0 | Status: COMPLETED | OUTPATIENT
Start: 2018-01-29 | End: 2018-01-29

## 2018-01-29 RX ORDER — MORPHINE SULFATE 50 MG/1
4 CAPSULE, EXTENDED RELEASE ORAL ONCE
Qty: 0 | Refills: 0 | Status: DISCONTINUED | OUTPATIENT
Start: 2018-01-29 | End: 2018-01-29

## 2018-01-29 RX ORDER — DOCUSATE SODIUM 100 MG
100 CAPSULE ORAL ONCE
Qty: 0 | Refills: 0 | Status: COMPLETED | OUTPATIENT
Start: 2018-01-29 | End: 2018-01-29

## 2018-01-29 RX ORDER — DEXAMETHASONE 0.5 MG/5ML
4 ELIXIR ORAL ONCE
Qty: 0 | Refills: 0 | Status: DISCONTINUED | OUTPATIENT
Start: 2018-01-29 | End: 2018-01-29

## 2018-01-29 RX ORDER — DEXAMETHASONE 0.5 MG/5ML
4 ELIXIR ORAL ONCE
Qty: 0 | Refills: 0 | Status: COMPLETED | OUTPATIENT
Start: 2018-01-29 | End: 2018-01-29

## 2018-01-29 RX ADMIN — SODIUM CHLORIDE 1000 MILLILITER(S): 9 INJECTION INTRAMUSCULAR; INTRAVENOUS; SUBCUTANEOUS at 23:50

## 2018-01-29 RX ADMIN — MORPHINE SULFATE 4 MILLIGRAM(S): 50 CAPSULE, EXTENDED RELEASE ORAL at 23:22

## 2018-01-29 RX ADMIN — SENNA PLUS 1 TABLET(S): 8.6 TABLET ORAL at 23:49

## 2018-01-29 RX ADMIN — Medication 100 MILLIGRAM(S): at 23:49

## 2018-01-29 RX ADMIN — MORPHINE SULFATE 4 MILLIGRAM(S): 50 CAPSULE, EXTENDED RELEASE ORAL at 22:50

## 2018-01-29 NOTE — ED PROVIDER NOTE - ATTENDING CONTRIBUTION TO CARE
[place]  pt with breast CA  brain mets  poor po intake and constipation on oxycontin at home  pt with soft nontender abd  no hernia felt   moving all ext well  lungs  sat 100 RA  soft wheezes  r>L  CXR  mass rt hilum   otherwise nl cardiac size  no other lung findings    Plan  check labs  AXR    constipation likely from meds  with poor po intake  dehydrated appearance  will likely needd admission

## 2018-01-29 NOTE — ED PROVIDER NOTE - MEDICAL DECISION MAKING DETAILS
57f w metastatic breast ca incl brain and lung mets here w acute on chronic headache. Non-focal neuro, pain likely related to progressive metastatic dz. Will check basic labs, cxr, abd xr, pain ctrl, reassess 57f w metastatic breast ca incl brain and lung mets here w acute on chronic headache. Non-focal neuro, pain likely related to progressive metastatic dz. Abd soft, low concern for obstruction, will not image at this time. Will check basic labs, cxr, abd xr, pain ctrl, hydration, senna colace, reassess

## 2018-01-29 NOTE — ED ADULT TRIAGE NOTE - CHIEF COMPLAINT QUOTE
pt BIBA from cancer center, pt c/o weakness and constipation x 10 days.  pt has PMH:  breast ca and brain ca

## 2018-01-29 NOTE — ED PROVIDER NOTE - OBJECTIVE STATEMENT
57fw breast ca metastatic t o brain, bone, lungs, nodse s/p chemo (most recent March '16), radiation actively (most recent today), s/p 5 craniotomies, here today for headache. Was just admitted for headache 1/11-1/15 w pain ctrl and d/c'd w rec for o/p radiation treatment. Says pain is similar to prior just more severe. Has been taking oxycontin q12 at home, and receiving r 57fw breast ca metastatic t o brain, bone, lungs, nodse s/p chemo (most recent March '16), radiation actively (most recent today), s/p 5 craniotomies, here today for headache. Was just admitted for headache 1/11-1/15 w pain ctrl and d/c'd w rec for o/p radiation treatment. Says pain is similar to prior just more severe. Has been taking oxycontin q12 at home, and receiving active radiation. No visual changes, no focal neuro complaints. Had nausea post radiation but none since, no vomiting. Also c/o constipation x 10 days, has been passing flatus, no hx abd surg. 57fw breast ca metastatic t o brain, bone, lungs, nodes s/p chemo (most recent March '16), radiation actively (most recent today), s/p 5 craniotomies, here today for headache. Was just admitted for headache 1/11-1/15 w pain ctrl and d/c'd w rec for o/p radiation treatment. Says pain is similar to prior just more severe. Has been taking oxycontin q12 at home, and receiving active radiation. No visual changes, no focal neuro complaints. Had nausea post radiation but none since, no vomiting. Also c/o constipation and abd cramping x 10 days, has been passing flatus, no hx abd surg. Has been unable to eat.

## 2018-01-29 NOTE — ED ADULT NURSE NOTE - OBJECTIVE STATEMENT
ben RN: pt received to room 6 pt comes to ED for HA and constipation pt is a breast CA with L arm precautions  and ben RN: pt received to room 6 pt comes to ED for HA and constipation pt is a breast CA with L arm precautions  and brain CA. pt has Large cyst like mass to the back of her head. pt has a productive cough. labs were drawn and sent unable to get IV access at this time rpt given to primary rn

## 2018-01-29 NOTE — ED PROVIDER NOTE - CONSTITUTIONAL DISTRESS
VACCINE ADMINISTRATION RECORD  PARENT / GUARDIAN APPROVAL  Date: 2024  Vaccine administered to: Elizabeth Godoy     : 1991    MRN: [id number]    A copy of the appropriate Centers for Disease Control and Prevention Vaccine Information statement has been provided. I have read or have had explained the information about the diseases and the vaccines listed below. There was an opportunity to ask questions and any questions were answered satisfactorily. I believe that I understand the benefits and risks of the vaccine cited and ask that the vaccine(s) listed below be given to me or to the person named above (for whom I am authorized to make this request).    VACCINES ADMINISTERED:  HPV    I have read and hereby agree to be bound by the terms of this agreement as stated above. My signature is valid until revoked by me in writing.  This document is signed by self, relationship: Self on 2024.:                                                                                                                                         Parent / Guardian Signature                                                Date    
MILD

## 2018-01-30 DIAGNOSIS — K59.03 DRUG INDUCED CONSTIPATION: ICD-10-CM

## 2018-01-30 DIAGNOSIS — R62.7 ADULT FAILURE TO THRIVE: ICD-10-CM

## 2018-01-30 DIAGNOSIS — Z29.9 ENCOUNTER FOR PROPHYLACTIC MEASURES, UNSPECIFIED: ICD-10-CM

## 2018-01-30 DIAGNOSIS — R63.0 ANOREXIA: ICD-10-CM

## 2018-01-30 DIAGNOSIS — R51 HEADACHE: ICD-10-CM

## 2018-01-30 LAB
BUN SERPL-MCNC: 14 MG/DL — SIGNIFICANT CHANGE UP (ref 7–23)
CALCIUM SERPL-MCNC: 7.9 MG/DL — LOW (ref 8.4–10.5)
CHLORIDE SERPL-SCNC: 95 MMOL/L — LOW (ref 98–107)
CO2 SERPL-SCNC: 27 MMOL/L — SIGNIFICANT CHANGE UP (ref 22–31)
CREAT SERPL-MCNC: 0.21 MG/DL — LOW (ref 0.5–1.3)
FOLATE SERPL-MCNC: 7.1 NG/ML — SIGNIFICANT CHANGE UP (ref 4.7–20)
GLUCOSE SERPL-MCNC: 135 MG/DL — HIGH (ref 70–99)
MAGNESIUM SERPL-MCNC: 2.1 MG/DL — SIGNIFICANT CHANGE UP (ref 1.6–2.6)
PHOSPHATE SERPL-MCNC: 1.6 MG/DL — LOW (ref 2.5–4.5)
POTASSIUM SERPL-MCNC: 4.3 MMOL/L — SIGNIFICANT CHANGE UP (ref 3.5–5.3)
POTASSIUM SERPL-SCNC: 4.3 MMOL/L — SIGNIFICANT CHANGE UP (ref 3.5–5.3)
SODIUM SERPL-SCNC: 132 MMOL/L — LOW (ref 135–145)
TSH SERPL-MCNC: 1.31 UIU/ML — SIGNIFICANT CHANGE UP (ref 0.27–4.2)
VIT B12 SERPL-MCNC: 217 PG/ML — SIGNIFICANT CHANGE UP (ref 200–900)

## 2018-01-30 PROCEDURE — 12345: CPT | Mod: NC

## 2018-01-30 PROCEDURE — 99223 1ST HOSP IP/OBS HIGH 75: CPT | Mod: GC

## 2018-01-30 RX ORDER — DEXAMETHASONE 0.5 MG/5ML
4 ELIXIR ORAL EVERY 8 HOURS
Qty: 0 | Refills: 0 | Status: DISCONTINUED | OUTPATIENT
Start: 2018-01-30 | End: 2018-02-03

## 2018-01-30 RX ORDER — MORPHINE SULFATE 50 MG/1
2 CAPSULE, EXTENDED RELEASE ORAL EVERY 4 HOURS
Qty: 0 | Refills: 0 | Status: DISCONTINUED | OUTPATIENT
Start: 2018-01-30 | End: 2018-01-31

## 2018-01-30 RX ORDER — MORPHINE SULFATE 50 MG/1
4 CAPSULE, EXTENDED RELEASE ORAL EVERY 4 HOURS
Qty: 0 | Refills: 0 | Status: DISCONTINUED | OUTPATIENT
Start: 2018-01-30 | End: 2018-01-31

## 2018-01-30 RX ORDER — SENNA PLUS 8.6 MG/1
2 TABLET ORAL AT BEDTIME
Qty: 0 | Refills: 0 | Status: DISCONTINUED | OUTPATIENT
Start: 2018-01-30 | End: 2018-02-03

## 2018-01-30 RX ORDER — MORPHINE SULFATE 50 MG/1
4 CAPSULE, EXTENDED RELEASE ORAL EVERY 6 HOURS
Qty: 0 | Refills: 0 | Status: DISCONTINUED | OUTPATIENT
Start: 2018-01-30 | End: 2018-01-30

## 2018-01-30 RX ORDER — LACTULOSE 10 G/15ML
10 SOLUTION ORAL ONCE
Qty: 0 | Refills: 0 | Status: COMPLETED | OUTPATIENT
Start: 2018-01-30 | End: 2018-01-30

## 2018-01-30 RX ORDER — MORPHINE SULFATE 50 MG/1
2 CAPSULE, EXTENDED RELEASE ORAL EVERY 6 HOURS
Qty: 0 | Refills: 0 | Status: DISCONTINUED | OUTPATIENT
Start: 2018-01-30 | End: 2018-01-30

## 2018-01-30 RX ORDER — MORPHINE SULFATE 50 MG/1
4 CAPSULE, EXTENDED RELEASE ORAL ONCE
Qty: 0 | Refills: 0 | Status: DISCONTINUED | OUTPATIENT
Start: 2018-01-30 | End: 2018-01-30

## 2018-01-30 RX ORDER — PANTOPRAZOLE SODIUM 20 MG/1
40 TABLET, DELAYED RELEASE ORAL
Qty: 0 | Refills: 0 | Status: DISCONTINUED | OUTPATIENT
Start: 2018-01-30 | End: 2018-02-03

## 2018-01-30 RX ORDER — POLYETHYLENE GLYCOL 3350 17 G/17G
17 POWDER, FOR SOLUTION ORAL DAILY
Qty: 0 | Refills: 0 | Status: DISCONTINUED | OUTPATIENT
Start: 2018-01-30 | End: 2018-02-03

## 2018-01-30 RX ORDER — SODIUM CHLORIDE 9 MG/ML
1000 INJECTION INTRAMUSCULAR; INTRAVENOUS; SUBCUTANEOUS
Qty: 0 | Refills: 0 | Status: DISCONTINUED | OUTPATIENT
Start: 2018-01-30 | End: 2018-01-31

## 2018-01-30 RX ORDER — DOCUSATE SODIUM 100 MG
100 CAPSULE ORAL THREE TIMES A DAY
Qty: 0 | Refills: 0 | Status: DISCONTINUED | OUTPATIENT
Start: 2018-01-30 | End: 2018-02-03

## 2018-01-30 RX ADMIN — MORPHINE SULFATE 4 MILLIGRAM(S): 50 CAPSULE, EXTENDED RELEASE ORAL at 19:43

## 2018-01-30 RX ADMIN — MORPHINE SULFATE 4 MILLIGRAM(S): 50 CAPSULE, EXTENDED RELEASE ORAL at 06:05

## 2018-01-30 RX ADMIN — LACTULOSE 10 GRAM(S): 10 SOLUTION ORAL at 07:39

## 2018-01-30 RX ADMIN — Medication 4 MILLIGRAM(S): at 08:59

## 2018-01-30 RX ADMIN — MORPHINE SULFATE 4 MILLIGRAM(S): 50 CAPSULE, EXTENDED RELEASE ORAL at 14:15

## 2018-01-30 RX ADMIN — MORPHINE SULFATE 4 MILLIGRAM(S): 50 CAPSULE, EXTENDED RELEASE ORAL at 01:22

## 2018-01-30 RX ADMIN — PANTOPRAZOLE SODIUM 40 MILLIGRAM(S): 20 TABLET, DELAYED RELEASE ORAL at 05:48

## 2018-01-30 RX ADMIN — POLYETHYLENE GLYCOL 3350 17 GRAM(S): 17 POWDER, FOR SOLUTION ORAL at 11:51

## 2018-01-30 RX ADMIN — MORPHINE SULFATE 4 MILLIGRAM(S): 50 CAPSULE, EXTENDED RELEASE ORAL at 19:03

## 2018-01-30 RX ADMIN — Medication 4 MILLIGRAM(S): at 23:40

## 2018-01-30 RX ADMIN — Medication 10 MILLIGRAM(S): at 14:12

## 2018-01-30 RX ADMIN — SODIUM CHLORIDE 100 MILLILITER(S): 9 INJECTION INTRAMUSCULAR; INTRAVENOUS; SUBCUTANEOUS at 04:19

## 2018-01-30 RX ADMIN — MORPHINE SULFATE 4 MILLIGRAM(S): 50 CAPSULE, EXTENDED RELEASE ORAL at 14:01

## 2018-01-30 RX ADMIN — Medication 4 MILLIGRAM(S): at 00:09

## 2018-01-30 RX ADMIN — Medication 100 MILLIGRAM(S): at 14:06

## 2018-01-30 RX ADMIN — SODIUM CHLORIDE 100 MILLILITER(S): 9 INJECTION INTRAMUSCULAR; INTRAVENOUS; SUBCUTANEOUS at 08:59

## 2018-01-30 RX ADMIN — MORPHINE SULFATE 4 MILLIGRAM(S): 50 CAPSULE, EXTENDED RELEASE ORAL at 05:48

## 2018-01-30 RX ADMIN — Medication 100 MILLIGRAM(S): at 05:48

## 2018-01-30 RX ADMIN — Medication 4 MILLIGRAM(S): at 17:07

## 2018-01-30 NOTE — H&P ADULT - PROBLEM SELECTOR PLAN 1
2/2 stage IV breast cancer metastasis   - Will treat with morphine 4mg q4hr for severe pain and morphine 2mg q4hrs for moderate pain   - Continue decadron 4mg q8hrs   - Will need to touch base with radiation oncology team in AM to arrange to treatment today 2/2 stage IV breast cancer metastasis   - Will treat with morphine 4mg q4hr for severe pain and morphine 2mg q4hrs for moderate pain   - Continue decadron 4mg q8hrs with PPI  - Will need to touch base with radiation oncology team in AM to arrange to treatment today  - Fall, aspiration, seizure precautions

## 2018-01-30 NOTE — H&P ADULT - ASSESSMENT
56 yo Female with hx of breast cancer stage IV s/p craniotomy x5, chemo, currently on radiation presenting with headaches 2/2 mets

## 2018-01-30 NOTE — PROGRESS NOTE ADULT - SUBJECTIVE AND OBJECTIVE BOX
Patient with history of breast cancer, now metastatic to brain and scalp/brain dural mass. Being treated outpatient with whole brain radiation. Evaluated yesterday by outpatient team, who felt she should be sent to ER due to constipation for 10 days and increasing pain form her scalp mass, which is enlarging.     Depending on how long inpatient team plans on keeping her in house, will consider continuing her radiation while inpatient. If plan is to discharge today or tomorrow, would prefer to continue radiation outpatient.     No need for further consult, rad onc will continue to follow. Would appreciate input from primary team on how long they expect to keep her inpatient.  Please call [phone].

## 2018-01-30 NOTE — H&P ADULT - PROBLEM SELECTOR PLAN 3
hypochloremia and hyponatremia   - Will given IV maintenance fluids   - TSH, folate, B12 pending given vegan diet

## 2018-01-30 NOTE — PHYSICAL THERAPY INITIAL EVALUATION ADULT - DISCHARGE DISPOSITION, PT EVAL
Pt appears she may be close to functional baseline; will attempt to see 1-2x more session to see if pt can progress functionally/home/no skilled PT needs

## 2018-01-30 NOTE — H&P ADULT - HISTORY OF PRESENT ILLNESS
56 yo Female with hx of breast cancer stage IV s/p craniotomy x5, chemo, currently on radiation presenting with headaches. Headache is located at the site of the mass on the right parietotemporal region. Denies associated blurry vision, nausea, vomiting. Although reports having nausea after her fourth (out of ten) radiation treatment today. Patient was recently in the ED two weeks ago for pain management for headache. MRI Head at that time showed increased parenchymal metastatic disease. Patient takes Oxycontin BID for pain control (was previously on oxycodone which did not control the pain and resulted in dizziness and weakness). Patient has not have a bowel movement in 10 days. She feels occasional abdominal cramping. Is passing gas. Patient has decreased appetite, but reports it is coming back today.     In the ED, T98.8  /79 RR 18 SpO2 98 RA. Patient received morphine 4mg IV x2, senna, docusate, dexamethasone 4mg IV, and 1L bolus. Patient reports she feels more relaxed with the pain medications she has received

## 2018-01-30 NOTE — H&P ADULT - ATTENDING COMMENTS
56 y/o female HX of Breast CA stage 4 with mets to [place], Bone, Lungs on RTX, Anemia, + weakness, + HA, A+O x 3, + Constipation x 10 days?, passes gas, + Cough, NO CP, NO SOB, no vomit, decreased po intake, failure to thrive, Na 131, on PO Decadron,     Fall/seizure/aspiration precaution, Venodyne for DVT Prophylaxis, IVF NS @ 100 cc/hr for now  x 10 HOURS, IV Morphine PRN for pain, Colace, Senna, Lactulose , Miralax, Decadron PO, Protonix, HEM ONC Consult, Radiation ONCOLOGY  consult, F/U CBC, BMP, TSH   Case D/W Pt, HS, ADS, Nursing    Pt was seen by me, Dr. DANNY Vinson on 1/30/18.

## 2018-01-30 NOTE — CHART NOTE - NSCHARTNOTEFT_GEN_A_CORE
56 y/o F w/ a PMHx of breast cancer w/ mets to bone, lung, brain, and lymph nodes (Dx 3/2014) s/p b/l mastectomy w/ LN dissection, chemotherapy (last in 3/2017), radiation therapy, and 5 craniotomies (last in 10/2017 - Rt occipital) who presents to the ED with worsening headache and constipation. Bowel regimen pain control and rad Onc treatment tomm. Heme/Onc consult-[place]

## 2018-01-30 NOTE — H&P ADULT - NSHPPHYSICALEXAM_GEN_ALL_CORE
PHYSICAL EXAM:  GENERAL: NAD, well-groomed, well-developed  HEAD:  right temporoparietal mass- 2inch by 2inch  EYES: EOMI, conjunctiva and sclera clear  CHEST/LUNG: Clear to percussion bilaterally; No rales, rhonchi, wheezing, or rubs  HEART: Regular rate and rhythm; No murmurs, rubs, or gallops  ABDOMEN: discomfort to palpation, Nondistended; hypoactive bowel sounds  EXTREMITIES:  2+ Peripheral Pulses, No clubbing, cyanosis, or edema  SKIN: No rashes or lesions  NERVOUS SYSTEM:  Alert & Oriented X3, Good concentration  PSYCH: appropriate mood and affect

## 2018-01-30 NOTE — H&P ADULT - NSHPREVIEWOFSYSTEMS_GEN_ALL_CORE
REVIEW OF SYSTEMS:  CONSTITUTIONAL: No fever. fatigue  EYES: No eye pain, visual disturbances, or discharge  ENMT:  No sinus or throat pain  RESPIRATORY: chronic cough. No shortness of breath  CARDIOVASCULAR: No chest pain, palpitations  GASTROINTESTINAL: abdominal pain. No nausea, vomiting. constipation.  GENITOURINARY: No dysuria, frequency  NEUROLOGICAL: headaches, tremors  SKIN: No itching, burning, rashes, or lesions   MUSCULOSKELETAL: diffuse body pain  HEME/LYMPH: No easy bruising, or bleeding gums

## 2018-01-30 NOTE — H&P ADULT - NSHPLABSRESULTS_GEN_ALL_CORE
12.0   12.16 )-----------( 266      ( 29 Jan 2018 21:43 )             38.4     01-29    131<L>  |  91<L>  |  19  ----------------------------<  121<H>  4.7   |  21<L>  |  0.33<L>    Ca    9.0      29 Jan 2018 21:43    TPro  7.2  /  Alb  3.5  /  TBili  0.8  /  DBili  x   /  AST  19  /  ALT  23  /  AlkPhos  100  01-29    < from: Xray Abdomen 2 Views (01.29.18 @ 22:22) >    INTERPRETATION:  no emergent findings    < end of copied text >    < from: Xray Chest 2 Views PA/Lat (01.29.18 @ 22:22) >    INTERPRETATION:  Right perihilar mass corresponding to known right upper   lobe mass noted on CT dated 1/11/2018.    < end of copied text >

## 2018-01-31 ENCOUNTER — OUTPATIENT (OUTPATIENT)
Dept: OUTPATIENT SERVICES | Facility: HOSPITAL | Age: 58
LOS: 1 days | Discharge: HOME | End: 2018-01-31

## 2018-01-31 DIAGNOSIS — R69 ILLNESS, UNSPECIFIED: ICD-10-CM

## 2018-01-31 DIAGNOSIS — Z90.13 ACQUIRED ABSENCE OF BILATERAL BREASTS AND NIPPLES: Chronic | ICD-10-CM

## 2018-01-31 DIAGNOSIS — G89.3 NEOPLASM RELATED PAIN (ACUTE) (CHRONIC): ICD-10-CM

## 2018-01-31 DIAGNOSIS — C50.919 MALIGNANT NEOPLASM OF UNSPECIFIED SITE OF UNSPECIFIED FEMALE BREAST: ICD-10-CM

## 2018-01-31 DIAGNOSIS — Z51.5 ENCOUNTER FOR PALLIATIVE CARE: ICD-10-CM

## 2018-01-31 DIAGNOSIS — R53.81 OTHER MALAISE: ICD-10-CM

## 2018-01-31 DIAGNOSIS — K59.00 CONSTIPATION, UNSPECIFIED: ICD-10-CM

## 2018-01-31 DIAGNOSIS — C79.31 SECONDARY MALIGNANT NEOPLASM OF BRAIN: ICD-10-CM

## 2018-01-31 DIAGNOSIS — Z98.890 OTHER SPECIFIED POSTPROCEDURAL STATES: Chronic | ICD-10-CM

## 2018-01-31 LAB
BASOPHILS # BLD AUTO: 0 K/UL — SIGNIFICANT CHANGE UP (ref 0–0.2)
BASOPHILS NFR BLD AUTO: 0 % — SIGNIFICANT CHANGE UP (ref 0–2)
BUN SERPL-MCNC: 7 MG/DL — SIGNIFICANT CHANGE UP (ref 7–23)
CALCIUM SERPL-MCNC: 8.2 MG/DL — LOW (ref 8.4–10.5)
CHLORIDE SERPL-SCNC: 96 MMOL/L — LOW (ref 98–107)
CO2 SERPL-SCNC: 24 MMOL/L — SIGNIFICANT CHANGE UP (ref 22–31)
CREAT SERPL-MCNC: 0.24 MG/DL — LOW (ref 0.5–1.3)
EOSINOPHIL # BLD AUTO: 0 K/UL — SIGNIFICANT CHANGE UP (ref 0–0.5)
EOSINOPHIL NFR BLD AUTO: 0 % — SIGNIFICANT CHANGE UP (ref 0–6)
GLUCOSE SERPL-MCNC: 121 MG/DL — HIGH (ref 70–99)
HCT VFR BLD CALC: 32 % — LOW (ref 34.5–45)
HGB BLD-MCNC: 9.7 G/DL — LOW (ref 11.5–15.5)
IMM GRANULOCYTES # BLD AUTO: 0.06 # — SIGNIFICANT CHANGE UP
IMM GRANULOCYTES NFR BLD AUTO: 0.6 % — SIGNIFICANT CHANGE UP (ref 0–1.5)
LYMPHOCYTES # BLD AUTO: 0.55 K/UL — LOW (ref 1–3.3)
LYMPHOCYTES # BLD AUTO: 5.7 % — LOW (ref 13–44)
MAGNESIUM SERPL-MCNC: 2.1 MG/DL — SIGNIFICANT CHANGE UP (ref 1.6–2.6)
MCHC RBC-ENTMCNC: 25.9 PG — LOW (ref 27–34)
MCHC RBC-ENTMCNC: 30.3 % — LOW (ref 32–36)
MCV RBC AUTO: 85.3 FL — SIGNIFICANT CHANGE UP (ref 80–100)
MONOCYTES # BLD AUTO: 0.45 K/UL — SIGNIFICANT CHANGE UP (ref 0–0.9)
MONOCYTES NFR BLD AUTO: 4.6 % — SIGNIFICANT CHANGE UP (ref 2–14)
NEUTROPHILS # BLD AUTO: 8.63 K/UL — HIGH (ref 1.8–7.4)
NEUTROPHILS NFR BLD AUTO: 89.1 % — HIGH (ref 43–77)
NRBC # FLD: 0 — SIGNIFICANT CHANGE UP
PHOSPHATE SERPL-MCNC: 1.3 MG/DL — LOW (ref 2.5–4.5)
PLATELET # BLD AUTO: 272 K/UL — SIGNIFICANT CHANGE UP (ref 150–400)
PMV BLD: 9.8 FL — SIGNIFICANT CHANGE UP (ref 7–13)
POTASSIUM SERPL-MCNC: 4.3 MMOL/L — SIGNIFICANT CHANGE UP (ref 3.5–5.3)
POTASSIUM SERPL-SCNC: 4.3 MMOL/L — SIGNIFICANT CHANGE UP (ref 3.5–5.3)
RBC # BLD: 3.75 M/UL — LOW (ref 3.8–5.2)
RBC # FLD: 19.9 % — HIGH (ref 10.3–14.5)
SODIUM SERPL-SCNC: 133 MMOL/L — LOW (ref 135–145)
WBC # BLD: 9.69 K/UL — SIGNIFICANT CHANGE UP (ref 3.8–10.5)
WBC # FLD AUTO: 9.69 K/UL — SIGNIFICANT CHANGE UP (ref 3.8–10.5)

## 2018-01-31 PROCEDURE — 77280 THER RAD SIMULAJ FIELD SMPL: CPT | Mod: 26

## 2018-01-31 PROCEDURE — 99223 1ST HOSP IP/OBS HIGH 75: CPT

## 2018-01-31 PROCEDURE — 99497 ADVNCD CARE PLAN 30 MIN: CPT | Mod: 25

## 2018-01-31 PROCEDURE — 99233 SBSQ HOSP IP/OBS HIGH 50: CPT

## 2018-01-31 RX ORDER — NALOXONE HYDROCHLORIDE 4 MG/.1ML
0.1 SPRAY NASAL
Qty: 0 | Refills: 0 | Status: DISCONTINUED | OUTPATIENT
Start: 2018-01-31 | End: 2018-02-03

## 2018-01-31 RX ORDER — MORPHINE SULFATE 50 MG/1
4 CAPSULE, EXTENDED RELEASE ORAL
Qty: 0 | Refills: 0 | Status: DISCONTINUED | OUTPATIENT
Start: 2018-01-31 | End: 2018-02-01

## 2018-01-31 RX ORDER — MORPHINE SULFATE 50 MG/1
15 CAPSULE, EXTENDED RELEASE ORAL EVERY 12 HOURS
Qty: 0 | Refills: 0 | Status: DISCONTINUED | OUTPATIENT
Start: 2018-01-31 | End: 2018-02-03

## 2018-01-31 RX ORDER — SODIUM CHLORIDE 9 MG/ML
1000 INJECTION INTRAMUSCULAR; INTRAVENOUS; SUBCUTANEOUS
Qty: 0 | Refills: 0 | Status: COMPLETED | OUTPATIENT
Start: 2018-01-31 | End: 2018-02-01

## 2018-01-31 RX ADMIN — MORPHINE SULFATE 15 MILLIGRAM(S): 50 CAPSULE, EXTENDED RELEASE ORAL at 19:30

## 2018-01-31 RX ADMIN — MORPHINE SULFATE 4 MILLIGRAM(S): 50 CAPSULE, EXTENDED RELEASE ORAL at 08:00

## 2018-01-31 RX ADMIN — SENNA PLUS 2 TABLET(S): 8.6 TABLET ORAL at 22:16

## 2018-01-31 RX ADMIN — MORPHINE SULFATE 4 MILLIGRAM(S): 50 CAPSULE, EXTENDED RELEASE ORAL at 03:49

## 2018-01-31 RX ADMIN — Medication 4 MILLIGRAM(S): at 07:19

## 2018-01-31 RX ADMIN — Medication 100 MILLIGRAM(S): at 07:11

## 2018-01-31 RX ADMIN — Medication 100 MILLIGRAM(S): at 22:16

## 2018-01-31 RX ADMIN — MORPHINE SULFATE 15 MILLIGRAM(S): 50 CAPSULE, EXTENDED RELEASE ORAL at 19:00

## 2018-01-31 RX ADMIN — MORPHINE SULFATE 2 MILLIGRAM(S): 50 CAPSULE, EXTENDED RELEASE ORAL at 10:56

## 2018-01-31 RX ADMIN — SODIUM CHLORIDE 75 MILLILITER(S): 9 INJECTION INTRAMUSCULAR; INTRAVENOUS; SUBCUTANEOUS at 22:20

## 2018-01-31 RX ADMIN — MORPHINE SULFATE 4 MILLIGRAM(S): 50 CAPSULE, EXTENDED RELEASE ORAL at 22:35

## 2018-01-31 RX ADMIN — MORPHINE SULFATE 2 MILLIGRAM(S): 50 CAPSULE, EXTENDED RELEASE ORAL at 11:11

## 2018-01-31 RX ADMIN — PANTOPRAZOLE SODIUM 40 MILLIGRAM(S): 20 TABLET, DELAYED RELEASE ORAL at 07:12

## 2018-01-31 RX ADMIN — MORPHINE SULFATE 4 MILLIGRAM(S): 50 CAPSULE, EXTENDED RELEASE ORAL at 22:16

## 2018-01-31 RX ADMIN — Medication 4 MILLIGRAM(S): at 19:00

## 2018-01-31 NOTE — CONSULT NOTE ADULT - PROBLEM SELECTOR RECOMMENDATION 7
Discussed the patient's preferred medical communication style.  She would like her daughter to be heavily involved, and likely have the daughter be the HCP    Total time spent 30min

## 2018-01-31 NOTE — CONSULT NOTE ADULT - ATTENDING COMMENTS
Pt seen and examined  She was in no overt physical distress during our encounter.  Ms. 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 Deluca to seek evaluation.  Since admission, the patient used 4 doses of 4 mg IV morphine, which successfully brought the pain from a 10/10 to a level of comfort, close to 2/10  Sixteen mg of IV morphine is equivalent to 48 mg of po morphine used in a 24 hour period.  If rapidly escalating pain, consider a PCA transition to match potential needs.  The patient is likely to complete EBRT at [facility].  We will confer with the Medicine team and Oncology

## 2018-01-31 NOTE — PROGRESS NOTE ADULT - PROBLEM SELECTOR PLAN 1
2/2 stage IV breast cancer metastasis-previously admitted no neurosurgical intervention recommended Rad Onc   -heme Onc consult- case d/w  and Dr. Banuelos to see pt today  -palliative care consult for pain management  - Continue decadron 4mg q8hrs with PPI  - s/p radiation treatment   - Fall, aspiration, seizure precautions

## 2018-01-31 NOTE — PROGRESS NOTE ADULT - SUBJECTIVE AND OBJECTIVE BOX
Patient is a 57y old  Female who presents with a chief complaint of headache (30 Jan 2018 04:02)      SUBJECTIVE / OVERNIGHT EVENTS: Pain on and off s/p BM yesterday x 2    MEDICATIONS  (STANDING):  dexamethasone     Tablet 4 milliGRAM(s) Oral every 8 hours  docusate sodium 100 milliGRAM(s) Oral three times a day  morphine ER Tablet 15 milliGRAM(s) Oral every 12 hours  pantoprazole    Tablet 40 milliGRAM(s) Oral before breakfast  polyethylene glycol 3350 17 Gram(s) Oral daily  senna 2 Tablet(s) Oral at bedtime  sodium chloride 0.9%. 1000 milliLiter(s) (100 mL/Hr) IV Continuous <Continuous>    MEDICATIONS  (PRN):  bisacodyl Suppository 10 milliGRAM(s) Rectal daily PRN Constipation  morphine  - Injectable 4 milliGRAM(s) IV Push every 3 hours PRN moderate to severe pain        CAPILLARY BLOOD GLUCOSE        I&O's Summary    30 Jan 2018 07:01  -  31 Jan 2018 07:00  --------------------------------------------------------  IN: 0 mL / OUT: 300 mL / NET: -300 mL        T(C): 36.9 (01-31-18 @ 04:36), Max: 36.9 (01-31-18 @ 04:36)  HR: 89 (01-31-18 @ 04:36) (67 - 89)  BP: 143/90 (01-31-18 @ 04:36) (120/75 - 143/90)  RR: 17 (01-31-18 @ 04:36) (17 - 18)  SpO2: 98% (01-31-18 @ 04:36) (98% - 100%)    PHYSICAL EXAM:  GENERAL: NAD, well-developed  HEAD:  posterior scalp mass  EYES: EOMI, PERRLA, conjunctiva and sclera clear  NECK: Supple, No JVD  CHEST/LUNG: Clear to auscultation bilaterally; No wheeze  HEART: Regular rate and rhythm; No murmurs, rubs, or gallops  ABDOMEN: Soft, Nontender, Nondistended; Bowel sounds present  EXTREMITIES:  2+ Peripheral Pulses, No clubbing, cyanosis, or edema  PSYCH: AAOx3      LABS:                        9.7    9.69  )-----------( 272      ( 31 Jan 2018 04:28 )             32.0     01-31    133<L>  |  96<L>  |  7   ----------------------------<  121<H>  4.3   |  24  |  0.24<L>    Ca    8.2<L>      31 Jan 2018 04:28  Phos  1.3     01-31  Mg     2.1     01-31    TPro  7.2  /  Alb  3.5  /  TBili  0.8  /  DBili  x   /  AST  19  /  ALT  23  /  AlkPhos  100  01-29                RADIOLOGY & ADDITIONAL TESTS:    Imaging Personally Reviewed:    Consultant(s) Notes Reviewed:      Care Discussed with Consultants/Other Providers:

## 2018-02-01 ENCOUNTER — TRANSCRIPTION ENCOUNTER (OUTPATIENT)
Age: 58
End: 2018-02-01

## 2018-02-01 DIAGNOSIS — C50.919 MALIGNANT NEOPLASM OF UNSPECIFIED SITE OF UNSPECIFIED FEMALE BREAST: ICD-10-CM

## 2018-02-01 DIAGNOSIS — Z71.89 OTHER SPECIFIED COUNSELING: ICD-10-CM

## 2018-02-01 LAB
BASOPHILS # BLD AUTO: 0.01 K/UL — SIGNIFICANT CHANGE UP (ref 0–0.2)
BASOPHILS NFR BLD AUTO: 0.1 % — SIGNIFICANT CHANGE UP (ref 0–2)
BUN SERPL-MCNC: 6 MG/DL — LOW (ref 7–23)
BUN SERPL-MCNC: 6 MG/DL — LOW (ref 7–23)
CALCIUM SERPL-MCNC: 6.7 MG/DL — LOW (ref 8.4–10.5)
CALCIUM SERPL-MCNC: 7.9 MG/DL — LOW (ref 8.4–10.5)
CHLORIDE SERPL-SCNC: 102 MMOL/L — SIGNIFICANT CHANGE UP (ref 98–107)
CHLORIDE SERPL-SCNC: 96 MMOL/L — LOW (ref 98–107)
CO2 SERPL-SCNC: 21 MMOL/L — LOW (ref 22–31)
CO2 SERPL-SCNC: 22 MMOL/L — SIGNIFICANT CHANGE UP (ref 22–31)
CREAT SERPL-MCNC: 0.2 MG/DL — LOW (ref 0.5–1.3)
CREAT SERPL-MCNC: < 0.2 MG/DL — LOW (ref 0.5–1.3)
EOSINOPHIL # BLD AUTO: 0 K/UL — SIGNIFICANT CHANGE UP (ref 0–0.5)
EOSINOPHIL NFR BLD AUTO: 0 % — SIGNIFICANT CHANGE UP (ref 0–6)
GLUCOSE SERPL-MCNC: 132 MG/DL — HIGH (ref 70–99)
GLUCOSE SERPL-MCNC: 150 MG/DL — HIGH (ref 70–99)
HCT VFR BLD CALC: 26.4 % — LOW (ref 34.5–45)
HGB BLD-MCNC: 8.4 G/DL — LOW (ref 11.5–15.5)
IMM GRANULOCYTES # BLD AUTO: 0.06 # — SIGNIFICANT CHANGE UP
IMM GRANULOCYTES NFR BLD AUTO: 0.8 % — SIGNIFICANT CHANGE UP (ref 0–1.5)
LYMPHOCYTES # BLD AUTO: 0.38 K/UL — LOW (ref 1–3.3)
LYMPHOCYTES # BLD AUTO: 5 % — LOW (ref 13–44)
MAGNESIUM SERPL-MCNC: 1.7 MG/DL — SIGNIFICANT CHANGE UP (ref 1.6–2.6)
MAGNESIUM SERPL-MCNC: 2 MG/DL — SIGNIFICANT CHANGE UP (ref 1.6–2.6)
MCHC RBC-ENTMCNC: 27.3 PG — SIGNIFICANT CHANGE UP (ref 27–34)
MCHC RBC-ENTMCNC: 31.8 % — LOW (ref 32–36)
MCV RBC AUTO: 85.7 FL — SIGNIFICANT CHANGE UP (ref 80–100)
MONOCYTES # BLD AUTO: 0.22 K/UL — SIGNIFICANT CHANGE UP (ref 0–0.9)
MONOCYTES NFR BLD AUTO: 2.9 % — SIGNIFICANT CHANGE UP (ref 2–14)
NEUTROPHILS # BLD AUTO: 6.9 K/UL — SIGNIFICANT CHANGE UP (ref 1.8–7.4)
NEUTROPHILS NFR BLD AUTO: 91.2 % — HIGH (ref 43–77)
NRBC # FLD: 0 — SIGNIFICANT CHANGE UP
PHOSPHATE SERPL-MCNC: 1.6 MG/DL — LOW (ref 2.5–4.5)
PHOSPHATE SERPL-MCNC: 1.9 MG/DL — LOW (ref 2.5–4.5)
PLATELET # BLD AUTO: 174 K/UL — SIGNIFICANT CHANGE UP (ref 150–400)
PMV BLD: 9.5 FL — SIGNIFICANT CHANGE UP (ref 7–13)
POTASSIUM SERPL-MCNC: 3.1 MMOL/L — LOW (ref 3.5–5.3)
POTASSIUM SERPL-MCNC: 3.9 MMOL/L — SIGNIFICANT CHANGE UP (ref 3.5–5.3)
POTASSIUM SERPL-SCNC: 3.1 MMOL/L — LOW (ref 3.5–5.3)
POTASSIUM SERPL-SCNC: 3.9 MMOL/L — SIGNIFICANT CHANGE UP (ref 3.5–5.3)
RBC # BLD: 3.08 M/UL — LOW (ref 3.8–5.2)
RBC # FLD: 19.8 % — HIGH (ref 10.3–14.5)
SODIUM SERPL-SCNC: 132 MMOL/L — LOW (ref 135–145)
SODIUM SERPL-SCNC: 135 MMOL/L — SIGNIFICANT CHANGE UP (ref 135–145)
WBC # BLD: 7.57 K/UL — SIGNIFICANT CHANGE UP (ref 3.8–10.5)
WBC # FLD AUTO: 7.57 K/UL — SIGNIFICANT CHANGE UP (ref 3.8–10.5)

## 2018-02-01 PROCEDURE — 99232 SBSQ HOSP IP/OBS MODERATE 35: CPT

## 2018-02-01 PROCEDURE — G9001: CPT

## 2018-02-01 PROCEDURE — 77427 RADIATION TX MANAGEMENT X5: CPT

## 2018-02-01 PROCEDURE — 99233 SBSQ HOSP IP/OBS HIGH 50: CPT

## 2018-02-01 RX ORDER — MORPHINE SULFATE 50 MG/1
15 CAPSULE, EXTENDED RELEASE ORAL EVERY 4 HOURS
Qty: 0 | Refills: 0 | Status: DISCONTINUED | OUTPATIENT
Start: 2018-02-01 | End: 2018-02-03

## 2018-02-01 RX ADMIN — MORPHINE SULFATE 4 MILLIGRAM(S): 50 CAPSULE, EXTENDED RELEASE ORAL at 11:32

## 2018-02-01 RX ADMIN — Medication 4 MILLIGRAM(S): at 09:27

## 2018-02-01 RX ADMIN — Medication 4 MILLIGRAM(S): at 15:31

## 2018-02-01 RX ADMIN — POLYETHYLENE GLYCOL 3350 17 GRAM(S): 17 POWDER, FOR SOLUTION ORAL at 13:22

## 2018-02-01 RX ADMIN — SODIUM CHLORIDE 75 MILLILITER(S): 9 INJECTION INTRAMUSCULAR; INTRAVENOUS; SUBCUTANEOUS at 21:02

## 2018-02-01 RX ADMIN — MORPHINE SULFATE 15 MILLIGRAM(S): 50 CAPSULE, EXTENDED RELEASE ORAL at 16:00

## 2018-02-01 RX ADMIN — MORPHINE SULFATE 15 MILLIGRAM(S): 50 CAPSULE, EXTENDED RELEASE ORAL at 17:55

## 2018-02-01 RX ADMIN — Medication 100 MILLIGRAM(S): at 13:23

## 2018-02-01 RX ADMIN — MORPHINE SULFATE 15 MILLIGRAM(S): 50 CAPSULE, EXTENDED RELEASE ORAL at 05:59

## 2018-02-01 RX ADMIN — Medication 4 MILLIGRAM(S): at 23:01

## 2018-02-01 RX ADMIN — Medication 4 MILLIGRAM(S): at 00:30

## 2018-02-01 RX ADMIN — MORPHINE SULFATE 15 MILLIGRAM(S): 50 CAPSULE, EXTENDED RELEASE ORAL at 21:02

## 2018-02-01 RX ADMIN — Medication 100 MILLIGRAM(S): at 06:00

## 2018-02-01 RX ADMIN — MORPHINE SULFATE 15 MILLIGRAM(S): 50 CAPSULE, EXTENDED RELEASE ORAL at 22:00

## 2018-02-01 RX ADMIN — MORPHINE SULFATE 4 MILLIGRAM(S): 50 CAPSULE, EXTENDED RELEASE ORAL at 11:47

## 2018-02-01 RX ADMIN — PANTOPRAZOLE SODIUM 40 MILLIGRAM(S): 20 TABLET, DELAYED RELEASE ORAL at 06:00

## 2018-02-01 RX ADMIN — Medication 62.5 MILLIMOLE(S): at 23:01

## 2018-02-01 RX ADMIN — SENNA PLUS 2 TABLET(S): 8.6 TABLET ORAL at 21:02

## 2018-02-01 RX ADMIN — Medication 100 MILLIGRAM(S): at 21:02

## 2018-02-01 RX ADMIN — MORPHINE SULFATE 15 MILLIGRAM(S): 50 CAPSULE, EXTENDED RELEASE ORAL at 15:31

## 2018-02-01 NOTE — PROGRESS NOTE ADULT - PROBLEM SELECTOR PLAN 3
Continue MsContin 15mg BID with Morphine 4mg q3h IV PRN. Continue MsContin 15mg BID and transition Morphine sulfate IR 15mg q4h PRN.

## 2018-02-01 NOTE — CHART NOTE - NSCHARTNOTEFT_GEN_A_CORE
NUTRITION SERVICES     Upon Nutritional Assessment by the Registered Dietitian your patient was determined to meet criteria/ has evidence of the following diagnosis/diagnoses:  [ ] Mild Protein Calorie Malnutrition   [ ] Moderate Protein Calorie Malnutrition   [X] Severe Protein Calorie Malnutrition   [ ] Unspecified Protein Calorie Malnutrition   [ ] Underweight / BMI <19  [ ] Morbid Obesity / BMI >40    Findings as based on:  •  Comprehensive nutritional assessment and consultation    Please refer to Initial Dietitian Evaluation via documents section of [employer] for further recommendations.    Dionicio Hess RD,CD/N,CDE

## 2018-02-01 NOTE — DIETITIAN INITIAL EVALUATION ADULT. - PROBLEM SELECTOR PLAN 1
2/2 stage IV breast cancer metastasis   - Will treat with morphine 4mg q4hr for severe pain and morphine 2mg q4hrs for moderate pain   - Continue decadron 4mg q8hrs with PPI  - Will need to touch base with radiation oncology team in AM to arrange to treatment today  - Fall, aspiration, seizure precautions

## 2018-02-01 NOTE — DISCHARGE NOTE ADULT - MEDICATION SUMMARY - MEDICATIONS TO TAKE
I will START or STAY ON the medications listed below when I get home from the hospital:    dexamethasone 4 mg oral tablet  -- 1 tab(s) by mouth every 8 hours  -- Indication: For Brain metastases    morphine 15 mg oral tablet  -- 1 tab(s) by mouth every 4 hours, As needed, Moderate to severe pain MDD:45mg  -- Indication: For Pain, neoplasm-related    morphine 15 mg/12 hr oral tablet, extended release  -- 1 tab(s) by mouth every 12 hours MDD:30mg  -- Indication: For Pain, neoplasm-related    senna oral tablet  -- 2 tab(s) by mouth once a day (at bedtime)  -- Indication: For Bowel regimen     bisacodyl 10 mg rectal suppository  -- 1 suppository(ies) rectally once a day, As needed, Constipation  -- Indication: For Bowel regimen     docusate sodium 100 mg oral capsule  -- 1 cap(s) by mouth 3 times a day  -- Indication: For Bowel regimen     polyethylene glycol 3350 oral powder for reconstitution  -- 17 gram(s) by mouth once a day  -- Indication: For Bowel regimen     pantoprazole 40 mg oral delayed release tablet  -- 1 tab(s) by mouth once a day (before a meal)  -- Indication: For Prophylaxis

## 2018-02-01 NOTE — CONSULT NOTE ADULT - SUBJECTIVE AND OBJECTIVE BOX
[facility] Hematology/Oncology - Danielito Gutierrez / Derick / Vin - [phone]  Primary Outpatient Hematologist/Oncologist: Dr. Derian Byrd    Chief Complaint:  Headache    HPI:  57 year old woman with history of Stage IV breast cancer with mets to the bone, brain, and lung who has progressed on multiple lines of therapy who is s/p craniotomy x 5 and current undergoing radiation presents with headahces. Patient reports right sided headache which has been present for several days. MRI performed by in early January 2018 showed that disease in her brain has been increasing in size and she has been undergoing radiation. She has dizziness and nausea after radiation therapy. No fevers or chills. Reports that she has constant pain. She reports being mainly bedbound at home and needs significant assistance to ambulate at home.      ROS:  General:  (+)Pain, (+)Decrease appetite, (-)Fevers, (-)Chills, (+)Weight Loss  Eyes: (-)Blurry Vision, (-)Double Vision, (-)Vision Loss  ENT: (-)Sinus Congestion, (-)Decreased Hearing, (-)Nosebleeds, (-)Sore Throat  Cardiac: (-)Chest Pain, (-)Palpitations, (-)Shortness of breathe on exertion  Respiratory: (-)Cough, (-)hemoptysis, (-)Shortness of breathe  GI: (-)Nausea, (-)Vomiting, (-)Diarrhea, (-)Constipation, (-)Melena, (-)BRBPR  : (-)Hematuria, (-)Dysuria, (-)Polyuia  MSK: (-)Back pain, (-)Joint pain, (-)Stiffness  Dermatology: (-)Rash, (-)Itching  Neurology:  (-)Numbness, (-)Tingling, (+)Difficulty Walking, (-)Tremors, (+)Weakness  Psych: (-)Anxiety, (-)Depression, (-)Memory Loss  Hematology:  (-)Easy Bruising, (-)Easy Bleeding, (-)Night Sweats.     PAST MEDICAL & SURGICAL HISTORY:  Brachial neuritis: left  Breast cancer: left, mets to bone, lung, lymph nodes dx in 2014  mets to brain dx 2017  H/O brain surgery: craniotomy 3/2017  H/O bilateral mastectomy  H/O bilateral mastectomy: 2014    Social History  Tobacco: Denies current use  Alcohol: Denies current use  Drugs:  Denies current use    FAMILY HISTORY:  No pertinent family history in first degree relatives    MEDICATIONS  (STANDING):  dexamethasone     Tablet 4 milliGRAM(s) Oral every 8 hours  docusate sodium 100 milliGRAM(s) Oral three times a day  morphine ER Tablet 15 milliGRAM(s) Oral every 12 hours  pantoprazole    Tablet 40 milliGRAM(s) Oral before breakfast  polyethylene glycol 3350 17 Gram(s) Oral daily  senna 2 Tablet(s) Oral at bedtime  sodium chloride 0.9%. 1000 milliLiter(s) (75 mL/Hr) IV Continuous <Continuous>    MEDICATIONS  (PRN):  bisacodyl Suppository 10 milliGRAM(s) Rectal daily PRN Constipation  morphine  - Injectable 4 milliGRAM(s) IV Push every 3 hours PRN moderate to severe pain  naloxone Injectable 0.1 milliGRAM(s) IV Push every 3 minutes PRN RR<10 or unarouseable    Allergies  No Known Allergies    Vital Signs Last 24 Hrs  T(C): 36.7 (01 Feb 2018 05:55), Max: 37 (31 Jan 2018 14:00)  T(F): 98 (01 Feb 2018 05:55), Max: 98.6 (31 Jan 2018 14:00)  HR: 68 (01 Feb 2018 05:55) (68 - 92)  BP: 125/67 (01 Feb 2018 05:55) (103/58 - 125/67)  RR: 16 (01 Feb 2018 05:55) (16 - 18)  SpO2: 99% (01 Feb 2018 05:55) (97% - 100%)    Physical Exam:  General: AAO x 3. NAD. Lying in bed comfortably.  HEENT: clear oropharynx, anicteric sclera, pink conjunctivae, EOMi. PERRLA  Cardiac: S1, S2 present. No audible murmurs. RRR  Lungs: CTA B/L.   Abdomen: Soft, Non-Tender, Non-Distended. No palpable hepatosplenomegaly  Extremities: 2+ pulses. No edema  Skin: No Rashes. No petechiae  Neuro: No focal motor or sensory deficits.     Labs:  CBC Full  -  ( 01 Feb 2018 06:30 )  WBC Count : 7.57 K/uL  Hemoglobin : 8.4 g/dL  Hematocrit : 26.4 %  Platelet Count - Automated : 174 K/uL  Mean Cell Volume : 85.7 fL  Mean Cell Hemoglobin : 27.3 pg  Mean Cell Hemoglobin Concentration : 31.8 %  Auto Neutrophil # : 6.90 K/uL  Auto Lymphocyte # : 0.38 K/uL  Auto Monocyte # : 0.22 K/uL  Auto Eosinophil # : 0.00 K/uL  Auto Basophil # : 0.01 K/uL  Auto Neutrophil % : 91.2 %  Auto Lymphocyte % : 5.0 %  Auto Monocyte % : 2.9 %  Auto Eosinophil % : 0.0 %  Auto Basophil % : 0.1 %    01-31    133<L>  |  96<L>  |  7   ----------------------------<  121<H>  4.3   |  24  |  0.24<L>    Ca    8.2<L>      31 Jan 2018 04:28  Phos  1.3     01-31  Mg     2.1     01-31    Radiology:  MRI Brain 1/12/2018  IMPRESSION:  Increased parenchymal metastatic disease.  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

## 2018-02-01 NOTE — DISCHARGE NOTE ADULT - PLAN OF CARE
Continue with radiation treatment Ensure three times a day Continue with Senna, Miralax as prescribed Continue with decadron continue radiation treatment Continue with radiation treatment to complete 10 day at [facility].  Please follow up with the medical doctors upon arrival to rehab. Continue decadron and pain regimen as prescribed. Please follow up with palliative physician Dr. Watson for pain management.  Please call to make an appointment within 1 week of discharge. Please follow up with your oncologist Dr. Byrd.  Please call to make an appointment. Continue with radiation treatment to complete 10 day treatments.  Please follow up with the medical doctors upon arrival to rehab. Continue bowel regimen increase appetite Please follow up with the medical doctors upon arrival to rehab.  Continue Ensure Clear TID continue radiation therapy Please follow up with the medical doctors upon arrival to rehab.  Please continue radiation therapy.  Please follow up with Oncologist Dr. Byrd.  Please call to make an appointment. - Continue with radiation treatment to complete 10 day at [facility].    - You have completed 6 treatments 4 more treatments left.   - Please follow up with the medical doctors upon arrival to rehab.   - Continue Decadron and pain regimen as prescribed.   - Please follow up with Palliative physician Dr. Watson for pain management.  Please call to make an appointment within 1 week of discharge. - Please follow up with your oncologist Dr. Byrd.   - Please call to make an appointment. Continue with radiation treatment to complete 10 day treatments.  - Please follow up with the medical doctors upon arrival to rehab. - Continue with Senna, Miralax, Dulcolax as prescribed  - stay well hydrated - Please follow up with the medical doctors upon arrival to rehab.  Continue Ensure Clear TID - Please follow up with the medical doctors upon arrival to rehab.   - Please continue radiation therapy for 4 more treatments.   - Please follow up with Oncologist Dr. Byrd.  Please call to make an appointment. Please follow up on Monday 2/5/2018.  Radiation Oncology [phone]  - Continue with radiation treatment to complete 10 day at [facility].    - You have completed 6 treatments 4 more treatments left.   - Continue Decadron and pain regimen as prescribed.   - Please follow up with Palliative physician Dr. Watson for pain management.  Please call to make an appointment within 1 week of discharge.  Please follow up with the medical staff with [facility] - Please follow up with your oncologist Dr. Byrd.   - Please call to make an appointment. Continue with radiation treatment to complete 10 day treatments.    - Please follow up with the medical staff with [facility] - Continue with Senna, Miralax, Dulcolax as prescribed  - stay well hydrated  -Please follow up with the medical staff with [facility] - Please follow up with the medical staff with [facility] .  Continue Ensure Clear TID - Please follow up with the medical staff with [facility]   - Please continue radiation therapy for 4 more treatments.   - Please follow up with Oncologist Dr. Byrd.  Please call to make an appointment.

## 2018-02-01 NOTE — CONSULT NOTE ADULT - ASSESSMENT
57 year old woman with metastatic breast cancer who has progressed on multiple lines and performance status has continued to declined. ECOG 3. MR shows worsening disease in the brain. Has not been on treatment for several months due to poor performance status.
57 year old woman with Stage IV breast cancer, brain metastases neoplasm pain, constipation, debility and encounter for palliative care.

## 2018-02-01 NOTE — DISCHARGE NOTE ADULT - HOSPITAL COURSE
58 yo Female with hx of breast cancer stage IV s/p craniotomy x5, chemo, currently on radiation presenting with headaches 2/2 mets     Hospital Course:  [facility]AC[facility]   -2/2 stage IV breast cancer metastasis   -Palliative and radiation oncology consulted  - Continue decadron 4mg q8hrs with PPI  Dr. Byrd. As per oncologist, no more disease modifying treatment offered.       CXR: Right hilar mass corresponding to known right upper lobe mass   noted on CT dated 1/11/2018.      STAGE 4 BREAST CANCER  -s/p craniotomy x5, chemo, currently on radiation presenting with headaches 2/2 mets   - Continue decadron 4mg q8hrs with PPI  -Dr. Byrd. As per oncologist, no more disease modifying treatment offered.   -Radiation Oncology: total of 10 radiation treatments         CONSTIPATION   - X-Ray of Abdomen:Nonobstructive bowel gas pattern  - Senna, MiraLax, colace      APPETITE LOSS  -hypochloremia and hyponatremia   - IV maintenance fluids   - TSH:1.31, folate: 7.1, B12 217 WNL   -Nutrition: ensure clear TID       -PT consult: Rehab 56 yo Female with hx of breast cancer stage IV s/p craniotomy x5, chemo, currently on radiation presenting with headaches 2/2 mets     Hospital Course    [facility]ACHE -2/2 stage IV breast cancer metastasis. Palliative and radiation oncology consulted. Continue decadron 4mg q8hrs with PPI Dr. Byrd. As per oncologist, no more disease modifying treatment offered.   CXR: Right hilar mass corresponding to known right upper lobe mass noted on CT dated 1/11/2018.    STAGE 4 BREAST CANCER -s/p craniotomy x5, chemo, currently on radiation presenting with headaches 2/2 mets. Continue Decadron 4mg q8hrs with PPI.. As per oncologist, no more disease modifying treatment offered. Radiation Oncology recs total of 10 radiation treatments     CONSTIPATION - X-Ray of Abdomen -Nonobstructive bowel gas pattern. Senna, MiraLax, colace    APPETITE LOSS - hypochloremia and hyponatremia. IV maintenance fluids. TSH:1.31, folate: 7.1, B12 217 WNL. Nutrition: ensure clear TID     PT consult: Rehab 58 yo Female with hx of breast cancer stage IV s/p craniotomy x5, chemo, currently on radiation presenting with headaches 2/2 mets     Hospital Course    [facility]ACHE -2/2 stage IV breast cancer metastasis. Palliative and radiation oncology consulted. Continue decadron 4mg q8hrs with PPI Dr. Byrd. As per oncologist, no more disease modifying treatment offered.   CXR: Right hilar mass corresponding to known right upper lobe mass noted on CT dated 1/11/2018.    STAGE 4 BREAST CANCER -s/p craniotomy x5, chemo, currently on radiation presenting with headaches 2/2 mets. Continue Decadron 4mg q8hrs with PPI.. As per oncologist, no more disease modifying treatment offered. Radiation Oncology recs total of 10 radiation treatments. Continue Radiation treatments at [facility].      CONSTIPATION - X-Ray of Abdomen -Nonobstructive bowel gas pattern. Senna, MiraLax, colace    APPETITE LOSS - hypochloremia and hyponatremia. IV maintenance fluids. TSH:1.31, folate: 7.1, B12 217 WNL. Nutrition: ensure clear TID     PT consult: Rehab ; Home with Hospice

## 2018-02-01 NOTE — DIETITIAN INITIAL EVALUATION ADULT. - PHYSICAL APPEARANCE
Nutrition focused physical exam conducted - found signs of malnutrition : [X ]Present   Subcutaneous fat loss: [X] Orbital fat pads region, [ ]Buccal fat region, [ ]Triceps region,  [ ]Ribs region  Muscle wasting: [SEVERE]Temples region, [SEVERE]Clavicle region, [ ]Shoulder region, [ ]Scapula region, [SEVERE] Interosseous region,  [ ]thigh region, [ ]Calf region.

## 2018-02-01 NOTE — PROGRESS NOTE ADULT - PROBLEM SELECTOR PLAN 1
Patient seen by oncologist at [facility] - Dr. Byrd. As per oncologist, no more disease modifying treatment offered.

## 2018-02-01 NOTE — PROGRESS NOTE ADULT - PROBLEM SELECTOR PLAN 4
Given patient had constipation prior to admission. Would continue aggressive Bowel regimen. Given patient had constipation prior to admission. Would continue aggressive Bowel regimen given on opioids.

## 2018-02-01 NOTE — DISCHARGE NOTE ADULT - CARE PROVIDER_API CALL
Derian Byrd), Internal Medicine; Medical Oncology  [address] [address]  Phone: [phone]  Fax: [phone]    Uyen Mishra), [facility] Medicine; Internal Medicine  [address] [place] [address]  Phone: [phone]  Fax: [phone]

## 2018-02-01 NOTE — PROGRESS NOTE ADULT - SUBJECTIVE AND OBJECTIVE BOX
Patient is a 57y old  Female who presents with a chief complaint of headache (30 Jan 2018 04:02)      SUBJECTIVE / OVERNIGHT EVENTS:    MEDICATIONS  (STANDING):  dexamethasone     Tablet 4 milliGRAM(s) Oral every 8 hours  docusate sodium 100 milliGRAM(s) Oral three times a day  morphine ER Tablet 15 milliGRAM(s) Oral every 12 hours  pantoprazole    Tablet 40 milliGRAM(s) Oral before breakfast  polyethylene glycol 3350 17 Gram(s) Oral daily  senna 2 Tablet(s) Oral at bedtime  sodium chloride 0.9%. 1000 milliLiter(s) (75 mL/Hr) IV Continuous <Continuous>    MEDICATIONS  (PRN):  bisacodyl Suppository 10 milliGRAM(s) Rectal daily PRN Constipation  morphine  - Injectable 4 milliGRAM(s) IV Push every 3 hours PRN moderate to severe pain  naloxone Injectable 0.1 milliGRAM(s) IV Push every 3 minutes PRN RR<10 or unarouseable        CAPILLARY BLOOD GLUCOSE        I&O's Summary      T(C): 36.7 (02-01-18 @ 05:55), Max: 37 (01-31-18 @ 14:00)  HR: 68 (02-01-18 @ 05:55) (68 - 92)  BP: 125/67 (02-01-18 @ 05:55) (103/58 - 125/67)  RR: 16 (02-01-18 @ 05:55) (16 - 18)  SpO2: 99% (02-01-18 @ 05:55) (97% - 100%)    PHYSICAL EXAM:  GENERAL: NAD, well-developed  HEAD:  Atraumatic, Normocephalic  EYES: EOMI, PERRLA, conjunctiva and sclera clear  NECK: Supple, No JVD  CHEST/LUNG: Clear to auscultation bilaterally; No wheeze  HEART: Regular rate and rhythm; No murmurs, rubs, or gallops  ABDOMEN: Soft, Nontender, Nondistended; Bowel sounds present  EXTREMITIES:  2+ Peripheral Pulses, No clubbing, cyanosis, or edema  PSYCH: AAOx3  NEUROLOGY: non-focal  SKIN: No rashes or lesions    LABS:                        8.4    7.57  )-----------( 174      ( 01 Feb 2018 06:30 )             26.4     01-31    133<L>  |  96<L>  |  7   ----------------------------<  121<H>  4.3   |  24  |  0.24<L>    Ca    8.2<L>      31 Jan 2018 04:28  Phos  1.3     01-31  Mg     2.1     01-31                  RADIOLOGY & ADDITIONAL TESTS:    Imaging Personally Reviewed:    Consultant(s) Notes Reviewed:      Care Discussed with Consultants/Other Providers: Patient is a 57y old  Female who presents with a chief complaint of headache (30 Jan 2018 04:02)      SUBJECTIVE / OVERNIGHT EVENTS: Pt feels better today would like brochures in regards to hospice    MEDICATIONS  (STANDING):  dexamethasone     Tablet 4 milliGRAM(s) Oral every 8 hours  docusate sodium 100 milliGRAM(s) Oral three times a day  morphine ER Tablet 15 milliGRAM(s) Oral every 12 hours  pantoprazole    Tablet 40 milliGRAM(s) Oral before breakfast  polyethylene glycol 3350 17 Gram(s) Oral daily  senna 2 Tablet(s) Oral at bedtime  sodium chloride 0.9%. 1000 milliLiter(s) (75 mL/Hr) IV Continuous <Continuous>    MEDICATIONS  (PRN):  bisacodyl Suppository 10 milliGRAM(s) Rectal daily PRN Constipation  morphine  - Injectable 4 milliGRAM(s) IV Push every 3 hours PRN moderate to severe pain  naloxone Injectable 0.1 milliGRAM(s) IV Push every 3 minutes PRN RR<10 or unarouseable        CAPILLARY BLOOD GLUCOSE        I&O's Summary      T(C): 36.7 (02-01-18 @ 05:55), Max: 37 (01-31-18 @ 14:00)  HR: 68 (02-01-18 @ 05:55) (68 - 92)  BP: 125/67 (02-01-18 @ 05:55) (103/58 - 125/67)  RR: 16 (02-01-18 @ 05:55) (16 - 18)  SpO2: 99% (02-01-18 @ 05:55) (97% - 100%)    PHYSICAL EXAM:  GENERAL: NAD, well-developed  HEAD:  Atraumatic, Normocephalic  EYES: EOMI, PERRLA, conjunctiva and sclera clear  NECK: Supple, No JVD  CHEST/LUNG: Clear to auscultation bilaterally; No wheeze  HEART: Regular rate and rhythm; No murmurs, rubs, or gallops  ABDOMEN: Soft, Nontender, Nondistended; Bowel sounds present  EXTREMITIES:  2+ Peripheral Pulses, No clubbing, cyanosis, or edema  PSYCH: AAOx3  NEUROLOGY: non-focal  SKIN: No rashes or lesions    LABS:                        8.4    7.57  )-----------( 174      ( 01 Feb 2018 06:30 )             26.4     01-31    133<L>  |  96<L>  |  7   ----------------------------<  121<H>  4.3   |  24  |  0.24<L>    Ca    8.2<L>      31 Jan 2018 04:28  Phos  1.3     01-31  Mg     2.1     01-31                  RADIOLOGY & ADDITIONAL TESTS:    Imaging Personally Reviewed:    Consultant(s) Notes Reviewed:      Care Discussed with Consultants/Other Providers:

## 2018-02-01 NOTE — DISCHARGE NOTE ADULT - PATIENT PORTAL LINK FT
“You can access the [facility] Patient Portal, offered by [facility], by registering with the following website: [email or web address]”

## 2018-02-01 NOTE — PROGRESS NOTE ADULT - SUBJECTIVE AND OBJECTIVE BOX
INTERVAL HPI/OVERNIGHT EVENTS:    Patient pain is better controlled, still stating that she has some pain but it is tolerable.    Allergies    No Known Allergies    Intolerances        Code Status:      Full code    PRESENT SYMPTOMS:     Pain: [x ] YES [ ] NO  Onset:   worse over several months                Location:   right parietotemporal region.                       Duration:        constant         Character:    aching        Aggravating factors:  none                       Relieving factors: opioids (iv morphine at this  time)    Radiation: yes, through her head              Timing:          Severity: 5/10    Dyspnea [ ] YES [x ] NO - Mild [ ]  Moderate [ ]  Severe [ ]   Anxiety:  [ ] YES [x ] NO  Fatigue: [x ] YES [ ] NO  Nausea: [ ] YES [ x] NO  Loss of Appetite: [ ] YES [x ] NO  Constipation [ ] YES   [x ] No     OTHER SYMPTOMS:  [x ] All other ROS negative     [ ] Unable to obtain due to poor mentation    Does the patient meet criteria for Severe Protein Calorie Malnutrition?  Yes [ ]  No [ ]   PPSV less than <30% [ ]  Anasarca [ ]  Albumin <2 [ ]  Catabolic State [ ]  Poor nutritional intake [ ]  Significant weight loss [ ]     MEDICATIONS  (STANDING):  dexamethasone     Tablet 4 milliGRAM(s) Oral every 8 hours  docusate sodium 100 milliGRAM(s) Oral three times a day  morphine ER Tablet 15 milliGRAM(s) Oral every 12 hours  pantoprazole    Tablet 40 milliGRAM(s) Oral before breakfast  polyethylene glycol 3350 17 Gram(s) Oral daily  senna 2 Tablet(s) Oral at bedtime  sodium chloride 0.9%. 1000 milliLiter(s) (75 mL/Hr) IV Continuous <Continuous>    MEDICATIONS  (PRN):  bisacodyl Suppository 10 milliGRAM(s) Rectal daily PRN Constipation  morphine  - Injectable 4 milliGRAM(s) IV Push every 3 hours PRN moderate to severe pain  naloxone Injectable 0.1 milliGRAM(s) IV Push every 3 minutes PRN RR<10 or unarouseable      Palliative Performance Status Version 2:         60%    Physical Exam:  General: [x ] Alert,  A&O x3     [ ] lethargic   [ ] Agitated   [ ] Cachexia   HEENT: [ ] Normal   [ ] Dry mouth   [ ] ET Tube    [ ] Trach [x] mass right parietotemporal region.  Lungs: [ x] Clear [ ] Rhonchi  [ ] Crackles [ ] Wheezing [ ] Tachypnea  [ ] Audible excessive secretions    Cardiovascular:  [x ] Regular rate and rhythm  [ ] Irregular [ ] Tachycardia   [ ] Bradycardia   Abdomen: [x ] Soft  [ ] Distended  [ ] +BS  [x ] Non tender [ ] Tender  [ ]PEG   [ ] NGT   Last BM:     Genitourinary: [x ] Normal [ ] Incontinent   [ ] Oliguria/Anuria   [ ] Cortes  Musculoskeletal:  [ ] Normal   [x ] Generalized weakness  [ ] Bedbound  [ ] Edema   Neurological: [x] No focal deficits  [ ] Cognitive impairment     Skin: [ x] Normal   [ ] Pressure ulcers     Vital Signs Last 24 Hrs  T(C): 36.7 (01 Feb 2018 05:55), Max: 37 (31 Jan 2018 14:00)  T(F): 98 (01 Feb 2018 05:55), Max: 98.6 (31 Jan 2018 14:00)  HR: 87 (01 Feb 2018 11:30) (68 - 92)  BP: 122/84 (01 Feb 2018 11:30) (103/58 - 125/67)  BP(mean): --  RR: 16 (01 Feb 2018 11:30) (16 - 18)  SpO2: 99% (01 Feb 2018 11:30) (97% - 100%)    LABS:                        8.4    7.57  )-----------( 174      ( 01 Feb 2018 06:30 )             26.4     01-31    133<L>  |  96<L>  |  7   ----------------------------<  121<H>  4.3   |  24  |  0.24<L>    Ca    8.2<L>      31 Jan 2018 04:28  Phos  1.3     01-31  Mg     2.1     01-31          I&O's Summary      RADIOLOGY & ADDITIONAL STUDIES:

## 2018-02-01 NOTE — DISCHARGE NOTE ADULT - ADDITIONAL INSTRUCTIONS
Please follow up with your PCP within 1-2 weeks of discharge for further management Please follow up with the medical doctors upon arrival to rehab. Please follow up with the medical doctors upon arrival home. Please follow up with Oncologist Dr. Byrd.  Please call to make an appointment.  Please continue radiation therapy for 4 more treatments. Please follow up with the medical doctors upon arrival home. Please follow up with Oncologist Dr. Byrd.  Please call to make an appointment.  Please continue radiation therapy for 4 more treatments. Please follow up with the medical staff with [facility]

## 2018-02-01 NOTE — DISCHARGE NOTE ADULT - CARE PLAN
Principal Discharge DX:	Breast cancer  Assessment and plan of treatment:	Continue with radiation treatment  Secondary Diagnosis:	Appetite loss  Assessment and plan of treatment:	Ensure three times a day  Secondary Diagnosis:	Constipation  Assessment and plan of treatment:	Continue with Senna, Miralax as prescribed  Secondary Diagnosis:	Headache  Assessment and plan of treatment:	Continue with decadron Principal Discharge DX:	Headache  Goal:	continue radiation treatment  Assessment and plan of treatment:	Continue with radiation treatment to complete 10 day at [facility].  Please follow up with the medical doctors upon arrival to rehab. Continue decadron and pain regimen as prescribed. Please follow up with palliative physician Dr. Watson for pain management.  Please call to make an appointment within 1 week of discharge.  Secondary Diagnosis:	Breast cancer  Goal:	continue radiation treatment  Assessment and plan of treatment:	Please follow up with your oncologist Dr. Byrd.  Please call to make an appointment. Continue with radiation treatment to complete 10 day treatments.  Please follow up with the medical doctors upon arrival to rehab.  Secondary Diagnosis:	Constipation  Goal:	Continue bowel regimen  Assessment and plan of treatment:	Continue with Senna, Miralax as prescribed  Secondary Diagnosis:	Appetite loss  Goal:	increase appetite  Assessment and plan of treatment:	Please follow up with the medical doctors upon arrival to rehab.  Continue Ensure Clear TID  Secondary Diagnosis:	Brain metastases  Goal:	continue radiation therapy  Assessment and plan of treatment:	Please follow up with the medical doctors upon arrival to rehab.  Please continue radiation therapy.  Please follow up with Oncologist Dr. Byrd.  Please call to make an appointment. Principal Discharge DX:	Headache  Goal:	continue radiation treatment  Assessment and plan of treatment:	- Continue with radiation treatment to complete 10 day at [facility].    - You have completed 6 treatments 4 more treatments left.   - Please follow up with the medical doctors upon arrival to rehab.   - Continue Decadron and pain regimen as prescribed.   - Please follow up with Palliative physician Dr. Watson for pain management.  Please call to make an appointment within 1 week of discharge.  Secondary Diagnosis:	Breast cancer  Goal:	continue radiation treatment  Assessment and plan of treatment:	- Please follow up with your oncologist Dr. Byrd.   - Please call to make an appointment. Continue with radiation treatment to complete 10 day treatments.  - Please follow up with the medical doctors upon arrival to rehab.  Secondary Diagnosis:	Constipation  Goal:	Continue bowel regimen  Assessment and plan of treatment:	- Continue with Senna, Miralax, Dulcolax as prescribed  - stay well hydrated  Secondary Diagnosis:	Appetite loss  Goal:	increase appetite  Assessment and plan of treatment:	- Please follow up with the medical doctors upon arrival to rehab.  Continue Ensure Clear TID  Secondary Diagnosis:	Brain metastases  Goal:	continue radiation therapy  Assessment and plan of treatment:	- Please follow up with the medical doctors upon arrival to rehab.   - Please continue radiation therapy for 4 more treatments.   - Please follow up with Oncologist Dr. Byrd.  Please call to make an appointment. Principal Discharge DX:	Headache  Goal:	continue radiation treatment  Assessment and plan of treatment:	Please follow up on Monday 2/5/2018.  Radiation Oncology [phone]  - Continue with radiation treatment to complete 10 day at [facility].    - You have completed 6 treatments 4 more treatments left.   - Continue Decadron and pain regimen as prescribed.   - Please follow up with Palliative physician Dr. Watson for pain management.  Please call to make an appointment within 1 week of discharge.  Please follow up with the medical staff with [facility]  Secondary Diagnosis:	Breast cancer  Goal:	continue radiation treatment  Assessment and plan of treatment:	- Please follow up with your oncologist Dr. Byrd.   - Please call to make an appointment. Continue with radiation treatment to complete 10 day treatments.    - Please follow up with the medical staff with [facility]  Secondary Diagnosis:	Constipation  Goal:	Continue bowel regimen  Assessment and plan of treatment:	- Continue with Senna, Miralax, Dulcolax as prescribed  - stay well hydrated  -Please follow up with the medical staff with [facility]  Secondary Diagnosis:	Appetite loss  Goal:	increase appetite  Assessment and plan of treatment:	- Please follow up with the medical staff with [facility] .  Continue Ensure Clear TID  Secondary Diagnosis:	Brain metastases  Goal:	continue radiation therapy  Assessment and plan of treatment:	- Please follow up with the medical staff with [facility]   - Please continue radiation therapy for 4 more treatments.   - Please follow up with Oncologist Dr. Byrd.  Please call to make an appointment.

## 2018-02-01 NOTE — CONSULT NOTE ADULT - PROBLEM SELECTOR RECOMMENDATION 2
Metastatic breast cancer. Mets to brain, lung, and bone. ECOG 3.   - Discussed with patient that during prior hospitalization in October 2017, hospice was recommended to the patient due to progressive disease which has not responded to therapy and poor performance status  - Discussed with patient that her performance status is poor and that she would not be a candidate for cytotoxic chemotherapy.   - Discussed hospice care with patient which she had refused during prior admission. Discussed the benefits of palliative/hospice care. Patient still unwilling to commit to hospice. Wishes to discuss with family.   - No plans for further disease modifying chemotherapy treatment.   - Will follow intermittently.     Sharan Banuelos MD  [facility] - A Division of [facility]   [address] [place] [id number] (T) [phone] (F) [phone]
Patient currently getting RT to Brain. Appreciate Radiation oncology involvement.

## 2018-02-01 NOTE — DIETITIAN INITIAL EVALUATION ADULT. - OTHER INFO
Pt with hx of breast cancer stage IV s/p craniotomy x5, chemo, currently on radiation presenting with headaches 2/2 mets. Received nutrition consult for Assessment. Pt reports appetite is somewhat improved, remains with nausea and vomiting post RT and difficulty having regular bowel movements. Denies chewing, swallowing difficulties. Pt is Vegan, reviewed menu with Pt and noted food preferences. Family is also bringing in foods from home. Pt is willing to try Ensure Clear during this hospitalization.

## 2018-02-01 NOTE — PROGRESS NOTE ADULT - PROBLEM SELECTOR PLAN 1
2/2 stage IV breast cancer metastasis-previously admitted no neurosurgical intervention recommended Rad Onc   -heme Onc consult- no further treatment recommend palliative care and hospice   -palliative care consult for pain management-recs appreciated to optimize pain regimen   - Continue decadron 4mg q8hrs with PPI  - s/p radiation treatment   - Fall, aspiration, seizure precautions 2/2 stage IV breast cancer metastasis-previously admitted no neurosurgical intervention recommended Rad Onc treatment  -heme Onc consult- no further treatment recommend palliative care and hospice   -palliative care consult for pain management-recs appreciated to optimize pain regimen  - Continue decadron 4mg q8hrs with PPI  - s/p radiation treatment   - Fall, aspiration, seizure precautions

## 2018-02-02 LAB
BASOPHILS # BLD AUTO: 0 K/UL — SIGNIFICANT CHANGE UP (ref 0–0.2)
BASOPHILS NFR BLD AUTO: 0 % — SIGNIFICANT CHANGE UP (ref 0–2)
BUN SERPL-MCNC: 5 MG/DL — LOW (ref 7–23)
CALCIUM SERPL-MCNC: 8 MG/DL — LOW (ref 8.4–10.5)
CHLORIDE SERPL-SCNC: 95 MMOL/L — LOW (ref 98–107)
CO2 SERPL-SCNC: 25 MMOL/L — SIGNIFICANT CHANGE UP (ref 22–31)
CREAT SERPL-MCNC: 0.23 MG/DL — LOW (ref 0.5–1.3)
EOSINOPHIL # BLD AUTO: 0 K/UL — SIGNIFICANT CHANGE UP (ref 0–0.5)
EOSINOPHIL NFR BLD AUTO: 0 % — SIGNIFICANT CHANGE UP (ref 0–6)
GLUCOSE SERPL-MCNC: 134 MG/DL — HIGH (ref 70–99)
HCT VFR BLD CALC: 32.7 % — LOW (ref 34.5–45)
HGB BLD-MCNC: 10 G/DL — LOW (ref 11.5–15.5)
IMM GRANULOCYTES # BLD AUTO: 0.08 # — SIGNIFICANT CHANGE UP
IMM GRANULOCYTES NFR BLD AUTO: 0.9 % — SIGNIFICANT CHANGE UP (ref 0–1.5)
LYMPHOCYTES # BLD AUTO: 0.52 K/UL — LOW (ref 1–3.3)
LYMPHOCYTES # BLD AUTO: 5.6 % — LOW (ref 13–44)
MAGNESIUM SERPL-MCNC: 2.1 MG/DL — SIGNIFICANT CHANGE UP (ref 1.6–2.6)
MCHC RBC-ENTMCNC: 25.9 PG — LOW (ref 27–34)
MCHC RBC-ENTMCNC: 30.6 % — LOW (ref 32–36)
MCV RBC AUTO: 84.7 FL — SIGNIFICANT CHANGE UP (ref 80–100)
MONOCYTES # BLD AUTO: 0.42 K/UL — SIGNIFICANT CHANGE UP (ref 0–0.9)
MONOCYTES NFR BLD AUTO: 4.5 % — SIGNIFICANT CHANGE UP (ref 2–14)
NEUTROPHILS # BLD AUTO: 8.3 K/UL — HIGH (ref 1.8–7.4)
NEUTROPHILS NFR BLD AUTO: 89 % — HIGH (ref 43–77)
NRBC # FLD: 0 — SIGNIFICANT CHANGE UP
PHOSPHATE SERPL-MCNC: 2 MG/DL — LOW (ref 2.5–4.5)
PLATELET # BLD AUTO: 244 K/UL — SIGNIFICANT CHANGE UP (ref 150–400)
PMV BLD: 10.2 FL — SIGNIFICANT CHANGE UP (ref 7–13)
POTASSIUM SERPL-MCNC: 4 MMOL/L — SIGNIFICANT CHANGE UP (ref 3.5–5.3)
POTASSIUM SERPL-SCNC: 4 MMOL/L — SIGNIFICANT CHANGE UP (ref 3.5–5.3)
RBC # BLD: 3.86 M/UL — SIGNIFICANT CHANGE UP (ref 3.8–5.2)
RBC # FLD: 19.9 % — HIGH (ref 10.3–14.5)
SODIUM SERPL-SCNC: 133 MMOL/L — LOW (ref 135–145)
WBC # BLD: 9.32 K/UL — SIGNIFICANT CHANGE UP (ref 3.8–10.5)
WBC # FLD AUTO: 9.32 K/UL — SIGNIFICANT CHANGE UP (ref 3.8–10.5)

## 2018-02-02 PROCEDURE — 99232 SBSQ HOSP IP/OBS MODERATE 35: CPT

## 2018-02-02 RX ORDER — POLYETHYLENE GLYCOL 3350 17 G/17G
17 POWDER, FOR SOLUTION ORAL
Qty: 0 | Refills: 0 | COMMUNITY
Start: 2018-02-02

## 2018-02-02 RX ORDER — MORPHINE SULFATE 50 MG/1
1 CAPSULE, EXTENDED RELEASE ORAL
Qty: 0 | Refills: 0 | COMMUNITY
Start: 2018-02-02

## 2018-02-02 RX ORDER — PANTOPRAZOLE SODIUM 20 MG/1
1 TABLET, DELAYED RELEASE ORAL
Qty: 0 | Refills: 0 | COMMUNITY
Start: 2018-02-02

## 2018-02-02 RX ORDER — DEXAMETHASONE 0.5 MG/5ML
1 ELIXIR ORAL
Qty: 0 | Refills: 0 | COMMUNITY
Start: 2018-02-02

## 2018-02-02 RX ADMIN — POLYETHYLENE GLYCOL 3350 17 GRAM(S): 17 POWDER, FOR SOLUTION ORAL at 14:15

## 2018-02-02 RX ADMIN — MORPHINE SULFATE 15 MILLIGRAM(S): 50 CAPSULE, EXTENDED RELEASE ORAL at 11:00

## 2018-02-02 RX ADMIN — MORPHINE SULFATE 15 MILLIGRAM(S): 50 CAPSULE, EXTENDED RELEASE ORAL at 22:50

## 2018-02-02 RX ADMIN — Medication 100 MILLIGRAM(S): at 05:33

## 2018-02-02 RX ADMIN — Medication 100 MILLIGRAM(S): at 14:15

## 2018-02-02 RX ADMIN — Medication 4 MILLIGRAM(S): at 15:57

## 2018-02-02 RX ADMIN — MORPHINE SULFATE 15 MILLIGRAM(S): 50 CAPSULE, EXTENDED RELEASE ORAL at 05:32

## 2018-02-02 RX ADMIN — MORPHINE SULFATE 15 MILLIGRAM(S): 50 CAPSULE, EXTENDED RELEASE ORAL at 03:40

## 2018-02-02 RX ADMIN — MORPHINE SULFATE 15 MILLIGRAM(S): 50 CAPSULE, EXTENDED RELEASE ORAL at 18:00

## 2018-02-02 RX ADMIN — SENNA PLUS 2 TABLET(S): 8.6 TABLET ORAL at 22:04

## 2018-02-02 RX ADMIN — MORPHINE SULFATE 15 MILLIGRAM(S): 50 CAPSULE, EXTENDED RELEASE ORAL at 22:04

## 2018-02-02 RX ADMIN — Medication 4 MILLIGRAM(S): at 09:26

## 2018-02-02 RX ADMIN — MORPHINE SULFATE 15 MILLIGRAM(S): 50 CAPSULE, EXTENDED RELEASE ORAL at 17:07

## 2018-02-02 RX ADMIN — MORPHINE SULFATE 15 MILLIGRAM(S): 50 CAPSULE, EXTENDED RELEASE ORAL at 16:56

## 2018-02-02 RX ADMIN — MORPHINE SULFATE 15 MILLIGRAM(S): 50 CAPSULE, EXTENDED RELEASE ORAL at 02:43

## 2018-02-02 RX ADMIN — MORPHINE SULFATE 15 MILLIGRAM(S): 50 CAPSULE, EXTENDED RELEASE ORAL at 15:57

## 2018-02-02 RX ADMIN — PANTOPRAZOLE SODIUM 40 MILLIGRAM(S): 20 TABLET, DELAYED RELEASE ORAL at 05:33

## 2018-02-02 RX ADMIN — MORPHINE SULFATE 15 MILLIGRAM(S): 50 CAPSULE, EXTENDED RELEASE ORAL at 12:00

## 2018-02-02 RX ADMIN — Medication 100 MILLIGRAM(S): at 22:04

## 2018-02-02 NOTE — PROGRESS NOTE ADULT - PROBLEM SELECTOR PROBLEM 4
Constipation
Need for prophylactic measure
Constipation
Need for prophylactic measure
Need for prophylactic measure

## 2018-02-02 NOTE — PROGRESS NOTE ADULT - SUBJECTIVE AND OBJECTIVE BOX
Patient is a 57y old  Female who presents with a chief complaint of headache (01 Feb 2018 21:27)      SUBJECTIVE / OVERNIGHT EVENTS: Pt re-evaluated by PT rec rehab. denies SOB/N/V/D    MEDICATIONS  (STANDING):  dexamethasone     Tablet 4 milliGRAM(s) Oral every 8 hours  docusate sodium 100 milliGRAM(s) Oral three times a day  morphine ER Tablet 15 milliGRAM(s) Oral every 12 hours  pantoprazole    Tablet 40 milliGRAM(s) Oral before breakfast  polyethylene glycol 3350 17 Gram(s) Oral daily  senna 2 Tablet(s) Oral at bedtime    MEDICATIONS  (PRN):  bisacodyl Suppository 10 milliGRAM(s) Rectal daily PRN Constipation  morphine  IR 15 milliGRAM(s) Oral every 4 hours PRN Moderate to severe pain  naloxone Injectable 0.1 milliGRAM(s) IV Push every 3 minutes PRN RR<10 or unarouseable        CAPILLARY BLOOD GLUCOSE        I&O's Summary      T(C): 36.7 (02-02-18 @ 05:30), Max: 36.7 (02-01-18 @ 22:23)  HR: 92 (02-02-18 @ 05:30) (75 - 92)  BP: 123/80 (02-02-18 @ 05:30) (116/80 - 123/86)  RR: 17 (02-02-18 @ 05:30) (16 - 17)  SpO2: 97% (02-02-18 @ 05:30) (97% - 99%)    PHYSICAL EXAM:  GENERAL: NAD, well-developed  HEAD:  Atraumatic, Normocephalic  EYES: EOMI, PERRLA, conjunctiva and sclera clear  NECK: Supple, No JVD  CHEST/LUNG: Clear to auscultation bilaterally; No wheeze  HEART: Regular rate and rhythm; No murmurs, rubs, or gallops  ABDOMEN: Soft, Nontender, Nondistended; Bowel sounds present  EXTREMITIES:  2+ Peripheral Pulses, No clubbing, cyanosis, or edema  PSYCH: AAOx3  NEUROLOGY: non-focal  SKIN: No rashes or lesions    LABS:                        10.0   9.32  )-----------( 244      ( 02 Feb 2018 05:15 )             32.7     02-02    133<L>  |  95<L>  |  5<L>  ----------------------------<  134<H>  4.0   |  25  |  0.23<L>    Ca    8.0<L>      02 Feb 2018 05:15  Phos  2.0     02-02  Mg     2.1     02-02                  RADIOLOGY & ADDITIONAL TESTS:    Imaging Personally Reviewed:    Consultant(s) Notes Reviewed:      Care Discussed with Consultants/Other Providers:

## 2018-02-02 NOTE — PROGRESS NOTE ADULT - PROBLEM SELECTOR PLAN 6
Discussion had with patient about her overall prognosis and she understands that there is no more disease modifying therapy. She would like to go home and be surrounded with her loved ones. Hospice was discussed with her in person and her daughter over the phone, it seemed that they were interested. Hospice referral made. Discussion had with patient about her overall prognosis and she understands that there is no more disease modifying therapy. She would like to go home and be surrounded with her loved ones. Hospice was discussed with her in person and her daughter over the phone, it seemed that they were interested. Hospice referral made.  Continued emotional support and rapport building

## 2018-02-02 NOTE — PROGRESS NOTE ADULT - PROBLEM SELECTOR PLAN 3
Continue MsContin 15mg BID, with MSIR 15mg q4h PRN. Continue MsContin 15mg BID, with MSIR 15mg q4h PRN.  May need to uptitrate if 4 or greater prns are used in a 24 hour window

## 2018-02-02 NOTE — PROGRESS NOTE ADULT - SUBJECTIVE AND OBJECTIVE BOX
INTERVAL HPI/OVERNIGHT EVENTS:    Patient pain is controlled with the Mscontin and MSIR PRN. No complaints at this time. Patient has 4 more RT sessions after today.     Allergies    No Known Allergies    Intolerances        Code Status:      [place] dumont    PRESENT SYMPTOMS:   SOURCE:  [x ] Patient   [ ] Family   [ ] Team     Pain: 4/10, on her right temporal area, described as aching and controlled with her medication    Dyspnea [ ] YES [ x] NO - Mild [ ]  Moderate [ ]  Severe [ ]   Anxiety:  [ ] YES [ x] NO  Fatigue: [ x] YES [ ] NO  Nausea: [ ] YES [x ] NO  Loss of Appetite: [ ] YES [x ] NO  Constipation [ ] YES   [x ] No     OTHER SYMPTOMS:  [x ] All other ROS negative     [ ] Unable to obtain due to poor mentation    Does the patient meet criteria for Severe Protein Calorie Malnutrition?  Yes [ ]  No [x]   PPSV less than <30% [ ]  Anasarca [ ]  Albumin <2 [ ]  Catabolic State [ ]  Poor nutritional intake [ ]  Significant weight loss [ ]     MEDICATIONS  (STANDING):  dexamethasone     Tablet 4 milliGRAM(s) Oral every 8 hours  docusate sodium 100 milliGRAM(s) Oral three times a day  morphine ER Tablet 15 milliGRAM(s) Oral every 12 hours  pantoprazole    Tablet 40 milliGRAM(s) Oral before breakfast  polyethylene glycol 3350 17 Gram(s) Oral daily  senna 2 Tablet(s) Oral at bedtime    MEDICATIONS  (PRN):  bisacodyl Suppository 10 milliGRAM(s) Rectal daily PRN Constipation  morphine  IR 15 milliGRAM(s) Oral every 4 hours PRN Moderate to severe pain  naloxone Injectable 0.1 milliGRAM(s) IV Push every 3 minutes PRN RR<10 or unarouseable      Palliative Performance Status Version 2:        60 %      Physical Exam:    General: [x ] Alert,  A&O x3     [ ] lethargic   [ ] Agitated   [ ] Cachexia   HEENT: [ ] Normal   [ ] Dry mouth   [ ] ET Tube    [ ] Trach [x] mass right parietotemporal region.  Lungs: [ x] Clear [ ] Rhonchi  [ ] Crackles [ ] Wheezing [ ] Tachypnea  [ ] Audible excessive secretions    Cardiovascular:  [x ] Regular rate and rhythm  [ ] Irregular [ ] Tachycardia   [ ] Bradycardia   Abdomen: [x ] Soft  [ ] Distended  [ ] +BS  [x ] Non tender [ ] Tender  [ ]PEG   [ ] NGT   Last BM:     Genitourinary: [x ] Normal [ ] Incontinent   [ ] Oliguria/Anuria   [ ] Cortes  Musculoskeletal:  [ ] Normal   [x ] Generalized weakness  [ ] Bedbound  [ ] Edema   Neurological: [x] No focal deficits  [ ] Cognitive impairment     Skin: [ x] Normal   [ ] Pressure ulcers     Vital Signs Last 24 Hrs  T(C): 36.7 (02 Feb 2018 05:30), Max: 36.7 (01 Feb 2018 22:23)  T(F): 98 (02 Feb 2018 05:30), Max: 98 (01 Feb 2018 22:23)  HR: 92 (02 Feb 2018 05:30) (75 - 92)  BP: 123/80 (02 Feb 2018 05:30) (123/80 - 123/86)  BP(mean): --  RR: 17 (02 Feb 2018 05:30) (16 - 17)  SpO2: 97% (02 Feb 2018 05:30) (97% - 98%)    LABS:                        10.0   9.32  )-----------( 244      ( 02 Feb 2018 05:15 )             32.7     02-02    133<L>  |  95<L>  |  5<L>  ----------------------------<  134<H>  4.0   |  25  |  0.23<L>    Ca    8.0<L>      02 Feb 2018 05:15  Phos  2.0     02-02  Mg     2.1     02-02          I&O's Summary      RADIOLOGY & ADDITIONAL STUDIES: INTERVAL HPI/OVERNIGHT EVENTS:    Patient pain is controlled with the Mscontin and MSIR PRN. No complaints at this time. Patient has 4 more RT sessions after today.     Allergies    No Known Allergies    Intolerances        Code Status:      Full code    PRESENT SYMPTOMS:   SOURCE:  [x ] Patient   [ ] Family   [ ] Team     Pain: 4/10, on her right temporal area, described as aching and controlled with her medication    Dyspnea [ ] YES [ x] NO - Mild [ ]  Moderate [ ]  Severe [ ]   Anxiety:  [ ] YES [ x] NO  Fatigue: [ x] YES [ ] NO  Nausea: [ ] YES [x ] NO  Loss of Appetite: [ ] YES [x ] NO  Constipation [ ] YES   [x ] No     OTHER SYMPTOMS:  [x ] All other ROS negative     [ ] Unable to obtain due to poor mentation    Does the patient meet criteria for Severe Protein Calorie Malnutrition?  Yes [ ]  No [x]   PPSV less than <30% [ ]  Anasarca [ ]  Albumin <2 [ ]  Catabolic State [ ]  Poor nutritional intake [ ]  Significant weight loss [ ]     MEDICATIONS  (STANDING):  dexamethasone     Tablet 4 milliGRAM(s) Oral every 8 hours  docusate sodium 100 milliGRAM(s) Oral three times a day  morphine ER Tablet 15 milliGRAM(s) Oral every 12 hours  pantoprazole    Tablet 40 milliGRAM(s) Oral before breakfast  polyethylene glycol 3350 17 Gram(s) Oral daily  senna 2 Tablet(s) Oral at bedtime    MEDICATIONS  (PRN):  bisacodyl Suppository 10 milliGRAM(s) Rectal daily PRN Constipation  morphine  IR 15 milliGRAM(s) Oral every 4 hours PRN Moderate to severe pain  naloxone Injectable 0.1 milliGRAM(s) IV Push every 3 minutes PRN RR<10 or unarouseable      Palliative Performance Status Version 2:        60 %      Physical Exam:    General: [x ] Alert,  A&O x3     [ ] lethargic   [ ] Agitated   [ ] Cachexia   HEENT: [ ] Normal   [ ] Dry mouth   [ ] ET Tube    [ ] Trach [x] mass right parietotemporal region.  Lungs: [ x] Clear [ ] Rhonchi  [ ] Crackles [ ] Wheezing [ ] Tachypnea  [ ] Audible excessive secretions    Cardiovascular:  [x ] Regular rate and rhythm  [ ] Irregular [ ] Tachycardia   [ ] Bradycardia   Abdomen: [x ] Soft  [ ] Distended  [ ] +BS  [x ] Non tender [ ] Tender  [ ]PEG   [ ] NGT   Last BM:     Genitourinary: [x ] Normal [ ] Incontinent   [ ] Oliguria/Anuria   [ ] Cortes  Musculoskeletal:  [ ] Normal   [x ] Generalized weakness  [ ] Bedbound  [ ] Edema   Neurological: [x] No focal deficits  [ ] Cognitive impairment     Skin: [ x] Normal   [ ] Pressure ulcers     Vital Signs Last 24 Hrs  T(C): 36.7 (02 Feb 2018 05:30), Max: 36.7 (01 Feb 2018 22:23)  T(F): 98 (02 Feb 2018 05:30), Max: 98 (01 Feb 2018 22:23)  HR: 92 (02 Feb 2018 05:30) (75 - 92)  BP: 123/80 (02 Feb 2018 05:30) (123/80 - 123/86)  BP(mean): --  RR: 17 (02 Feb 2018 05:30) (16 - 17)  SpO2: 97% (02 Feb 2018 05:30) (97% - 98%)    LABS:                        10.0   9.32  )-----------( 244      ( 02 Feb 2018 05:15 )             32.7     02-02    133<L>  |  95<L>  |  5<L>  ----------------------------<  134<H>  4.0   |  25  |  0.23<L>    Ca    8.0<L>      02 Feb 2018 05:15  Phos  2.0     02-02  Mg     2.1     02-02          I&O's Summary      RADIOLOGY & ADDITIONAL STUDIES:

## 2018-02-02 NOTE — PROGRESS NOTE ADULT - PROBLEM SELECTOR PLAN 1
Patient seen by oncologist at [facility] - Dr. Byrd. As per oncologist, no more disease modifying treatment offered. Patient seen by oncologist at [facility] - Dr. Byrd. As per oncologist, no more disease modifying treatment offered.  Potential of hospice referral post discharge if she leaves over the weekend

## 2018-02-02 NOTE — PROGRESS NOTE ADULT - PROBLEM SELECTOR PROBLEM 2
Brain metastases
Constipation due to opioid therapy
Brain metastases
Constipation due to opioid therapy
Constipation due to opioid therapy

## 2018-02-02 NOTE — PROGRESS NOTE ADULT - PROBLEM SELECTOR PLAN 1
2/2 stage IV breast cancer metastasis-previously admitted no neurosurgical intervention recommended Rad Onc treatment  -case d/w SW and arabella daughter [phone] in regards to PT recs-rehab will need 5 more radiation tx and also discussed Heme onc recs no further treatement and  radiation is palliative not curative is aware of prognosis. Family has spoken to hospice care and will work on coordinating care when she returns home.   -currently on MS contin w/ MS IR PRN for pain  - Continue decadron 4mg q8hrs with PPI  - radiation treatment today  - Fall, aspiration, seizure precautions

## 2018-02-02 NOTE — PROGRESS NOTE ADULT - PROBLEM SELECTOR PLAN 4
Continue aggressive Bowel regimen given on opioids. Continue aggressive Bowel regimen given on opioids.  Add dulcolax suppository

## 2018-02-02 NOTE — PROGRESS NOTE ADULT - ATTENDING COMMENTS
discharge 36 min
Annotations above
Palliative Medicine Attending  Pt seen and examined  No sedation or bradypnea  Improvement on morphine ER 15mg q12H  Continue with senna and miralax  Having BM  Primary team to coordinate discharge, potential plan for [place] during RT completion, and address discharge planning

## 2018-02-02 NOTE — PROGRESS NOTE ADULT - PROBLEM SELECTOR PLAN 2
Patient currently getting RT to Brain. Appreciate Radiation oncology involvement. Patient currently getting RT to Brain.   Transcranial lesion  Appreciate Radiation oncology involvement.

## 2018-02-02 NOTE — PROGRESS NOTE ADULT - PROBLEM SELECTOR PLAN 3
-likely secondary to worsening malignancy  -nutrition consult-severe protein calorie malnutrition-ensure supplements

## 2018-02-03 VITALS
HEART RATE: 83 BPM | SYSTOLIC BLOOD PRESSURE: 120 MMHG | DIASTOLIC BLOOD PRESSURE: 82 MMHG | RESPIRATION RATE: 17 BRPM | OXYGEN SATURATION: 98 % | TEMPERATURE: 98 F

## 2018-02-03 PROCEDURE — 99239 HOSP IP/OBS DSCHRG MGMT >30: CPT

## 2018-02-03 RX ORDER — MORPHINE SULFATE 50 MG/1
1 CAPSULE, EXTENDED RELEASE ORAL
Qty: 42 | Refills: 0 | OUTPATIENT
Start: 2018-02-03 | End: 2018-02-09

## 2018-02-03 RX ORDER — OXYCODONE HYDROCHLORIDE 5 MG/1
0 TABLET ORAL
Qty: 0 | Refills: 0 | COMMUNITY

## 2018-02-03 RX ORDER — PANTOPRAZOLE SODIUM 20 MG/1
1 TABLET, DELAYED RELEASE ORAL
Qty: 30 | Refills: 0 | OUTPATIENT
Start: 2018-02-03 | End: 2018-03-04

## 2018-02-03 RX ORDER — SENNA PLUS 8.6 MG/1
2 TABLET ORAL
Qty: 60 | Refills: 0 | OUTPATIENT
Start: 2018-02-03 | End: 2018-03-04

## 2018-02-03 RX ORDER — POLYETHYLENE GLYCOL 3350 17 G/17G
17 POWDER, FOR SOLUTION ORAL
Qty: 30 | Refills: 0 | OUTPATIENT
Start: 2018-02-03 | End: 2018-03-04

## 2018-02-03 RX ORDER — DOCUSATE SODIUM 100 MG
1 CAPSULE ORAL
Qty: 90 | Refills: 0 | OUTPATIENT
Start: 2018-02-03 | End: 2018-03-04

## 2018-02-03 RX ORDER — DEXAMETHASONE 0.5 MG/5ML
1 ELIXIR ORAL
Qty: 90 | Refills: 0 | OUTPATIENT
Start: 2018-02-03 | End: 2018-03-04

## 2018-02-03 RX ORDER — MORPHINE SULFATE 50 MG/1
1 CAPSULE, EXTENDED RELEASE ORAL
Qty: 14 | Refills: 0 | OUTPATIENT
Start: 2018-02-03 | End: 2018-02-09

## 2018-02-03 RX ADMIN — Medication 4 MILLIGRAM(S): at 00:08

## 2018-02-03 RX ADMIN — MORPHINE SULFATE 15 MILLIGRAM(S): 50 CAPSULE, EXTENDED RELEASE ORAL at 13:01

## 2018-02-03 RX ADMIN — POLYETHYLENE GLYCOL 3350 17 GRAM(S): 17 POWDER, FOR SOLUTION ORAL at 13:01

## 2018-02-03 RX ADMIN — MORPHINE SULFATE 15 MILLIGRAM(S): 50 CAPSULE, EXTENDED RELEASE ORAL at 03:11

## 2018-02-03 RX ADMIN — Medication 4 MILLIGRAM(S): at 08:49

## 2018-02-03 RX ADMIN — Medication 4 MILLIGRAM(S): at 16:49

## 2018-02-03 RX ADMIN — Medication 100 MILLIGRAM(S): at 06:12

## 2018-02-03 RX ADMIN — MORPHINE SULFATE 15 MILLIGRAM(S): 50 CAPSULE, EXTENDED RELEASE ORAL at 04:10

## 2018-02-03 RX ADMIN — MORPHINE SULFATE 15 MILLIGRAM(S): 50 CAPSULE, EXTENDED RELEASE ORAL at 06:11

## 2018-02-03 RX ADMIN — PANTOPRAZOLE SODIUM 40 MILLIGRAM(S): 20 TABLET, DELAYED RELEASE ORAL at 06:12

## 2018-02-03 RX ADMIN — Medication 100 MILLIGRAM(S): at 13:00

## 2018-02-03 NOTE — PROGRESS NOTE ADULT - PROVIDER SPECIALTY LIST ADULT
Hospitalist
Internal Medicine
Internal Medicine
Palliative Care
Rad Onc
Internal Medicine
Palliative Care

## 2018-02-03 NOTE — PROGRESS NOTE ADULT - SUBJECTIVE AND OBJECTIVE BOX
Patient is a 57y old  Female who presents with a chief complaint of headache   patient seen and examine at bed side  no acute issure       MEDICATIONS  (STANDING):  dexamethasone     Tablet 4 milliGRAM(s) Oral every 8 hours  docusate sodium 100 milliGRAM(s) Oral three times a day  morphine ER Tablet 15 milliGRAM(s) Oral every 12 hours  pantoprazole    Tablet 40 milliGRAM(s) Oral before breakfast  polyethylene glycol 3350 17 Gram(s) Oral daily  senna 2 Tablet(s) Oral at bedtime    MEDICATIONS  (PRN):  bisacodyl Suppository 10 milliGRAM(s) Rectal daily PRN Constipation  morphine  IR 15 milliGRAM(s) Oral every 4 hours PRN Moderate to severe pain  naloxone Injectable 0.1 milliGRAM(s) IV Push every 3 minutes PRN RR<10 or unarouseable      	  Vital Signs Last 24 Hrs  T(C): 36.4 (03 Feb 2018 13:15), Max: 36.7 (03 Feb 2018 06:07)  T(F): 97.5 (03 Feb 2018 13:15), Max: 98 (03 Feb 2018 06:07)  HR: 83 (03 Feb 2018 13:15) (75 - 83)  BP: 120/82 (03 Feb 2018 13:15) (120/82 - 128/73)  BP(mean): --  RR: 17 (03 Feb 2018 13:15) (16 - 17)  SpO2: 98% (03 Feb 2018 13:15) (97% - 100%)          PHYSICAL EXAM:  GENERAL: NAD, well-developed  HEAD:  Atraumatic, Normocephalic  EYES: EOMI, PERRLA, conjunctiva and sclera clear  NECK: Supple, No JVD  CHEST/LUNG: Clear to auscultation bilaterally; No wheeze  HEART: Regular rate and rhythm; No murmurs, rubs, or gallops  ABDOMEN: Soft, Nontender, Nondistended; Bowel sounds present  EXTREMITIES:  2+ Peripheral Pulses, No clubbing, cyanosis, or edema  PSYCH: AAOx3  NEUROLOGY: non-focal  SKIN: No rashes or lesions    LABS:                                         10.0   9.32  )-----------( 244      ( 02 Feb 2018 05:15 )             32.7       02-02    133<L>  |  95<L>  |  5<L>  ----------------------------<  134<H>  4.0   |  25  |  0.23<L>    Ca    8.0<L>      02 Feb 2018 05:15  Phos  2.0     02-02  Mg     2.1     02-02                    :

## 2018-02-03 NOTE — PROGRESS NOTE ADULT - ASSESSMENT
56 yo Female with hx of breast cancer stage IV s/p craniotomy x5, chemo, currently on radiation presenting with headaches 2/2 mets
56 yo Female with hx of breast cancer stage IV s/p craniotomy x5, chemo, currently on radiation presenting with headaches 2/2 mets      1. Headache.  Plan: 2/2 stage IV breast cancer metastasis-previously admitted no neurosurgical intervention recommended Rad Onc treatment    - Continue decadron 4mg q8hrs with PPI  dc home with home hospice      2. Constipation due to opioid therapy.     No signs of obstruction   -bowel regimen.           dc home with home hospice today
57 year old woman with Stage IV breast cancer, brain metastases neoplasm pain, constipation, debility and encounter for palliative care.
57 year old woman with Stage IV breast cancer, brain metastases, neoplasm pain, constipation, debility and encounter for palliative care.

## 2018-02-09 PROCEDURE — 77427 RADIATION TX MANAGEMENT X5: CPT

## 2018-06-25 NOTE — DISCHARGE NOTE ADULT - FUNCTIONAL SCREEN CURRENT LEVEL: DRESSING, MLM
(0) independent
How Severe Are Your Spot(S)?: mild
Have Your Spot(S) Been Treated In The Past?: has not been treated
Hpi Title: Evaluation of Skin Lesions

## 2018-07-04 NOTE — ED PROVIDER NOTE - CROS ED HEME ALL NEG
[facility]  Gastroenterology  Progress Note    Patient Name: Jessica Leos  MRN: [id number]  Admission Date: 7/3/2018  Hospital Length of Stay: 0 days  Code Status: Full Code   Attending Provider: Abebe Tang MD  Consulting Provider: Angelica Aranda MD  Primary Care Physician: Von Rios MD  Principal Problem: Acute hepatitis      Subjective:     Interval History: No acute issues. Overall feeling better but still weak. She has been tolerating PO meds. Labs checked twice today and stable. No confusion.    Review of Systems   Constitutional: Positive for activity change, appetite change and fatigue.   HENT: Negative.    Eyes: Negative.    Respiratory: Negative.    Cardiovascular: Negative.    Gastrointestinal: Negative.    Genitourinary: Negative.    Musculoskeletal: Negative.    Skin: Positive for color change.   Neurological: Negative.    Psychiatric/Behavioral: Negative.      Objective:     Vital Signs (Most Recent):  Temp: 98.4 °F (36.9 °C) (07/04/18 1609)  Pulse: (!) 59 (07/04/18 1609)  Resp: 16 (07/04/18 1609)  BP: (!) 104/57 (07/04/18 1609)  SpO2: 97 % (07/04/18 1609) Vital Signs (24h Range):  Temp:  [97.4 °F (36.3 °C)-98.4 °F (36.9 °C)] 98.4 °F (36.9 °C)  Pulse:  [59-80] 59  Resp:  [16-22] 16  SpO2:  [94 %-100 %] 97 %  BP: (102-124)/(57-78) 104/57     Weight: 94.7 kg (208 lb 12.4 oz) (07/04/18 0718)  Body mass index is 42.17 kg/m².      Intake/Output Summary (Last 24 hours) at 07/04/18 1733  Last data filed at 07/04/18 0500   Gross per 24 hour   Intake              472 ml   Output                0 ml   Net              472 ml       Lines/Drains/Airways     Peripheral Intravenous Line                 Peripheral IV - Single Lumen 07/03/18 1157 Left Hand 1 day                Physical Exam   Constitutional: She is oriented to person, place, and time. She appears well-developed and well-nourished. No distress.   HENT:   Head: Normocephalic and atraumatic.   Mouth/Throat: Oropharynx is clear  and moist. No oropharyngeal exudate.   Eyes: Pupils are equal, round, and reactive to light. Right eye exhibits no discharge. Left eye exhibits no discharge. Scleral icterus is present.   Pulmonary/Chest: Effort normal and breath sounds normal. No respiratory distress. She has no wheezes.   Abdominal: Soft. She exhibits no distension. There is no tenderness.   Musculoskeletal: She exhibits no edema.   Neurological: She is alert and oriented to person, place, and time.   Skin:   jaundiced   Psychiatric: She has a normal mood and affect. Her behavior is normal.   Vitals reviewed.      Significant Labs:  CBC:   Recent Labs  Lab 07/03/18  1316   WBC 7.46   HGB 14.3   HCT 40.9        CMP:   Recent Labs  Lab 07/04/18  1600   GLU 93   CALCIUM 8.6*   ALBUMIN 2.5*   PROT 5.7*   *   K 4.2   CO2 25      BUN 6   CREATININE 0.7   ALKPHOS 215*   ALT 1,110*   AST 1,751*   BILITOT 11.3*     Coagulation:   Recent Labs  Lab 07/03/18  1316  07/04/18  1600   INR 1.2  < > 1.3*   APTT 32.1*  --   --    < > = values in this interval not displayed.      Significant Imaging:  n/a    Assessment/Plan:     * Acute hepatitis    37 yo female with acute hepatitis. Suspect hepatitis B because of sexual history.  Liver tests have been stable on 2 checks today and no evidence of encephalopathy.  No concern for liver failure at this time.  Suspect she will have full liver recovery.  Hepatitis panel pending.   Check autoimmune labs.   Discussed transmission precautions.   Discuss signs of encephalopathy that she should look out for that would warrant immediate return to emergency department although I think this is low risk.  Set message to GI clinic to have patient get labs done with close monitoring as well as close follow-up appointment  She is okay for discharge            Thank you for your consult. I will sign off. Please contact us if you have any additional questions.    Angelica Aranda MD  Gastroenterology  [facility]   - - -

## 2018-07-16 PROBLEM — C50.919 MALIGNANT NEOPLASM OF UNSPECIFIED SITE OF UNSPECIFIED FEMALE BREAST: Chronic | Status: INACTIVE | Noted: 2017-03-14 | Resolved: 2017-07-28

## 2018-09-25 NOTE — ED ADULT NURSE NOTE - PLAN OF CARE
Hpi Title: Evaluation of Skin Lesions
Additional History: Patient is here for a full skin exam
Explanation of exam/test

## 2019-03-14 NOTE — ED PROVIDER NOTE - ASSOCIATED PAIN OR INJURY
----- Message from YOLI Lee sent at 3/14/2019  1:33 PM CDT -----  Culture did not show strep throat   no discrete location documentation necessary

## 2019-09-13 NOTE — ED PROVIDER NOTE - NS ED MD EM SELECTION
Valacyclovir HCl (VALTREX) 1000 MG Tab 30 tablet     Last refill 6/28/19  Last office visit 8/05/19   [id number] Comprehensive

## 2019-10-09 NOTE — ED ADULT TRIAGE NOTE - SOURCE OF INFORMATION
Patient is being seen today for lump found in right breast.     No LMP recorded.  Medication Reconciliation: complete    Berkley Byrnes LPN  10/9/2019 8:26 AM     Patient

## 2020-01-01 NOTE — CONSULT NOTE ADULT - PROBLEM SELECTOR RECOMMENDATION 3
Patient was on Morphine and oxycodone IR at home. Patient requiring Multiple Morphine 4mg IV PRNs. Would initiate Morphine Extended Release 15mg q12h with Morphine 4mg q3h PRN. Principal Discharge DX:	Hyperbilirubinemia,

## 2021-06-14 NOTE — ED ADULT NURSE NOTE - CARDIO ASSESSMENT
[id number]  Ophthalmolgy  [address], [address]  [place] [address]  Phone: [phone]  Fax:   Follow Up Time: 1-3 Days     WDL

## 2022-01-12 NOTE — DIETITIAN INITIAL EVALUATION ADULT. - FACTORS AFF FOOD INTAKE
Does not look like pneumonia.  Plan unchanged at this point.  persistent constipation/persistent lack of appetite/malignancy, RT

## 2022-02-02 NOTE — PATIENT PROFILE ADULT. - NS TRANSFER PATIENT BELONGINGS
See [employer], awaiting response.       SHERRELL Yu CNP  Questions or concerns please feel free to send me a [employer] message or call me  Phone : [phone]       Clothing/Cell Phone/PDA (specify)/jacket,sneakers

## 2022-03-31 NOTE — PROGRESS NOTE ADULT - PROBLEM SELECTOR PLAN 1
3 5 21 EYLEA AND REPEAT IN 9 THEN 10 WKS - MILD EDEMA/LH AT 8 WKS - NO PROGRESSION - TO TX AND EXTEND. PT/OT  Discharge planning

## 2022-07-04 NOTE — PATIENT PROFILE ADULT. - NS PRO PT RIGHT SUPPORT PERSON
Patient is a 77y old  Male who presents with a chief complaint of Substernal chest pain since last night (04 Jul 2022 15:52)      INTERVAL /OVERNIGHT EVENTS: still intubated    MEDICATIONS  (STANDING):  aspirin  chewable 81 milliGRAM(s) Oral daily  buMETAnide Injectable 2 milliGRAM(s) IV Push daily  cefTRIAXone   IVPB      cefTRIAXone   IVPB 1000 milliGRAM(s) IV Intermittent every 24 hours  chlorhexidine 0.12% Liquid 15 milliLiter(s) Oral Mucosa every 12 hours  chlorhexidine 2% Cloths 1 Application(s) Topical <User Schedule>  dexMEDEtomidine Infusion 0.2 MICROgram(s)/kG/Hr (4.15 mL/Hr) IV Continuous <Continuous>  dextrose 5%. 1000 milliLiter(s) (100 mL/Hr) IV Continuous <Continuous>  dextrose 5%. 1000 milliLiter(s) (50 mL/Hr) IV Continuous <Continuous>  dextrose 50% Injectable 25 Gram(s) IV Push once  dextrose 50% Injectable 12.5 Gram(s) IV Push once  dextrose 50% Injectable 25 Gram(s) IV Push once  DOBUTamine Infusion 5 MICROgram(s)/kG/Min (12.5 mL/Hr) IV Continuous <Continuous>  glucagon  Injectable 1 milliGRAM(s) IntraMuscular once  heparin   Injectable 5000 Unit(s) SubCutaneous every 12 hours  insulin glargine Injectable (LANTUS) 20 Unit(s) SubCutaneous two times a day  insulin lispro (ADMELOG) corrective regimen sliding scale   SubCutaneous every 6 hours  norepinephrine Infusion 0.05 MICROgram(s)/kG/Min (7.78 mL/Hr) IV Continuous <Continuous>  pantoprazole  Injectable 40 milliGRAM(s) IV Push daily  propofol Infusion 10 MICROgram(s)/kG/Min (4.98 mL/Hr) IV Continuous <Continuous>  ticagrelor 90 milliGRAM(s) Oral every 12 hours    MEDICATIONS  (PRN):  dextrose Oral Gel 15 Gram(s) Oral once PRN Blood Glucose LESS THAN 70 milliGRAM(s)/deciliter      Allergies    No Known Allergies    Intolerances        REVIEW OF SYSTEMS:  unable to obtain    Vital Signs Last 24 Hrs  T(C): 38.9 (04 Jul 2022 16:13), Max: 39.3 (03 Jul 2022 20:30)  T(F): 102.1 (04 Jul 2022 16:13), Max: 102.8 (03 Jul 2022 20:30)  HR: 93 (04 Jul 2022 15:30) (73 - 93)  BP: 100/56 (04 Jul 2022 15:30) (92/55 - 123/58)  BP(mean): 72 (04 Jul 2022 15:30) (67 - 87)  RR: 33 (04 Jul 2022 15:30) (0 - 34)  SpO2: 96% (04 Jul 2022 15:30) (94% - 98%)    PHYSICAL EXAM:  GENERAL: NAD, well-groomed, well-developed  HEAD:  Atraumatic, Normocephalic  EYES: EOMI, PERRLA, conjunctiva and sclera clear  ENMT: No tonsillar erythema, exudates, or enlargement; Moist mucous membranes, Good dentition, No lesions  NECK: Supple, No JVD, Normal thyroid  NERVOUS SYSTEM:  sedated  CHEST/LUNG: Clear to auscultation bilaterally; No rales, rhonchi, wheezing, or rubs  HEART: Regular rate and rhythm; No murmurs, rubs, or gallops  ABDOMEN: Soft, Nontender, Nondistended; Bowel sounds present  EXTREMITIES:  2+ Peripheral Pulses, No clubbing, cyanosis, or edema  LYMPH: No lymphadenopathy noted  SKIN: No rashes or lesions    LABS:                        12.4   10.72 )-----------( 225      ( 04 Jul 2022 04:55 )             40.7     04 Jul 2022 04:55    147    |  106    |  64     ----------------------------<  405    3.5     |  32     |  1.90     Ca    8.9        04 Jul 2022 04:55  Phos  2.4       04 Jul 2022 04:55  Mg     2.6       04 Jul 2022 04:55    TPro  6.9    /  Alb  2.8    /  TBili  2.8    /  DBili  x      /  AST  1741   /  ALT  >3510  /  AlkPhos  186    04 Jul 2022 04:55    PT/INR - ( 04 Jul 2022 04:55 )   PT: 18.6 sec;   INR: 1.58 ratio         PTT - ( 04 Jul 2022 04:55 )  PTT:31.3 sec    CAPILLARY BLOOD GLUCOSE      POCT Blood Glucose.: 360 mg/dL (04 Jul 2022 12:23)  POCT Blood Glucose.: 435 mg/dL (04 Jul 2022 12:18)  POCT Blood Glucose.: 382 mg/dL (04 Jul 2022 05:28)  POCT Blood Glucose.: 365 mg/dL (03 Jul 2022 23:33)  POCT Blood Glucose.: 268 mg/dL (03 Jul 2022 17:27)      RADIOLOGY & ADDITIONAL TESTS:    Notes Reviewed:  [x ] YES  [ ] NO    Care Discussed with Consultants/Other Providers [x ] YES  [ ] NO same name as above

## 2022-11-22 NOTE — DIETITIAN INITIAL EVALUATION ADULT. - 35 TO
1823 Estlander Flap (Upper To Lower Lip) Text: The defect of the lower lip was assessed and measured.  Given the location and size of the defect, an Estlander flap was deemed most appropriate.  Using a sterile surgical marker, an appropriate Estlander flap was measured and drawn on the upper lip. Local anesthesia was then infiltrated. A scalpel was then used to incise the lateral aspect of the flap, through skin, muscle and mucosa, leaving the flap pedicled medially.  The flap was then rotated and positioned to fill the lower lip defect.  The flap was then sutured into place with a three layer technique, closing the orbicularis oris muscle layer with subcutaneous buried sutures, followed by a mucosal layer and an epidermal layer.

## 2023-04-26 NOTE — PATIENT PROFILE ADULT. - PHONE #
[phone]
on the discharge service for the patient. I have reviewed and made amendments to the documentation where necessary.

## 2023-05-15 NOTE — PATIENT PROFILE ADULT. - ANESTHESIA, PREVIOUS REACTION, PROFILE
Reported blood pressure 179/120 to Dr Mercado.  Patient reports nausea and abdominal and back pain 10/10 on pain scale.  Medicated per MD order.    none

## 2023-06-14 NOTE — CONSULT NOTE ADULT - SUBJECTIVE AND OBJECTIVE BOX
HPI:  57 year old woman with breast cancer stage IV s/p craniotomy x 5, chemo, currently getting Radiation Therapy who presented to the ED with headaches. Her headache is located at the site of the mass on the right parietotemporal region. Denies associated blurry vision, nausea, vomiting. Although reports having nausea after her fourth (out of ten) radiation treatment today. Patient was recently in the ED two weeks ago for pain management for headache. MRI Head at that time showed increased parenchymal metastatic disease. Patient takes Oxycontin BID for pain control (was previously on oxycodone which did not control the pain and resulted in dizziness and weakness). Patient has not have a bowel movement in 10 days. She feels occasional abdominal cramping. Is passing gas. Patient has decreased appetite, but reports it is coming back today.     PERTINENT PMH REVIEWED:  [ ] YES [ ] NO           SOCIAL HISTORY:  Significant other/partner:  [ ] YES  [ ] NO            Children:  [ ] YES  [ ] NO                   Holiness/Spirituality:  Substance hx:  [ ] YES   [ ] NO           Tobacco hx:  [ ] YES  [ ] NO             Alcohol hx: [ ] YES  [ ] NO        Home Opioid hx:  [ ] YES  [ ] NO   Living Situation: [ ] Home  [ ] Long term care  [ ] Rehab    FAMILY HISTORY:  No pertinent family history in first degree relatives    [ ] Family history non contributory     BASELINE ADLs (prior to admission):  Independent [ ] moderately [ ] fully   Dependent   [ ] moderately [ ] fully    Code Status:                      MOLST  [ ] YES [ ] NO    Living Will  [ ] YES [ ] NO    Health Care Proxy [ ] YES  [ ] NO      [x ] Surrogate  [] HCP  [ ] Guardian:  Laura Simmons           Phone#: [phone]    Allergies    No Known Allergies    Intolerances        MEDICATIONS  (STANDING):  dexamethasone     Tablet 4 milliGRAM(s) Oral every 8 hours  docusate sodium 100 milliGRAM(s) Oral three times a day  pantoprazole    Tablet 40 milliGRAM(s) Oral before breakfast  polyethylene glycol 3350 17 Gram(s) Oral daily  senna 2 Tablet(s) Oral at bedtime  sodium chloride 0.9%. 1000 milliLiter(s) (100 mL/Hr) IV Continuous <Continuous>    MEDICATIONS  (PRN):  bisacodyl Suppository 10 milliGRAM(s) Rectal daily PRN Constipation  morphine  - Injectable 4 milliGRAM(s) IV Push every 4 hours PRN Severe Pain (7 - 10)  morphine  - Injectable 2 milliGRAM(s) IV Push every 4 hours PRN Moderate Pain (4 - 6)      PRESENT SYMPTOMS:  Source: [ ] Patient   [ ] Family   [ ] Team     Pain: [ ] YES [ ] NO  Onset:                    Location:                          Duration:                 Character:            Aggravating factors:                        Relieving factors:    Radiation:              Timing:                             Severity:      Dyspnea: [ ] YES [ ] NO - Mild [ ]  Moderate [ ]  Severe [ ]    Anxiety: [ ] YES [ ] NO  Fatigue: [ ] YES [ ] NO   Nausea: [ ] YES [ ] NO  Loss of appetite: [ ] YES [ ] NO   Constipation: [ ] YES [ ] NO     Other Symptoms:  [ ] All other review of systems negative   [ ] Unable to obtain due to poor mentation     Does patient meet criteria for Severe Protein Calorie Malnutrition?  Yes [ ]  No [ ]  PPSV 30% or below [ ]  Anasarca [ ]  Albumin < 2 [ ] Catabolic State [ ] Poor nutritional intake [ ] Significant weight loss [ ]      Palliative Performance Status Version 2:         %  ECOG -        Vital Signs Last 24 Hrs  T(C): 36.9 (31 Jan 2018 04:36), Max: 36.9 (31 Jan 2018 04:36)  T(F): 98.4 (31 Jan 2018 04:36), Max: 98.4 (31 Jan 2018 04:36)  HR: 89 (31 Jan 2018 04:36) (67 - 89)  BP: 143/90 (31 Jan 2018 04:36) (109/69 - 143/90)  BP(mean): --  RR: 17 (31 Jan 2018 04:36) (17 - 18)  SpO2: 98% (31 Jan 2018 04:36) (98% - 100%)    Physical Exam:    General: [ ] Alert,  A&O x     [ ] lethargic   [ ] Agitated   [ ] Cachexia   HEENT: [ ] Normal   [ ] Dry mouth   [ ] ET Tube    [ ] Trach   Lungs: [ ] Clear [ ] Rhonchi  [ ] Crackles [ ] Wheezing [ ] Tachypnea  [ ] Audible excessive secretions    Cardiovascular:  [ ] Regular rate and rhythm  [ ] Irregular [ ] Tachycardia   [ ] Bradycardia   Abdomen: [ ] Soft  [ ] Distended  [ ] +BS  [ ] Non tender [ ] Tender  [ ]PEG   [ ] NGT   Last BM:     Genitourinary: [ ] Normal [ ] Incontinent   [ ] Oliguria/Anuria   [ ] Cortes  Musculoskeletal:  [ ] Normal   [ ] Generalized weakness  [ ] Bedbound  [ ] Edema   Neurological: [ ] No focal deficits  [ ] Cognitive impairment     Skin: [ ] Normal   [ ] Pressure ulcers     LABS:                        9.7    9.69  )-----------( 272      ( 31 Jan 2018 04:28 )             32.0     01-31    133<L>  |  96<L>  |  7   ----------------------------<  121<H>  4.3   |  24  |  0.24<L>    Ca    8.2<L>      31 Jan 2018 04:28  Phos  1.3     01-31  Mg     2.1     01-31    TPro  7.2  /  Alb  3.5  /  TBili  0.8  /  DBili  x   /  AST  19  /  ALT  23  /  AlkPhos  100  01-29        I&O's Summary    30 Jan 2018 07:01  -  31 Jan 2018 07:00  --------------------------------------------------------  IN: 0 mL / OUT: 300 mL / NET: -300 mL        RADIOLOGY & ADDITIONAL STUDIES:    Referrals:  Hospice [ ]   Chaplaincy [ ]    Child Life [ ]   Social Work [ ]   Case Management [ ]   Holistic Therapy [ ] HPI:  57 year old woman with breast cancer stage IV s/p craniotomy x 5, chemo, currently getting Radiation Therapy who presented to the ED with headaches. Her headache is located at the site of the mass on the right parietotemporal region. Denies associated blurry vision, nausea, vomiting. Although reports having nausea after her fourth (out of ten) radiation treatment today. Patient was recently in the ED two weeks ago for pain management for headache. MRI Head at that time showed increased parenchymal metastatic disease. Patient takes Oxycontin BID for pain control (was previously on oxycodone which did not control the pain and resulted in dizziness and weakness). Patient examined while resting in bed, no acute distress. She does complain of pain at the site of her mass. Which get alleviated with Morphine IV. She does state that when the pain gets worse it does radiate through her face.     PERTINENT PMH REVIEWED:  [ ] YES [ ] NO           SOCIAL HISTORY:  Significant other/partner:  [x ] YES  [ ] NO ex- Scot Deluca           Children:  [x] YES  [ ] NO  daughter Laura Simmons                 Amish/Spirituality: Presbyterian  Substance hx:  [ ] YES   [x ] NO           Tobacco hx:  [ ] YES  [ x] NO             Alcohol hx: [ ] YES  [ x] NO        Home Opioid hx:  [x ] YES  [] NO   Living Situation: [x ] Home  [ ] Long term care  [ ] Rehab    FAMILY HISTORY:  No pertinent family history in first degree relatives    [ ] Family history non contributory     BASELINE ADLs (prior to admission):  Independent [x ] moderately [ ] fully   Dependent   [ ] moderately [ ] fully    Code Status:                      MOLST  [ ] YES [x ] NO    Living Will  [ ] YES [ x] NO    Health Care Proxy [ ] YES  [ x] NO      [x ] Surrogate  [] HCP  [ ] Guardian:  Laura Simmons           Phone#: [phone]    Allergies    No Known Allergies    Intolerances        MEDICATIONS  (STANDING):  dexamethasone     Tablet 4 milliGRAM(s) Oral every 8 hours  docusate sodium 100 milliGRAM(s) Oral three times a day  pantoprazole    Tablet 40 milliGRAM(s) Oral before breakfast  polyethylene glycol 3350 17 Gram(s) Oral daily  senna 2 Tablet(s) Oral at bedtime  sodium chloride 0.9%. 1000 milliLiter(s) (100 mL/Hr) IV Continuous <Continuous>    MEDICATIONS  (PRN):  bisacodyl Suppository 10 milliGRAM(s) Rectal daily PRN Constipation  morphine  - Injectable 4 milliGRAM(s) IV Push every 4 hours PRN Severe Pain (7 - 10)  morphine  - Injectable 2 milliGRAM(s) IV Push every 4 hours PRN Moderate Pain (4 - 6)      PRESENT SYMPTOMS:  Source: [ x] Patient   [ ] Family   [ ] Team     Pain: [x ] YES [ ] NO  Onset:   worse over several months                Location:   right parietotemporal region.                       Duration:        constant         Character:    aching        Aggravating factors:  none                       Relieving factors: opioids (iv morphine at this  time)    Radiation: yes, through her head              Timing:                             Severity:  9/10    Dyspnea: [ ] YES [ x] NO - Mild [ ]  Moderate [ ]  Severe [ ]    Anxiety: [ ] YES [x ] NO  Fatigue: [x ] YES [ ] NO   Nausea: [ ] YES [ x] NO  Loss of appetite: [ ] YES [ x] NO   Constipation: [ ] YES [ x] NO     Other Symptoms:  [ ] All other review of systems negative   [ ] Unable to obtain due to poor mentation     Does patient meet criteria for Severe Protein Calorie Malnutrition?  Yes [ ]  No [ ]  PPSV 30% or below [ ]  Anasarca [ ]  Albumin < 2 [ ] Catabolic State [ ] Poor nutritional intake [ ] Significant weight loss [ ]      Palliative Performance Status Version 2:        60 %      Vital Signs Last 24 Hrs  T(C): 36.9 (31 Jan 2018 04:36), Max: 36.9 (31 Jan 2018 04:36)  T(F): 98.4 (31 Jan 2018 04:36), Max: 98.4 (31 Jan 2018 04:36)  HR: 89 (31 Jan 2018 04:36) (67 - 89)  BP: 143/90 (31 Jan 2018 04:36) (109/69 - 143/90)  BP(mean): --  RR: 17 (31 Jan 2018 04:36) (17 - 18)  SpO2: 98% (31 Jan 2018 04:36) (98% - 100%)    Physical Exam:    General: [x ] Alert,  A&O x3     [ ] lethargic   [ ] Agitated   [ ] Cachexia   HEENT: [ ] Normal   [ ] Dry mouth   [ ] ET Tube    [ ] Trach [x] mass right parietotemporal region.  Lungs: [ x] Clear [ ] Rhonchi  [ ] Crackles [ ] Wheezing [ ] Tachypnea  [ ] Audible excessive secretions    Cardiovascular:  [x ] Regular rate and rhythm  [ ] Irregular [ ] Tachycardia   [ ] Bradycardia   Abdomen: [x ] Soft  [ ] Distended  [ ] +BS  [x ] Non tender [ ] Tender  [ ]PEG   [ ] NGT   Last BM:     Genitourinary: [x ] Normal [ ] Incontinent   [ ] Oliguria/Anuria   [ ] Cortes  Musculoskeletal:  [ ] Normal   [x ] Generalized weakness  [ ] Bedbound  [ ] Edema   Neurological: [x] No focal deficits  [ ] Cognitive impairment     Skin: [ x] Normal   [ ] Pressure ulcers     LABS:                        9.7    9.69  )-----------( 272      ( 31 Jan 2018 04:28 )             32.0     01-31    133<L>  |  96<L>  |  7   ----------------------------<  121<H>  4.3   |  24  |  0.24<L>    Ca    8.2<L>      31 Jan 2018 04:28  Phos  1.3     01-31  Mg     2.1     01-31    TPro  7.2  /  Alb  3.5  /  TBili  0.8  /  DBili  x   /  AST  19  /  ALT  23  /  AlkPhos  100  01-29        I&O's Summary    30 Jan 2018 07:01  -  31 Jan 2018 07:00  --------------------------------------------------------  IN: 0 mL / OUT: 300 mL / NET: -300 mL        RADIOLOGY & ADDITIONAL STUDIES:    EXAM:  XR CHEST PA LAT 2V      PROCEDURE DATE:  Jan 29 2018     IMPRESSION: Right hilar mass corresponding to known right upper lobe mass   noted on CT dated 1/11/2018.      Referrals:  Hospice [ ]   Chaplaincy [ ]    Child Life [ ]   Social Work [ ]   Case Management [ ]   Holistic Therapy [ ] Anesthesia Type: 1% lidocaine with epinephrine HPI:  57 year old woman with breast cancer stage IV s/p craniotomy x 5, chemo, currently getting Radiation Therapy who presented to the ED with headaches. Her headache is located at the site of the mass on the right parietotemporal region. Denies associated blurry vision, nausea, vomiting. Although reports having nausea after her fourth (out of ten) radiation treatment today. Patient was recently in the ED two weeks ago for pain management for headache. MRI Head at that time showed increased parenchymal metastatic disease. Patient takes Oxycontin BID for pain control (was previously on oxycodone which did not control the pain and resulted in dizziness and weakness). Patient examined while resting in bed, no acute distress. She does complain of pain at the site of her mass. Which get alleviated with Morphine IV. She does state that when the pain gets worse it does radiate through her face.     PERTINENT PMH REVIEWED:  [x ] YES [ ] NO           SOCIAL HISTORY:  Significant other/partner:  [x ] YES  [ ] NO ex- Scot Deluca           Children:  [x] YES  [ ] NO  daughter Laura Simmons                 Congregation/Spirituality: Presbyterian  Substance hx:  [ ] YES   [x ] NO           Tobacco hx:  [ ] YES  [ x] NO             Alcohol hx: [ ] YES  [ x] NO        Home Opioid hx:  [x ] YES  [] NO   Living Situation: [x ] Home  [ ] Long term care  [ ] Rehab    FAMILY HISTORY:  No pertinent family history in first degree relatives    [x ] Family history non contributory     BASELINE ADLs (prior to admission):  Independent [x ] moderately [ ] fully   Dependent   [ ] moderately [ ] fully    Code Status:                      MOLST  [ ] YES [x ] NO    Living Will  [ ] YES [ x] NO    Health Care Proxy [ ] YES  [ x] NO      [x ] Surrogate  [] HCP  [ ] Guardian:  Laura Simmons           Phone#: [phone]    Allergies    No Known Allergies    Intolerances        MEDICATIONS  (STANDING):  dexamethasone     Tablet 4 milliGRAM(s) Oral every 8 hours  docusate sodium 100 milliGRAM(s) Oral three times a day  pantoprazole    Tablet 40 milliGRAM(s) Oral before breakfast  polyethylene glycol 3350 17 Gram(s) Oral daily  senna 2 Tablet(s) Oral at bedtime  sodium chloride 0.9%. 1000 milliLiter(s) (100 mL/Hr) IV Continuous <Continuous>    MEDICATIONS  (PRN):  bisacodyl Suppository 10 milliGRAM(s) Rectal daily PRN Constipation  morphine  - Injectable 4 milliGRAM(s) IV Push every 4 hours PRN Severe Pain (7 - 10)  morphine  - Injectable 2 milliGRAM(s) IV Push every 4 hours PRN Moderate Pain (4 - 6)      PRESENT SYMPTOMS:  Source: [ x] Patient   [ ] Family   [ ] Team     Pain: [x ] YES [ ] NO  Onset:   worse over several months                Location:   right parietotemporal region.                       Duration:        constant         Character:    aching        Aggravating factors:  none                       Relieving factors: opioids (iv morphine at this  time)    Radiation: yes, through her head              Timing:                             Severity:  9/10    Dyspnea: [ ] YES [ x] NO - Mild [ ]  Moderate [ ]  Severe [ ]    Anxiety: [ ] YES [x ] NO  Fatigue: [x ] YES [ ] NO   Nausea: [ ] YES [ x] NO  Loss of appetite: [ ] YES [ x] NO   Constipation: [ ] YES [ x] NO     Other Symptoms:  [x ] All other review of systems negative   [ ] Unable to obtain due to poor mentation     Does patient meet criteria for Severe Protein Calorie Malnutrition?  Yes [ ]  No [ x]  PPSV 30% or below [ ]  Anasarca [ ]  Albumin < 2 [ ] Catabolic State [ ] Poor nutritional intake [ ] Significant weight loss [ ]      Palliative Performance Status Version 2:        60 %      Vital Signs Last 24 Hrs  T(C): 36.9 (31 Jan 2018 04:36), Max: 36.9 (31 Jan 2018 04:36)  T(F): 98.4 (31 Jan 2018 04:36), Max: 98.4 (31 Jan 2018 04:36)  HR: 89 (31 Jan 2018 04:36) (67 - 89)  BP: 143/90 (31 Jan 2018 04:36) (109/69 - 143/90)  BP(mean): --  RR: 17 (31 Jan 2018 04:36) (17 - 18)  SpO2: 98% (31 Jan 2018 04:36) (98% - 100%)    Physical Exam:    General: [x ] Alert,  A&O x3     [ ] lethargic   [ ] Agitated   [ ] Cachexia   HEENT: [ ] Normal   [ ] Dry mouth   [ ] ET Tube    [ ] Trach [x] mass right parietotemporal region.  Lungs: [ x] Clear [ ] Rhonchi  [ ] Crackles [ ] Wheezing [ ] Tachypnea  [ ] Audible excessive secretions    Cardiovascular:  [x ] Regular rate and rhythm  [ ] Irregular [ ] Tachycardia   [ ] Bradycardia   Abdomen: [x ] Soft  [ ] Distended  [ ] +BS  [x ] Non tender [ ] Tender  [ ]PEG   [ ] NGT   Last BM:     Genitourinary: [x ] Normal [ ] Incontinent   [ ] Oliguria/Anuria   [ ] Cortes  Musculoskeletal:  [ ] Normal   [x ] Generalized weakness  [ ] Bedbound  [ ] Edema   Neurological: [x] No focal deficits  [ ] Cognitive impairment     Skin: [ x] Normal   [ ] Pressure ulcers     LABS:                        9.7    9.69  )-----------( 272      ( 31 Jan 2018 04:28 )             32.0     01-31    133<L>  |  96<L>  |  7   ----------------------------<  121<H>  4.3   |  24  |  0.24<L>    Ca    8.2<L>      31 Jan 2018 04:28  Phos  1.3     01-31  Mg     2.1     01-31    TPro  7.2  /  Alb  3.5  /  TBili  0.8  /  DBili  x   /  AST  19  /  ALT  23  /  AlkPhos  100  01-29        I&O's Summary    30 Jan 2018 07:01  -  31 Jan 2018 07:00  --------------------------------------------------------  IN: 0 mL / OUT: 300 mL / NET: -300 mL        RADIOLOGY & ADDITIONAL STUDIES:    EXAM:  XR CHEST PA LAT 2V      PROCEDURE DATE:  Jan 29 2018     IMPRESSION: Right hilar mass corresponding to known right upper lobe mass   noted on CT dated 1/11/2018.      Referrals:  Hospice [ ]   Chaplaincy [ ]    Child Life [ ]   Social Work [ ]   Case Management [ ]   Holistic Therapy [ ]

## 2023-07-07 NOTE — PROGRESS NOTE ADULT - PROBLEM SELECTOR PLAN 4
Discharge Instructions  Vomiting    You have been seen today for vomiting (throwing up). This is usually caused by a virus, but some bacteria, parasites, medicines or other medical conditions can cause similar symptoms. At this time your provider does not find that your vomiting is a sign of anything dangerous or life-threatening. However, sometimes the signs of serious illness do not show up right away. If you have new or worse symptoms, you may need to be seen again in the Emergency Department or by your primary provider. Remember that serious problems like appendicitis can start as vomiting.    Generally, every Emergency Department visit should have a follow-up clinic visit with either a primary or a specialty clinic/provider. Please follow-up as instructed by your emergency provider today.    Return to the Emergency Department if:  You keep vomiting and you are not able to keep liquids down.   You feel you are getting dehydrated, such as being very thirsty, not urinating (peeing) at least every 8-12 hours, or feeling faint or lightheaded.   You develop a new fever, or your fever continues for more than 2 days.   You have abdominal (belly pain) that seems worse than cramps, is in one spot, or is getting worse over time. Appendicitis usually causes pain in the right lower abdomen (to the right and below your belly button) so watch for pain in this location.  You have blood in your vomit or stools.   You feel very weak.  You are not starting to improve within 24 hours of your visit here.     What can I do to help myself?  The most important thing to do is to drink clear liquids. If you have been vomiting a lot, it is best to have only small, frequent sips of liquids. Drinking too much at once may cause more vomiting. If you are vomiting often, you must replace minerals, sodium and potassium lost with your illness. Pedialyte  is the best available rehydration liquid but some find that it doesn t taste good so sports  drinks are an alterative. You can also drink clear liquids such as water, weak tea, apple juice, and 7-Up . Avoid acid liquids (orange), caffeine (coffee) or alcohol. Do not drink milk until you no longer have diarrhea (loose stools).   After liquids are staying down, you may start eating mild foods. Soda crackers, toast, plain noodles, gelatin, applesauce and bananas are good first choices. Avoid foods that have acid, are spicy, fatty or have a lot of fiber (such as meats, coarse grains, vegetables). You may start eating these foods again in about 3 days when you are better.   Sometimes treatment includes prescription medicine to prevent nausea (sick to your stomach) and vomiting. If your provider prescribes these for you, take them as directed.   Do not take ibuprofen, naproxen, or other nonsteroidal anti-inflammatory (NSAID) medicines without checking with your healthcare provider.     If you were given a prescription for medicine here today, be sure to read all of the information (including the package insert) that comes with your prescription.  This will include important information about the medicine, its side effects, and any warnings that you need to know about.  The pharmacist who fills the prescription can provide more information and answer questions you may have about the medicine.  If you have questions or concerns that the pharmacist cannot address, please call or return to the Emergency Department.     Remember that you can always come back to the Emergency Department if you are not able to see your regular provider in the amount of time listed above, if you get any new symptoms, or if there is anything that worries you.     DVT ppx: SCIDs  Diet: Regular, vegan   Dispo: PT consult

## 2023-08-07 NOTE — ED PROVIDER NOTE - NS ED MD DISPO ISOLATION TYPES
Patient returned call and was notified of results. Mailed copy to his home. Asking if you're going to send over statin medication or additional medications for him? Patient refusing right now anything to do with insulin. None

## 2024-04-08 NOTE — PATIENT PROFILE ADULT. - ABILITY TO HEAR (WITH HEARING AID OR HEARING APPLIANCE IF NORMALLY USED):
Returned patient's call, advised the patient to keep the appointment in order to get evaluated and further treatment. Patient can schedule the dentist appointment in case needs dental work. Also let the patient knows that can come in to see urgent care for acute care if patient can't wait until the appointment on April 11th.   Patient verbalized understanding and compliance.    Adequate: hears normal conversation without difficulty

## 2025-01-24 NOTE — H&P ADULT - PROBLEM SELECTOR PLAN 2
[facility] [place] [phone]  [facility] [place] [phone]  [facility] [place] [phone]  [facility] [place], [phone]       You were seen for Depression/Anxiety and you were started on a medication for your reported symptoms.   To ensure you receive the needed follow up you will need to schedule an appointment as soon as possible for follow up.     Creating a better Video Visit Experience:  You will need the following equipment home so you can be seen at home for more.   Please obtain a thermometer, blood pressure cuff, pulse oximeter, and scale. You can also find testing supplies at your local pharmacy or online for complaints like COVID, Flu, Strep, urinary tract or vaginal discharge. Testing prior to your visit may save you time so you do not have to be seen in-person for testing.     Depression is a condition where an individual has regular feelings of sadness, hopelessness and loss of interest in normal activities.   Anxiety is a condition where an individual has frequent intense, excessive and persistent worry and fear about everyday situations. It is common to have both depression and anxiety.     Depression Symptoms:   Irritability or frustration  Trouble sleeping such as not being able to sleep or sleeping too much   Lack of energy  Decreased appetite or increased cravings for food   Anxiety, agitation or restlessness  Moving or speaking slowly or slowed thinking  Feelings of worthlessness or guilt, fixating on past failures or self-blame  Trouble concentrating, making decisions and remembering things  Thoughts of death, suicidal thoughts, suicide attempts or suicide  Unexplained physical problems, such as back pain or headaches    Anxiety Symptoms:     Nervous or restless   Feeling of impending danger  Feeling that heart if beating fast   Breathing rapidly   Sweating  Feeling weak or tired  Trouble  concentrating   Trouble sleeping  Stomach problems - upset stomach   Difficulty controlling worry  Avoid activities  that trigger anxiety    Starting a new medication:  There may be increased risk of suicidal behavior in adults treated with medications used for anxiety and depression. This is not common, but if you have these thoughts or feelings, please call your doctor and seek immediate care right away. Fatigue is one of the symptoms of depression and the initial benefit of some medications is that they increase energy. However, improvement of depression symptoms can take a few weeks, but in the meantime, some patients may use their extra energy to act on their suicidal thoughts. Contact a health care provider immediately if having thoughts of suicide.     Suicide Warning signs:  People who are thinking about suicide may not know they are depressed. Certain thoughts, feelings, and actions can be signals that let you know a person may need help. Watch for these warning signs of suicide.   Threats or talk of suicide   Buying a gun or other weapon or hoarding medicines   Statements such as “I won't be a problem much longer” or “Nothing matters”   Giving away items they own, making out a will, or planning their    Suddenly being happy or calm after being depressed   Expressing feelings of being a burden to others   Increased use of alcohol, drugs, or other risky behaviors   Withdrawing from people and activities   Expressing feelings of hopelessness or being trapped   Sleeping too much or too little   Feeling there is no reason to live   Calling people to say goodbye   Experiencing chronic, unbearable pain   Not participating in the medication plan of care or abruptly stopping medication regimen    Unexplained/uncharacteristic erratic behaviors   Recent anniversary of death or recent death of a loved one.     Medication Side effects:  Most side effects will resolve on their own in about 2 weeks. Do not stop  medication without talking to your healthcare provider. If side effects last more than 2 weeks contact your primary care provider.     Marijuana:   A lot of people with depression see marijuana as a safe drug. Using marijuana can affect how well people do in life. Research shows that people who use marijuana are more likely to have relationship problems, worse educational outcomes, lower career achievement, and reduced life satisfaction. When starting use before age 18, the rate of addiction rises to 1 in 6. If you you need help quitting call [facility]’s National Helpline listed below.     Negative Effects and Risks of Marijuana use:   Addiction: Approximately 1 in 10 people who use marijuana will become addicted.   Worsening mental health: Studies link marijuana use to depression, anxiety, suicide planning, and psychotic episodes. Including increased risk for schizophrenia with each exposure.     Decreased Intellectual Ability: IQ reduction by an average of 10 points  Increased Cardiovascular disease risk: 4 times greater for heart disease with use of marijuana as compared to cigarettes.        Getting Help:  If you think you might harm yourself or another person call 911. If you are having thoughts of suicide get help right away.     Contact a Suicide Help Line:     National Suicide Prevention Lifeline: Call or text 988  If you are thinking about suicide, are worried about a friend or loved one, or would like emotional support, the lifeline is available 24/7.      Crisis Text Line: Text 043354   This text line is free, 24/7 support for anyone in crisis. You will be connected by text with a trained Crisis Counselor.      [employer] Lifeline:1-013-050-3673 or [employer] by texting “START” to 927777   [employer] is an organization proving crisis intervention and suicide prevention services to lesbian, robles, bisexual, transgender, queer, and questioning (LGBTQ) young people under 25.      [facility]’s National Helpline:  9-496-032-HELP (4357) (also known as the Treatment Referral Routing Service), or TTY: 1-967.852.5490 is a confidential, free, 24-hour-a-day, 365-day-a-year, information service, in English and [place]ic, for individuals and family members facing mental and/or substance use disorders. This service provides referrals to local treatment facilities, support groups, and community-based organizations.    Thank you for connecting with us today. If you have any questions after your visit, please feel free to contact us at [phone].      What to do in an Emergency:  If you are experiencing a medical emergency, call 911 immediately. Symptoms that require immediate attention require a visit at Urgent Care ([place]), Immediate Care Center ([place]) or the Emergency Room of a nearby hospital.    When to contact a provider:  Seek in-person treatment if there is no improvement of symptoms.     Billing Questions:   If you have any billing concerns, please contact our Billing Department at [phone]. Hours: Mon. - Thu. 7:30 am - 6 pm and Friday 7:30 pm to 5 pm.    Thank you for entrusting us with your care.     You can connect by Video with a Virtual Primary Care provider 24/7 for common and non-urgent symptoms, call to schedule an appointment [phone].     No signs of obstruction   - Senna, MiraLax, colace No signs of obstruction   - Senna, MiraLax, colace  - Will give lactulose 10

## 2025-06-21 NOTE — DISCHARGE NOTE ADULT - DO YOU HAVE DIFFICULTY CLIMBING STAIRS
[Alert] : alert [Well Nourished] : well nourished [No Acute Distress] : no acute distress [Well Developed] : well developed [Normal Sclera/Conjunctiva] : normal sclera/conjunctiva [EOMI] : extra ocular movement intact [No Proptosis] : no proptosis [Normal Oropharynx] : the oropharynx was normal [Thyroid Not Enlarged] : the thyroid was not enlarged [No Thyroid Nodules] : no palpable thyroid nodules [No Respiratory Distress] : no respiratory distress [No Accessory Muscle Use] : no accessory muscle use [Clear to Auscultation] : lungs were clear to auscultation bilaterally [Normal S1, S2] : normal S1 and S2 [Normal Rate] : heart rate was normal [Regular Rhythm] : with a regular rhythm [No Edema] : no peripheral edema [Pedal Pulses Normal] : the pedal pulses are present [Normal Bowel Sounds] : normal bowel sounds [Not Tender] : non-tender [Not Distended] : not distended [Soft] : abdomen soft [Normal Anterior Cervical Nodes] : no anterior cervical lymphadenopathy [No Spinal Tenderness] : no spinal tenderness [Spine Straight] : spine straight [No Stigmata of Cushings Syndrome] : no stigmata of Cushings Syndrome [Normal Gait] : normal gait [Normal Strength/Tone] : muscle strength and tone were normal [No Rash] : no rash [Normal Reflexes] : deep tendon reflexes were 2+ and symmetric [No Tremors] : no tremors [Oriented x3] : oriented to person, place, and time Yes [Acanthosis Nigricans] : no acanthosis nigricans